# Patient Record
Sex: MALE | Race: BLACK OR AFRICAN AMERICAN | NOT HISPANIC OR LATINO | URBAN - METROPOLITAN AREA
[De-identification: names, ages, dates, MRNs, and addresses within clinical notes are randomized per-mention and may not be internally consistent; named-entity substitution may affect disease eponyms.]

---

## 2021-06-03 ENCOUNTER — INPATIENT (INPATIENT)
Facility: HOSPITAL | Age: 56
LOS: 4 days | Discharge: NURSING, REHAB OR LONG-TERM CARE | End: 2021-06-08
Attending: INTERNAL MEDICINE | Admitting: INTERNAL MEDICINE
Payer: COMMERCIAL

## 2021-06-03 VITALS
DIASTOLIC BLOOD PRESSURE: 105 MMHG | HEART RATE: 91 BPM | SYSTOLIC BLOOD PRESSURE: 201 MMHG | RESPIRATION RATE: 18 BRPM | TEMPERATURE: 99 F | HEIGHT: 68 IN | WEIGHT: 154.98 LBS | OXYGEN SATURATION: 97 %

## 2021-06-03 LAB
ALBUMIN SERPL ELPH-MCNC: 4.9 G/DL — SIGNIFICANT CHANGE UP (ref 3.5–5.2)
ALP SERPL-CCNC: 120 U/L — HIGH (ref 30–115)
ALT FLD-CCNC: 58 U/L — HIGH (ref 0–41)
ANION GAP SERPL CALC-SCNC: 17 MMOL/L — HIGH (ref 7–14)
ANISOCYTOSIS BLD QL: SLIGHT — SIGNIFICANT CHANGE UP
APTT BLD: 32.1 SEC — SIGNIFICANT CHANGE UP (ref 27–39.2)
AST SERPL-CCNC: 66 U/L — HIGH (ref 0–41)
BASOPHILS # BLD AUTO: 0.1 K/UL — SIGNIFICANT CHANGE UP (ref 0–0.2)
BASOPHILS NFR BLD AUTO: 1.9 % — HIGH (ref 0–1)
BILIRUB SERPL-MCNC: 0.2 MG/DL — SIGNIFICANT CHANGE UP (ref 0.2–1.2)
BUN SERPL-MCNC: 20 MG/DL — SIGNIFICANT CHANGE UP (ref 10–20)
CALCIUM SERPL-MCNC: 9.6 MG/DL — SIGNIFICANT CHANGE UP (ref 8.5–10.1)
CHLORIDE SERPL-SCNC: 97 MMOL/L — LOW (ref 98–110)
CO2 SERPL-SCNC: 20 MMOL/L — SIGNIFICANT CHANGE UP (ref 17–32)
CREAT SERPL-MCNC: 0.8 MG/DL — SIGNIFICANT CHANGE UP (ref 0.7–1.5)
EOSINOPHIL # BLD AUTO: 0 K/UL — SIGNIFICANT CHANGE UP (ref 0–0.7)
EOSINOPHIL NFR BLD AUTO: 0 % — SIGNIFICANT CHANGE UP (ref 0–8)
GIANT PLATELETS BLD QL SMEAR: PRESENT — SIGNIFICANT CHANGE UP
GLUCOSE SERPL-MCNC: 109 MG/DL — HIGH (ref 70–99)
HCT VFR BLD CALC: 36.5 % — LOW (ref 42–52)
HGB BLD-MCNC: 12.7 G/DL — LOW (ref 14–18)
INR BLD: 0.94 RATIO — SIGNIFICANT CHANGE UP (ref 0.65–1.3)
LYMPHOCYTES # BLD AUTO: 2.05 K/UL — SIGNIFICANT CHANGE UP (ref 1.2–3.4)
LYMPHOCYTES # BLD AUTO: 37.4 % — SIGNIFICANT CHANGE UP (ref 20.5–51.1)
MANUAL SMEAR VERIFICATION: SIGNIFICANT CHANGE UP
MCHC RBC-ENTMCNC: 29.6 PG — SIGNIFICANT CHANGE UP (ref 27–31)
MCHC RBC-ENTMCNC: 34.8 G/DL — SIGNIFICANT CHANGE UP (ref 32–37)
MCV RBC AUTO: 85.1 FL — SIGNIFICANT CHANGE UP (ref 80–94)
MONOCYTES # BLD AUTO: 0.2 K/UL — SIGNIFICANT CHANGE UP (ref 0.1–0.6)
MONOCYTES NFR BLD AUTO: 3.7 % — SIGNIFICANT CHANGE UP (ref 1.7–9.3)
NEUTROPHILS # BLD AUTO: 3.02 K/UL — SIGNIFICANT CHANGE UP (ref 1.4–6.5)
NEUTROPHILS NFR BLD AUTO: 55.1 % — SIGNIFICANT CHANGE UP (ref 42.2–75.2)
PLAT MORPH BLD: NORMAL — SIGNIFICANT CHANGE UP
PLATELET # BLD AUTO: 284 K/UL — SIGNIFICANT CHANGE UP (ref 130–400)
POIKILOCYTOSIS BLD QL AUTO: SLIGHT — SIGNIFICANT CHANGE UP
POLYCHROMASIA BLD QL SMEAR: SLIGHT — SIGNIFICANT CHANGE UP
POTASSIUM SERPL-MCNC: 4 MMOL/L — SIGNIFICANT CHANGE UP (ref 3.5–5)
POTASSIUM SERPL-SCNC: 4 MMOL/L — SIGNIFICANT CHANGE UP (ref 3.5–5)
PROT SERPL-MCNC: 7.6 G/DL — SIGNIFICANT CHANGE UP (ref 6–8)
PROTHROM AB SERPL-ACNC: 10.8 SEC — SIGNIFICANT CHANGE UP (ref 9.95–12.87)
RBC # BLD: 4.29 M/UL — LOW (ref 4.7–6.1)
RBC # FLD: 13.7 % — SIGNIFICANT CHANGE UP (ref 11.5–14.5)
RBC BLD AUTO: ABNORMAL
SARS-COV-2 RNA SPEC QL NAA+PROBE: SIGNIFICANT CHANGE UP
SMUDGE CELLS # BLD: PRESENT — SIGNIFICANT CHANGE UP
SODIUM SERPL-SCNC: 134 MMOL/L — LOW (ref 135–146)
TROPONIN T SERPL-MCNC: <0.01 NG/ML — SIGNIFICANT CHANGE UP
VARIANT LYMPHS # BLD: 1.9 % — SIGNIFICANT CHANGE UP (ref 0–5)
WBC # BLD: 5.48 K/UL — SIGNIFICANT CHANGE UP (ref 4.8–10.8)
WBC # FLD AUTO: 5.48 K/UL — SIGNIFICANT CHANGE UP (ref 4.8–10.8)

## 2021-06-03 PROCEDURE — 70551 MRI BRAIN STEM W/O DYE: CPT | Mod: 26

## 2021-06-03 PROCEDURE — 99283 EMERGENCY DEPT VISIT LOW MDM: CPT

## 2021-06-03 PROCEDURE — 70450 CT HEAD/BRAIN W/O DYE: CPT | Mod: 26,MA,59

## 2021-06-03 PROCEDURE — 70496 CT ANGIOGRAPHY HEAD: CPT | Mod: 26,MA

## 2021-06-03 PROCEDURE — 93010 ELECTROCARDIOGRAM REPORT: CPT

## 2021-06-03 PROCEDURE — 71045 X-RAY EXAM CHEST 1 VIEW: CPT | Mod: 26,77

## 2021-06-03 PROCEDURE — 99222 1ST HOSP IP/OBS MODERATE 55: CPT

## 2021-06-03 PROCEDURE — 99285 EMERGENCY DEPT VISIT HI MDM: CPT

## 2021-06-03 PROCEDURE — 71045 X-RAY EXAM CHEST 1 VIEW: CPT | Mod: 26

## 2021-06-03 PROCEDURE — 0042T: CPT

## 2021-06-03 PROCEDURE — 70498 CT ANGIOGRAPHY NECK: CPT | Mod: 26,MA

## 2021-06-03 RX ORDER — ACETAMINOPHEN 500 MG
1000 TABLET ORAL EVERY 6 HOURS
Refills: 0 | Status: COMPLETED | OUTPATIENT
Start: 2021-06-03 | End: 2021-06-03

## 2021-06-03 RX ORDER — ATORVASTATIN CALCIUM 80 MG/1
80 TABLET, FILM COATED ORAL AT BEDTIME
Refills: 0 | Status: DISCONTINUED | OUTPATIENT
Start: 2021-06-03 | End: 2021-06-08

## 2021-06-03 RX ORDER — ASPIRIN/CALCIUM CARB/MAGNESIUM 324 MG
81 TABLET ORAL DAILY
Refills: 0 | Status: DISCONTINUED | OUTPATIENT
Start: 2021-06-04 | End: 2021-06-08

## 2021-06-03 RX ORDER — ASPIRIN/CALCIUM CARB/MAGNESIUM 324 MG
300 TABLET ORAL ONCE
Refills: 0 | Status: COMPLETED | OUTPATIENT
Start: 2021-06-03 | End: 2021-06-03

## 2021-06-03 RX ORDER — ACETAMINOPHEN 500 MG
650 TABLET ORAL EVERY 6 HOURS
Refills: 0 | Status: DISCONTINUED | OUTPATIENT
Start: 2021-06-03 | End: 2021-06-03

## 2021-06-03 RX ORDER — ASPIRIN/CALCIUM CARB/MAGNESIUM 324 MG
325 TABLET ORAL ONCE
Refills: 0 | Status: COMPLETED | OUTPATIENT
Start: 2021-06-03 | End: 2021-06-03

## 2021-06-03 RX ORDER — SODIUM CHLORIDE 9 MG/ML
1000 INJECTION INTRAMUSCULAR; INTRAVENOUS; SUBCUTANEOUS ONCE
Refills: 0 | Status: COMPLETED | OUTPATIENT
Start: 2021-06-03 | End: 2021-06-03

## 2021-06-03 RX ORDER — ATORVASTATIN CALCIUM 80 MG/1
80 TABLET, FILM COATED ORAL ONCE
Refills: 0 | Status: COMPLETED | OUTPATIENT
Start: 2021-06-03 | End: 2021-06-03

## 2021-06-03 RX ADMIN — ATORVASTATIN CALCIUM 80 MILLIGRAM(S): 80 TABLET, FILM COATED ORAL at 22:28

## 2021-06-03 RX ADMIN — Medication 300 MILLIGRAM(S): at 08:52

## 2021-06-03 RX ADMIN — Medication 400 MILLIGRAM(S): at 22:28

## 2021-06-03 RX ADMIN — Medication 1000 MILLIGRAM(S): at 23:00

## 2021-06-03 RX ADMIN — SODIUM CHLORIDE 1000 MILLILITER(S): 9 INJECTION INTRAMUSCULAR; INTRAVENOUS; SUBCUTANEOUS at 09:46

## 2021-06-03 NOTE — H&P ADULT - ASSESSMENT
Impression:    Acute Non ischemic CVA- No tPA  HTN- new Dx  h/o HLD    PLAN    1.CNS : Monitor mental status, Avoid sedatives. Neuro check q1. Follow up Neurology. c/w ASA, lipitor.     2. CVS : Keep even balance. echo.     3. PULMONARY : HOB at 45. Oral care, Supplemental oxygen if needed.     4. INFECTIOUS DISEASE : monitor off abx    5. GI-  Feeding after Speech eval, NPO for now    6. RENAL and acid base : Monitor Cr and lytes.     7. Endocrine : Check a1c, lipid profile. Insulin protocol if needed     8. Hematology : DVT ppx         Dispo MICU         Impression:    Acute Non ischemic CVA- No tPA  HTN- new Dx  h/o HLD    PLAN    1.CNS : Monitor mental status, Avoid sedatives. Neuro check q1. Follow up Neurology. c/w ASA, lipitor.     2. CVS : Keep even balance. echo. SBP goal- 140-180    3. PULMONARY : HOB at 45. Oral care, Supplemental oxygen if needed.     4. INFECTIOUS DISEASE : monitor off abx    5. GI-  Feeding after Speech eval, NPO for now    6. RENAL and acid base : Monitor Cr and lytes.     7. Endocrine : Check a1c, lipid profile. Insulin protocol if needed     8. Hematology : DVT ppx         Dispo MICU         Impression:    Acute Non ischemic CVA- No tPA  HTN- new Dx  h/o HLD    PLAN    1.CNS : Monitor mental status, Avoid sedatives. Neuro check q1. Follow up Neurology. c/w ASA, lipitor. Follow up brain MRI    2. CVS : Keep even balance. echo. SBP goal- 140-180    3. PULMONARY : HOB at 45. Oral care, Supplemental oxygen if needed.     4. INFECTIOUS DISEASE : monitor off abx    5. GI-  Feeding after Speech eval, NPO for now    6. RENAL and acid base : Monitor Cr and lytes.     7. Endocrine : Check a1c, lipid profile. Insulin protocol if needed     8. Hematology : DVT ppx SCDs as per neuro         Dispo MICU

## 2021-06-03 NOTE — ED PROVIDER NOTE - NS ED ROS FT
Constitutional: (-) fever (-) chills  (-) lightheadedness   Eyes/ENT: (-) blurry vision, (-) epistaxis (-) rhinorrhea (-) nasal congestion  Cardiovascular: (-) chest pain, (-) syncope (-) palpitations   Respiratory: (-) cough, (-) shortness of breath (-) pleurisy   Gastrointestinal: (-) vomiting, (-) diarrhea (-) abdominal pain (-) nausea (-) anorexia  Musculoskeletal: (-) neck pain, (-) back pain, (-) joint pain (-) joint swelling (-) painful ROM  Integumentary: (-) rash, (-) edema (-) lacerations (-) pruritis   Neurological: (+) headache, (-) altered mental status (-) LOC (+) dizziness (+) paresthesias (-) gait abnormalities   Psychiatric: (-) hallucinations (-) SI (-) HI Constitutional: (-) fever (-) chills  (-) lightheadedness   Eyes/ENT: (-) blurry vision, (-) epistaxis (-) rhinorrhea (-) nasal congestion  Cardiovascular: (-) chest pain, (-) syncope (-) palpitations   Respiratory: (-) cough, (-) shortness of breath (-) pleurisy   Gastrointestinal: (-) vomiting, (-) diarrhea (-) abdominal pain (-) nausea (-) anorexia  Musculoskeletal: (-) neck pain, (-) back pain, (-) joint pain (-) joint swelling (-) painful ROM  Integumentary: (-) rash, (-) edema (-) lacerations (-) pruritis   Neurological: (+) headache, (-) altered mental status (-) LOC (+) dizziness (+) paresthesias (-) gait abnormalities

## 2021-06-03 NOTE — H&P ADULT - NSHPLABSRESULTS_GEN_ALL_CORE
12.7   5.48  )-----------( 284      ( 03 Jun 2021 08:20 )             36.5       06-03    134<L>  |  97<L>  |  20  ----------------------------<  109<H>  4.0   |  20  |  0.8    Ca    9.6      03 Jun 2021 08:20    TPro  7.6  /  Alb  4.9  /  TBili  0.2  /  DBili  x   /  AST  66<H>  /  ALT  58<H>  /  AlkPhos  120<H>  06-03                  PT/INR - ( 03 Jun 2021 08:20 )   PT: 10.80 sec;   INR: 0.94 ratio         PTT - ( 03 Jun 2021 08:20 )  PTT:32.1 sec    Lactate Trend      CARDIAC MARKERS ( 03 Jun 2021 08:20 )  x     / <0.01 ng/mL / x     / x     / x            CAPILLARY BLOOD GLUCOSE  109 (03 Jun 2021 09:03)      POCT Blood Glucose.: 109 mg/dL (03 Jun 2021 07:48)    < from: CT Brain Stroke Protocol (06.03.21 @ 07:58) >    IMPRESSION:    1.  No evidence of acute intracranial hemorrhage or large territory infarct.    2.  1 cm dural based lesion of the left parietal convexity likely reflecting a meningioma.        < from: CT Perfusion w/ Maps w/ IV Cont (06.03.21 @ 08:08) >    IMPRESSION:    1.  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 The PICA demonstrates diminished opacification. Corresponding ischemic penumbra within the right cerebellar hemisphere (Tmax >6s volume 15 cc, no abnormal CBF).    2.  Slight irregularity of the lateral wall of the right cavernous ICA without discrete aneurysm.    3.  Laryngeal findings suggestive of a right-sided vocal cord paralysis.    < end of copied text >

## 2021-06-03 NOTE — CONSULT NOTE ADULT - ASSESSMENT
54y/o M with RIGHT vertebral artery occlusion and RIGHT vocal cord paralysis    - Case discussed with Dr. Fonseca  - Recommending CT Neck with IV Contrast extending down to at least the aortic arch to rule out other causes of cord paralysis.   - NPO as per SLP recommendations

## 2021-06-03 NOTE — CONSULT NOTE ADULT - SUBJECTIVE AND OBJECTIVE BOX
Stroke Consult Note:    BRIGETTE CARRILLO    1. Chief Complaint: headache, unsteady gait, right sided clumsiness    HPI: 56 yo male with PMH of HTN, DLD presents with acute onset of headache, unsteady gait. Patient states onset of symptoms around 1am and LWN was midnight. Patient started to experience severe headache, which was partially alleviated by 2 Advils. Patient also experienced difficulty walking and trouble coordinating his right side and perioral numbness. He called an ambulance, stroke code was activated in ED.  NIHSS is 3 for ataxia and facial droop. Patient is outside of tpa window. CTA/CTP shows R V4 segment occlusion with corresponding penumbra.  Not an intervention candidate due low NIHSS.          2. Relevant PMH:   Prior ischemic stroke/TIA[ ], Afib [ ], CAD [ ], HTN [ ], DLD [ ], DM [ ], PVD [ ], Obesity [ ],   Sedentary lifestyle [ ], CHF [ ], GRACE [ ], Cancer Hx [ ].    3. Social History: Smoking [ ], Drug Use [ ], Alcohol Use [ ], Other [ ]    4. Possible Location of Stroke: R PICA     5. Relevant Brain Tissue Imagin. Relevant Cerebrovascular Imaging:   CT Angio Neck w/ IV Cont:   EXAM:  CT ANGIO BRAIN (W)AW IC        EXAM:  CT ANGIO NECK (W)AW IC        EXAM:  CT PERFUSION W MAPS IC            PROCEDURE DATE:  2021            INTERPRETATION:  Clinical History / Reason for exam: Stroke code. Dizziness, headache, weakness, right facial paresthesias.    Technique: CT angiogram of the head and neck including perfusion study of the brain.  Contiguous CT axial images of the head and neck were obtained following the bolus intravenous administration of 120 cc Optiray withmultiple 3-D and MIP reformats with perfusion mapping performed on a separate 3D workstation (RAPID).    Correlation made with accompanying noncontrast head CT.    Findings:    CTA neck:  The visualized aortic arch and great vessel origins are patent.    The common, internal and external carotid arteries are patent.    The extra-cranial vertebral arteries are patent.    CTA brain: The distal segments of the internal carotid arteries are patent. Slight irregularity along the lateral wall of the right cavernous ICA is noted on series 2 image 378 without discrete saccular aneurysm. The anterior and middle cerebral arteries are patent.    There is occlusion of the proximal V4 segment of the right vertebral artery after the origin of the right posterior inferior cerebral artery, which demonstrates diminished opacification.    The left vertebral artery is patent. The basilar artery is patent. The posterior cerebral arteries are patent. The basilar artery is patent. The posterior cerebral arteries are patent.    Perfusion: There is evidence for ischemia within the right cerebellar hemisphere with Tmax >6s volume of 15 cc. There is no evidence of abnormal cerebral blood flow. Mismatch volume is 15 cc.    Other:  Mild cervical spine degenerative changes with multilevel anterior bridging osteophytes and reversal of the normal cervical lordosis noted.  Asymmetric dilatation of the right laryngeal ventricle and pyriform sinus with the visualized right vocal cord. Findings can be seen in the setting of right-sided vocal cord paralysis.    IMPRESSION:    1.  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 The PICA demonstrates diminished opacification. Corresponding ischemic penumbra within the right cerebellar hemisphere (Tmax >6s volume 15 cc, no abnormal CBF).    2.  Slight irregularity of the lateral wall of the right cavernous ICA without discrete aneurysm.    3.  Laryngeal findings suggestive of a right-sided vocal cord paralysis.    *Spoke with TORI ALVAREZ on 6/3/2021 8:22 AM with readback.          LOI CALDERON MD; Attending Radiologist  This document has been electronically signed. Varun  3 2021  8:39AM (21 @ 08:09)     CT Angio Head w/ IV Cont:   EXAM:  CT ANGIO BRAIN (W)AW IC        EXAM:  CT ANGIO NECK (W)AW IC        EXAM:  CT PERFUSION W MAPS IC            PROCEDURE DATE:  2021            INTERPRETATION:  Clinical History / Reason for exam: Stroke code. Dizziness, headache, weakness, right facial paresthesias.    Technique: CT angiogram of the head and neck including perfusion study of the brain.  Contiguous CT axial images of the head and neck were obtained following the bolus intravenous administration of 120 cc Optiray withmultiple 3-D and MIP reformats with perfusion mapping performed on a separate 3D workstation (RAPID).    Correlation made with accompanying noncontrast head CT.    Findings:    CTA neck:  The visualized aortic arch and great vessel origins are patent.    The common, internal and external carotid arteries are patent.    The extra-cranial vertebral arteries are patent.    CTA brain: The distal segments of the internal carotid arteries are patent. Slight irregularity along the lateral wall of the right cavernous ICA is noted on series 2 image 378 without discrete saccular aneurysm. The anterior and middle cerebral arteries are patent.    There is occlusion of the proximal V4 segment of the right vertebral artery after the origin of the right posterior inferior cerebral artery, which demonstrates diminished opacification.    The left vertebral artery is patent. The basilar artery is patent. The posterior cerebral arteries are patent. The basilar artery is patent. The posterior cerebral arteries are patent.    Perfusion: There is evidence for ischemia within the right cerebellar hemisphere with Tmax >6s volume of 15 cc. There is no evidence of abnormal cerebral blood flow. Mismatch volume is 15 cc.    Other:  Mild cervical spine degenerative changes with multilevel anterior bridging osteophytes and reversal of the normal cervical lordosis noted.  Asymmetric dilatation of the right laryngeal ventricle and pyriform sinus with the visualized right vocal cord. Findings can be seen in the setting of right-sided vocal cord paralysis.    IMPRESSION:    1.  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 The PICA demonstrates diminished opacification. Corresponding ischemic penumbra within the right cerebellar hemisphere (Tmax >6s volume 15 cc, no abnormal CBF).    2.  Slight irregularity of the lateral wall of the right cavernous ICA without discrete aneurysm.    3.  Laryngeal findings suggestive of a right-sided vocal cord paralysis.    *Spoke with TORI ALVAREZ on 6/3/2021 8:22 AM with readback.        LOI CALDERON MD; Attending Radiologist  This document has been electronically signed. Varun  3 2021  8:39AM (21 @ 08:09)     CT Perfusion w/ Maps w/ IV Cont:   EXAM:  CT ANGIO BRAIN (W)AW IC        EXAM:  CT ANGIO NECK (W)AW IC        EXAM:  CT PERFUSION W MAPS IC            PROCEDURE DATE:  2021            INTERPRETATION:  Clinical History / Reason for exam: Stroke code. Dizziness, headache, weakness, right facial paresthesias.    Technique: CT angiogram of the head and neck including perfusion study of the brain.  Contiguous CT axial images of the head and neck were obtained following the bolus intravenous administration of 120 cc Optiray withmultiple 3-D and MIP reformats with perfusion mapping performed on a separate 3D workstation (RAPID).    Correlation made with accompanying noncontrast head CT.    Findings:    CTA neck:  The visualized aortic arch and great vessel origins are patent.    The common, internal and external carotid arteries are patent.    The extra-cranial vertebral arteries are patent.    CTA brain: The distal segments of the internal carotid arteries are patent. Slight irregularity along the lateral wall of the right cavernous ICA is noted on series 2 image 378 without discrete saccular aneurysm. The anterior and middle cerebral arteries are patent.    There is occlusion of the proximal V4 segment of the right vertebral artery after the origin of the right posterior inferior cerebral artery, which demonstrates diminished opacification.    The left vertebral artery is patent. The basilar artery is patent. The posterior cerebral arteries are patent. The basilar artery is patent. The posterior cerebral arteries are patent.    Perfusion: There is evidence for ischemia within the right cerebellar hemisphere with Tmax >6s volume of 15 cc. There is no evidence of abnormal cerebral blood flow. Mismatch volume is 15 cc.    Other:  Mild cervical spine degenerative changes with multilevel anterior bridging osteophytes and reversal of the normal cervical lordosis noted.  Asymmetric dilatation of the right laryngeal ventricle and pyriform sinus with the visualized right vocal cord. Findings can be seen in the setting of right-sided vocal cord paralysis.    IMPRESSION:    1.  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 The PICA demonstrates diminished opacification. Corresponding ischemic penumbra within the right cerebellar hemisphere (Tmax >6s volume 15 cc, no abnormal CBF).    2.  Slight irregularity of the lateral wall of the right cavernous ICA without discrete aneurysm.    3.  Laryngeal findings suggestive of a right-sided vocal cord paralysis.    *Spoke with TORI ALVAREZ on 6/3/2021 8:22 AM with readback.        LOI CALDERON MD; Attending Radiologist  This document has been electronically signed. Varun  3 2021  8:39AM (21 @ 08:08)         7. Relevant blood tests:      8. Relevant cardiac rhythm monitorin. Relevant Cardiac Structure: (TTE/RACHELLE +/-):[ ]No intracardiac thrombus/[ ] no vegetation/[ ]no [place]/EF:    Home Medications:      MEDICATIONS  (STANDING):  sodium chloride 0.9% Bolus 1000 milliLiter(s) IV Bolus once      10. PT/OT/Speech/Rehab/S&Sw/ Cognitive eval results and recommendations:    11. Exam:    Vital Signs Last 24 Hrs  T(C): 36.7 (2021 08:40), Max: 37.1 (2021 07:39)  T(F): 98 (2021 08:40), Max: 98.8 (2021 07:40)  HR: 87 (2021 08:40) (87 - 91)  BP: 187/104 (2021 08:40) (180/101 - 201/105)  BP(mean): --  RR: 17 (2021 08:40) (17 - 18)  SpO2: 98% (2021 08:40) (97% - 98%)    12.   NIH STROKE SCALE  Item	                                                        Score  1 a.	Level of Consciousness	               	0  1 b. LOC Questions	                                0  1 c.	LOC Commands	                               	0  2.	Best Gaze	                                        0  3.	Visual	                                                0  4.	Facial Palsy	                                        1  5 a.	Motor Arm - Left	                                0  5 b.	Motor Arm - Right	                        0  6 a.	Motor Leg - Left	                                0  6 b.	Motor Leg - Right	                                0  7.	Limb Ataxia	                                        2  8.	Sensory	                                                0  9.	Language	                                        0  10.	Dysarthria	                                        0  11.	Extinction and Inattention  	        0  ______________________________________  TOTAL	                                                        0    Total NIHSS on admission:      NIHSS yesterday:      NIHSS today: 3    mRS:  0 No symptoms at all  1 No significant disability despite symptoms; able to carry out all usual duties and activities without assistance  2 Slight disability; unable to carry out all previous activities, but able to look after own affairs  3 Moderate disability; requiring some help, but able to walk without assistance  4 Moderately severe disability; unable to walk without assistance and unable to attend to own bodily needs without assistance  5 Severe disability; bedridden, incontinent and requiring constant nursing care and attention  6 Dead      13. Impression: 56 yo male with PMH of HTN, DLD presents with acute onset of headache, unsteady gait and right side incoordination since 1 am. LWN is midnight. Stroke code was activated in ER. NIHSS is 3 for ataxia and facial droop. Patient is outside of tpa window. CTA/CTP shows R V4 segment occlusion with corresponding penumbra.  Not an intervention candidate due low NIHSS.      14. Probable cause/s of Stroke: thromboembolic      15. Suggestions:   Q 1 neurochecks  Start Aspirin 325 and statin 80  Keep syst 140-180  Speech and swallow eval  Echo  Lipid profile HA1C  If worsening NIHSS reactivate stroke code        16. Disposition: ICU

## 2021-06-03 NOTE — CONSULT NOTE ADULT - SUBJECTIVE AND OBJECTIVE BOX
ENT: Pt is a 54y/o M admitted to the ICU with stroke 2/2 occlusion of the proximal V4 segment of the RIGHT vertebral artery just after the PICA origin, with corresponding ischemic penumbra within the RIGHT cerebellar hemisphere. ENT consulted for CT Angio Neck findings of "asymmetric dilatation of the right laryngeal ventricle and pyriform sinus with the visualized right vocal cord suggesting RIGHT sided vocal cord paralysis, as well as SLP findings of RIGHT vocal cord paralysis seen during FEES. Pt states that he began having difficulty tolerating secretions and taking PO liquids/solids starting yesterday while in the emergency room. He still admits to difficulty swallowing, though slightly improved from yesterday. Also admits to having a much more breathy vocal quality, states his voice is much deeper/clearer. Denies difficulty breathing or any shortness of breath at this time.    HPI:  Pt is a 55 y.o. male with PMH of HLD on statin and HTN? ( non compliant with Anti HTN meds) who presents to the ED for stroke like symptoms.   Pt states he experienced since 1 am this morning-  HA, weakness, dizziness and R sided change in sensation of face and RUE. Upon EMS arrival, had difficulty ambulating. Denies taking any medications PTA. Pt admits to HA intermittently for past week, alleviated by NSAIDS. No head trauma, LOC seizure activity or ASA usage.  VS on admission stable. NIHSS on arrival 3, CODE stroke on admission.    CT Brain Stroke Protocol   No evidence of acute intracranial hemorrhage or large territory infarct. 1 cm dural based lesion of the left parietal convexity likely reflecting a meningioma.  Not a TpA candidate for low NIHSS and delayed presentation.   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 The PICA demonstrates diminished opacification. Corresponding ischemic penumbra within the right cerebellar hemisphere (Tmax >6s volume 15 cc, no abnormal CBF).    Slight irregularity of the lateral wall of the right cavernous ICA without discrete aneurysm.   Laryngeal findings suggestive of a right-sided vocal cord paralysis.  Pt seen by neurology. s/p 325 ASA, lipitor 80 and is being admitted for further management.     PAST MEDICAL & SURGICAL HISTORY:  Hyperlipidemia  Hypertension    Allergies  No Known Allergies    MEDICATIONS  (STANDING):  atorvastatin 80 milliGRAM(s) Oral at bedtime    Social History:  never smoker, social drinker (03 Jun 2021 10:56)    ROS:   ENT: all negative except as noted in HPI   CV: denies palpitations  Pulm: denies SOB, cough, hemoptysis  GI: denies change in apetite, indigestion, n/v  : denies pertinent urinary symptoms, urgency  Neuro: denies numbness/tingling, loss of sensation  Psych: denies anxiety  MS: denies muscle weakness, instability  Heme: denies easy bruising or bleeding  Endo: denies heat/cold intolerance, excessive sweating  Vascular: denies LE edema    Vital Signs Last 24 Hrs  T(C): 36.6 (03 Jun 2021 18:00), Max: 37.1 (03 Jun 2021 07:39)  T(F): 97.8 (03 Jun 2021 18:00), Max: 98.8 (03 Jun 2021 07:40)  HR: 78 (03 Jun 2021 18:30) (77 - 91)  BP: 175/86 (03 Jun 2021 18:30) (175/86 - 201/105)  BP(mean): 118 (03 Jun 2021 18:30) (118 - 126)  RR: 15 (03 Jun 2021 18:30) (15 - 18)  SpO2: 98% (03 Jun 2021 18:30) (97% - 100%)                        12.7   5.48  )-----------( 284      ( 03 Jun 2021 08:20 )             36.5     134<L>  |  97<L>  |  20  ----------------------------<  109<H>  4.0   |  20  |  0.8    Ca    9.6      03 Jun 2021 08:20  TPro  7.6  /  Alb  4.9  /  TBili  0.2  /  DBili  x   /  AST  66<H>  /  ALT  58<H>  /  AlkPhos  120<H>  06-03  PT/INR - ( 03 Jun 2021 08:20 )   PT: 10.80 sec;   INR: 0.94 ratio    PTT - ( 03 Jun 2021 08:20 )  PTT:32.1 sec    PHYSICAL EXAM:  Gen: awake, alert, NAD. No drooling or pooling of secretions. No trismus.   Skin: No rashes, bruises, or lesions  HEENT: Head NC/AT. Right facial droop noted. Nares bilaterally patent, no blood or discharge noted. Oral cavity no erythema/edema. Tongue wnl. Uvula midline. Posterior oropharynx clear, no discharge/blood noted. Neck supple, trachea midline.   Resp: breathing easily, no stridor, no accessory muscle use   CV: no peripheral edema/cyanosis  GI: soft, nontender, nondistended  Neuro: A&Ox3  Psych: normal mood, normal affect    Bedside Laryngoscopy performed, nasopharynx and posterior oropharynx wnl. Hypopharynx noted with good mobility of the LEFT vocal fold; RIGHT false and true vocal fold immobile. Epiglottis crisp. +pooling of secretions in both pyriform sinuses.    IMAGING/ADDITIONAL STUDIES:   EXAM:  CT ANGIO BRAIN (W)AW IC        EXAM:  CT ANGIO NECK (W)AW IC        EXAM:  CT PERFUSION W MAPS IC          PROCEDURE DATE:  06/03/2021    INTERPRETATION:  Clinical History / Reason for exam: Stroke code. Dizziness, headache, weakness, right facial paresthesias.  Technique: CT angiogram of the head and neck including perfusion study of the brain.  Contiguous CT axial images of the head and neck were obtained following the bolus intravenous administration of 120 cc Optiray withmultiple 3-D and MIP reformats with perfusion mapping performed on a separate 3D workstation (RAPID).  Correlation made with accompanying noncontrast head CT.    Findings:  CTA neck:  The visualized aortic arch and great vessel origins are patent.  The common, internal and external carotid arteries are patent.  The extra-cranial vertebral arteries are patent.  CTA brain: The distal segments of the internal carotid arteries are patent. Slight irregularity along the lateral wall of the right cavernous ICA is noted on series 2 image 378 without discrete saccular aneurysm. The anterior and middle cerebral arteries are patent.  There is occlusion of the proximal V4 segment of the right vertebral artery after the origin of the right posterior inferior cerebral artery, which demonstrates diminished opacification.  The left vertebral artery is patent. The basilar artery is patent. The posterior cerebral arteries are patent. The basilar artery is patent. The posterior cerebral arteries are patent.  Perfusion: There is evidence for ischemia within the right cerebellar hemisphere with Tmax >6s volume of 15 cc. There is no evidence of abnormal cerebral blood flow. Mismatch volume is 15 cc.  Other:  Mild cervical spine degenerative changes with multilevel anterior bridging osteophytes and reversal of the normal cervical lordosis noted.  Asymmetric dilatation of the right laryngeal ventricle and pyriform sinus with the visualized right vocal cord. Findings can be seen in the setting of right-sided vocal cord paralysis.    IMPRESSION:  1.  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 The PICA demonstrates diminished opacification. Corresponding ischemic penumbra within the right cerebellar hemisphere (Tmax >6s volume 15 cc, no abnormal CBF).  2.  Slight irregularity of the lateral wall of the right cavernous ICA without discrete aneurysm.  3.  Laryngeal findings suggestive of a right-sided vocal cord paralysis.  *Spoke with TORI ALVAREZ on 6/3/2021 8:22 AM with readback.    LOI CALDERON MD; Attending Radiologist  This document has been electronically signed. Varun  3 2021  8:39AM

## 2021-06-03 NOTE — ED PROVIDER NOTE - OBJECTIVE STATEMENT
55 y.o. male pmh ____ presenting for stroke like symptoms since 2 am this morning. Pt described HA, weakness, dizziness and R sided change in sensation of face and RUE. Upon EMS arrival, had difficulty ambulating. Denies taking any medications PTA. 55 y.o. male pmh ____ presenting for stroke like symptoms since 1 am this morning. Pt described HA, weakness, dizziness and R sided change in sensation of face and RUE. Upon EMS arrival, had difficulty ambulating. Denies taking any medications PTA. 55 y.o. male pmh HLD on statin and HTN not on antihypertensives presenting for stroke like symptoms since 1 am this morning. Pt described HA, weakness, dizziness and R sided change in sensation of face and RUE. Upon EMS arrival, had difficulty ambulating. Denies taking any medications PTA. Pt admits to HA intermittently for past week, alleviated by NSAIDS. No head trauma, LOC seizure activity or ASA usage.

## 2021-06-03 NOTE — ED PROVIDER NOTE - CLINICAL SUMMARY MEDICAL DECISION MAKING FREE TEXT BOX
55 year old male with a pmh of HLD & HTN presents here right sided facial numbness and difficulty with balance which he noticed at 1-2am this morning. No blurry vision neck pain cp sob n/v abdominal pain. Stroke code activated at triage.  and patient taken to CT. VS reviewed. Labs imaging ekg obtained and reviewed. CT perfussion demonstrated  *Occlusion of the proximal V4 segment of the right vertebral artery just after the PICA origin. The PICA demonstrates diminished opacification. Corresponding ischemic penumbra within the right cerebellar hemisphere (Tmax >6s volume 15 cc, no abnormal CBF).Slight irregularity of the lateral wall of the right cavernous ICA without discrete aneurysm. Laryngeal findings suggestive of a right-sided vocal cord paralysis. Was not a Acute stroke  TPA. candidate due to out of window. Spoke with Dr. Salvador who recommended admission ICU for Q1 neuro checks. Patient approved to ICU

## 2021-06-03 NOTE — CONSULT NOTE ADULT - SUBJECTIVE AND OBJECTIVE BOX
Patient is a 55y old  Male who presents with a chief complaint of CVA (03 Jun 2021 10:56)      HPI:  Pt is a 55 y.o. male with PMH of HLD on statin and HTN ( non compliant with Anti HTN meds) who presents to the ED for stroke like symptoms.   Pt states he experienced since 1 am this morning-  HA. Took 2 advil with improvement and went back to sleep. Woke up in the morning with generalized weakness, dizziness, Perioral numbness and R sided change in sensation of face and RUE. Upon EMS arrival, had difficulty ambulating. Denies taking any medications PTA. Pt admits to HA intermittently for past week, alleviated by NSAIDS. No head trauma, LOC seizure activity or ASA usage.  VS on admission stable. NIHSS on arrival 3, CODE stroke on admission.      CT Brain Stroke Protocol   No evidence of acute intracranial hemorrhage or large territory infarct. 1 cm dural based lesion of the left parietal convexity likely reflecting a meningioma.    Not a TpA candidate for low NIHSS and delayed presentation.     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 The PICA demonstrates diminished opacification. Corresponding ischemic penumbra within the right cerebellar hemisphere (Tmax >6s volume 15 cc, no abnormal CBF).      Slight irregularity of the lateral wall of the right cavernous ICA without discrete aneurysm.     Laryngeal findings suggestive of a right-sided vocal cord paralysis.    Pt seen by neurology. s/p 325 ASA, lipitor 80 and is being admitted for further management.      (03 Jun 2021 10:56)      PAST MEDICAL & SURGICAL HISTORY:  Hyperlipidemia    Hypertension        SOCIAL HX:   Smoking    denies                     ETOH          occasional                  Other denies    FAMILY HISTORY:  :  No known cardiovacular family hisotry     Review Of Systems:     All ROS are negative except per HPI       Allergies    No Known Allergies    Intolerances          PHYSICAL EXAM    ICU Vital Signs Last 24 Hrs  T(C): 36 (03 Jun 2021 12:20), Max: 37.1 (03 Jun 2021 07:39)  T(F): 96.8 (03 Jun 2021 12:20), Max: 98.8 (03 Jun 2021 07:40)  HR: 77 (03 Jun 2021 12:20) (77 - 91)  BP: 179/72 (03 Jun 2021 12:20) (179/72 - 201/105)  BP(mean): --  ABP: --  ABP(mean): --  RR: 17 (03 Jun 2021 12:20) (17 - 18)  SpO2: 100% (03 Jun 2021 12:20) (97% - 100%)      CONSTITUTIONAL:  Well nourished.   NAD    ENT:   Airway patent,   Mouth with normal mucosa.   No thrush      CARDIAC:   Normal rate,   Regular rhythm.    No edema      RESPIRATORY:   No wheezing  Bilateral BS   Not tachypneic,  No use of accessory muscles    GASTROINTESTINAL:  Abdomen soft,   Non-tender,   No guarding,       NEUROLOGICAL:   Alert and oriented   Perioral numbness  slight facial droop  No motor deficits.    SKIN:   Skin normal color for race,   No evidence of rash.      HEME LYMPH: .  No cervical  lymphadenopathy.  No inguinal lymphadenopathy              LABS:                          12.7   5.48  )-----------( 284      ( 03 Jun 2021 08:20 )             36.5                                               06-03    134<L>  |  97<L>  |  20  ----------------------------<  109<H>  4.0   |  20  |  0.8    Ca    9.6      03 Jun 2021 08:20    TPro  7.6  /  Alb  4.9  /  TBili  0.2  /  DBili  x   /  AST  66<H>  /  ALT  58<H>  /  AlkPhos  120<H>  06-03      PT/INR - ( 03 Jun 2021 08:20 )   PT: 10.80 sec;   INR: 0.94 ratio         PTT - ( 03 Jun 2021 08:20 )  PTT:32.1 sec                                           CARDIAC MARKERS ( 03 Jun 2021 08:20 )  x     / <0.01 ng/mL / x     / x     / x                                                LIVER FUNCTIONS - ( 03 Jun 2021 08:20 )  Alb: 4.9 g/dL / Pro: 7.6 g/dL / ALK PHOS: 120 U/L / ALT: 58 U/L / AST: 66 U/L / GGT: x                                                                                                                                       X-Rays reviewed                                                                                     ECHO    CXR interpreted by me     MEDICATIONS  (STANDING):  atorvastatin 80 milliGRAM(s) Oral at bedtime    MEDICATIONS  (PRN):         Patient is a 55y old  Male who presents with a chief complaint of CVA (03 Jun 2021 10:56)      HPI:  Pt is a 55 y.o. male with PMH of HLD on statin and HTN ( non compliant with Anti HTN meds) who presents to the ED for stroke like symptoms.   Pt states he experienced since 1 am this morning-  HA. Took 2 advil with improvement and went back to sleep. Woke up in the morning with generalized weakness, dizziness, Perioral numbness and R sided change in sensation of face and RUE. Upon EMS arrival, had difficulty ambulating. Denies taking any medications PTA. Pt admits to HA intermittently for past week, alleviated by NSAIDS. No head trauma, LOC seizure activity or ASA usage.  VS on admission stable. NIHSS on arrival 3, CODE stroke on admission.      CT Brain Stroke Protocol   No evidence of acute intracranial hemorrhage or large territory infarct. 1 cm dural based lesion of the left parietal convexity likely reflecting a meningioma.    Not a TpA candidate for low NIHSS and delayed presentation.     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 The PICA demonstrates diminished opacification. Corresponding ischemic penumbra within the right cerebellar hemisphere (Tmax >6s volume 15 cc, no abnormal CBF).      Slight irregularity of the lateral wall of the right cavernous ICA without discrete aneurysm.     Laryngeal findings suggestive of a right-sided vocal cord paralysis.    Pt seen by neurology. s/p 325 ASA, lipitor 80 and is being admitted for further management.      (03 Jun 2021 10:56)      PAST MEDICAL & SURGICAL HISTORY:  Hyperlipidemia    Hypertension        SOCIAL HX:   Smoking    denies                     ETOH          occasional                  Other denies    FAMILY HISTORY:  :  No known cardiovacular family hisotry     Review Of Systems:     All ROS are negative except per HPI       Allergies    No Known Allergies    Intolerances          PHYSICAL EXAM    ICU Vital Signs Last 24 Hrs  T(C): 36 (03 Jun 2021 12:20), Max: 37.1 (03 Jun 2021 07:39)  T(F): 96.8 (03 Jun 2021 12:20), Max: 98.8 (03 Jun 2021 07:40)  HR: 77 (03 Jun 2021 12:20) (77 - 91)  BP: 179/72 (03 Jun 2021 12:20) (179/72 - 201/105)    RR: 17 (03 Jun 2021 12:20) (17 - 18)  SpO2: 100% (03 Jun 2021 12:20) (97% - 100%)      CONSTITUTIONAL:  Well nourished.   NAD    ENT:   Airway patent,   Mouth with normal mucosa.   No thrush      CARDIAC:   Normal rate,   Regular rhythm.    No edema      RESPIRATORY:   No wheezing  Bilateral BS   Not tachypneic,  No use of accessory muscles    GASTROINTESTINAL:  Abdomen soft,   Non-tender,   No guarding,       NEUROLOGICAL:   Alert and oriented   Perioral numbness  slight facial droop  No motor deficits.    SKIN:   Skin normal color for race,   No evidence of rash.                  LABS:                          12.7   5.48  )-----------( 284      ( 03 Jun 2021 08:20 )             36.5                                               06-03    134<L>  |  97<L>  |  20  ----------------------------<  109<H>  4.0   |  20  |  0.8    Ca    9.6      03 Jun 2021 08:20    TPro  7.6  /  Alb  4.9  /  TBili  0.2  /  DBili  x   /  AST  66<H>  /  ALT  58<H>  /  AlkPhos  120<H>  06-03      PT/INR - ( 03 Jun 2021 08:20 )   PT: 10.80 sec;   INR: 0.94 ratio         PTT - ( 03 Jun 2021 08:20 )  PTT:32.1 sec                                           CARDIAC MARKERS ( 03 Jun 2021 08:20 )  x     / <0.01 ng/mL / x     / x     / x                                                LIVER FUNCTIONS - ( 03 Jun 2021 08:20 )  Alb: 4.9 g/dL / Pro: 7.6 g/dL / ALK PHOS: 120 U/L / ALT: 58 U/L / AST: 66 U/L / GGT: x                                                                                                                                      MEDICATIONS  (STANDING):  atorvastatin 80 milliGRAM(s) Oral at bedtime    MEDICATIONS  (PRN):

## 2021-06-03 NOTE — ED PROVIDER NOTE - PRIOR EKG STATUS
MANTOUX TUBERCULIN (a.k.a. TB) SKIN TEST      What is Tuberculosis/TB?  Tuberculosis/TB is an infectious disease, which is spread through the air by tiny germs when people cough, sneeze, speak or sing. TB germs are very small and can remain in the air for a long period of time. TB germs most oft  en settle in your lungs, but may also settle in other organs of your body. TB germs can be present in your body without making you ill. This is called TB INFECTION. TB infection cannot be spread to other people. When your  body’s defenses become weak and can no longer control the TB germs they multiply, this is called TB DISEASE. It can take month(s) to year(s) for TB infection to become TB disease. The TB SKIN TEST can show if a person has  been “infected” by TB germs.    How is the Mantoux Tuberculin/TB skin test given?  A very small amount of a product called Purified Protein Derivative (PPD) is injected just under the top layer of the skin on the forearm. Persons who have been infected by the TB germs usually react to the test by developing swelling at the injection site. The skin test results must be read 48 to 72 hours after given. Failure to return within this time frame means the test will need to be repeated.    Side effects…  Side effects from a skin test are rare and may include itching and discomfort at the injection site and very rarely the possibility of a blister, ulceration or necrosis (dead tissue) at the site if the injection.    Patient had PPD placed today, Please return in 48-72 hours  This means on 10/4/19 after 5:45 pm   OR on 10/5/19 before 4 pm we close right at 4 pm  Laura Calderon presents today for a screening Mantoux Tuberculin Skin Test.    See Immunization activity for NDC & LOT information.    Patient instructed to return in 48 to 72 hours for reading.    Signed: Ronald Matthews, JAYDE       there is no prior EKG available for comparison

## 2021-06-03 NOTE — ED PROVIDER NOTE - ATTENDING CONTRIBUTION TO CARE
I personally evaluated the patient. I reviewed the Resident’s or Physician Assistant’s note (as assigned above), and agree with the findings and plan except as documented in my note.  55 year old male with a pmh of possibly htn presents here right sided facial numbness and difficulty with balance which he noticed at 1-2am this morning. No blurry vision neck pain cp sob n/v abdominal pain. Stroke code activated at triage.  and patient taken to CT.  On exam  CONSTITUTIONAL: WA / WN / NAD  HEAD: NCAT  EYES: PERRL; EOMI;   ENT: Normal pharynx; mucous membranes pink/moist, no erythema.  NECK: Supple; no meningeal signs  CARD: RRR; nl S1/S2; no M/R/G.   RESP: Respiratory rate and effort are normal; breath sounds clear and equal bilaterally.  MSK/EXT: No gross deformities; full range of motion.  SKIN: Warm and dry;   NEURO: AAOx3 mild right facial droop. Normal speech no dysarthria. + dysmetria on the right, none on the left. No drift.  PSYCH: Memory Intact, Normal Affect I personally evaluated the patient. I reviewed the Resident’s or Physician Assistant’s note (as assigned above), and agree with the findings and plan except as documented in my note.  55 year old male with a pmh of HLD & HTN presents here right sided facial numbness and difficulty with balance which he noticed at 1-2am this morning. No blurry vision neck pain cp sob n/v abdominal pain. Stroke code activated at triage.  and patient taken to CT.  On exam  CONSTITUTIONAL: WA / WN / NAD  HEAD: NCAT  EYES: PERRL; EOMI;   ENT: Normal pharynx; mucous membranes pink/moist, no erythema.  NECK: Supple; no meningeal signs  CARD: RRR; nl S1/S2; no M/R/G.   RESP: Respiratory rate and effort are normal; breath sounds clear and equal bilaterally.  MSK/EXT: No gross deformities; full range of motion.  SKIN: Warm and dry;   NEURO: AAOx3 mild right facial droop. Normal speech no dysarthria. + dysmetria on the right, none on the left. No drift.  PSYCH: Memory Intact, Normal Affect

## 2021-06-03 NOTE — H&P ADULT - HISTORY OF PRESENT ILLNESS
Pt is a 55 y.o. male with PMH of HLD on statin and HTN? who presents to the ED for stroke like symptoms.   Pt states he experienced since 1 am this morning-  HA, weakness, dizziness and R sided change in sensation of face and RUE. Upon EMS arrival, had difficulty ambulating. Denies taking any medications PTA. Pt admits to HA intermittently for past week, alleviated by NSAIDS. No head trauma, LOC seizure activity or ASA usage.  VS on admission stable. NIHSS on arrival 3, CODE stroke on admission.      CT Brain Stroke Protocol   No evidence of acute intracranial hemorrhage or large territory infarct. 1 cm dural based lesion of the left parietal convexity likely reflecting a meningioma.    Not a TpA candidate for low NIHSS and delayed presentation.     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 The PICA demonstrates diminished opacification. Corresponding ischemic penumbra within the right cerebellar hemisphere (Tmax >6s volume 15 cc, no abnormal CBF).      Slight irregularity of the lateral wall of the right cavernous ICA without discrete aneurysm.     Laryngeal findings suggestive of a right-sided vocal cord paralysis.    Pt seen by neurology. s/p 325 ASA, lipitor 80 and is being admitted for further management.      Pt is a 55 y.o. male with PMH of HLD on statin and HTN? ( non compliant with Anti HTN meds) who presents to the ED for stroke like symptoms.   Pt states he experienced since 1 am this morning-  HA, weakness, dizziness and R sided change in sensation of face and RUE. Upon EMS arrival, had difficulty ambulating. Denies taking any medications PTA. Pt admits to HA intermittently for past week, alleviated by NSAIDS. No head trauma, LOC seizure activity or ASA usage.  VS on admission stable. NIHSS on arrival 3, CODE stroke on admission.      CT Brain Stroke Protocol   No evidence of acute intracranial hemorrhage or large territory infarct. 1 cm dural based lesion of the left parietal convexity likely reflecting a meningioma.    Not a TpA candidate for low NIHSS and delayed presentation.     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 The PICA demonstrates diminished opacification. Corresponding ischemic penumbra within the right cerebellar hemisphere (Tmax >6s volume 15 cc, no abnormal CBF).      Slight irregularity of the lateral wall of the right cavernous ICA without discrete aneurysm.     Laryngeal findings suggestive of a right-sided vocal cord paralysis.    Pt seen by neurology. s/p 325 ASA, lipitor 80 and is being admitted for further management.

## 2021-06-03 NOTE — ED PROVIDER NOTE - PROGRESS NOTE DETAILS
DC: Stroke code called, VSS, NIH score 2. R Sided facial droop and and R sided dysmetria. . Iv placed and sent to CT scan. DC: Stroke code called, VSS, NIH score 2. R Sided facial droop and R sided dysmetria. . Iv placed and sent to CT scan. SR: spoke with dr. hobson from telestroke, not a tpa candidate out of the window since symptoms began at 1am. DC: Stroke code called, VSS, NIH score 2. + minor right sided facial palsy and R sided dysmetria FNF. . Iv placed and sent to CT scan. TC: CT head noncon negative. CTA perfusion shows small defect R cerebellum. Neuro Dr. Salvador and KATIE Carmen at bedside. Recommends ASA, lipitor. DC: DR Salvador recommending Q1hr neuro checks and Neuro ICU admission. DC: ICU approved. Admitting under Dr. Martin. Pt failed swallow trial. Protecting airway, handling oral secretions speaking in full sentences A+Ox3. DC: Neuro reassament: NIH remains 2. R sided dysmetria and R sided minor facial droop. Handling oral secretions and speaking in full sentences. DC: Pt describing increased R sided facial paresthesias. Nystagmus noted on exam. Neuro notified and will evaluate pt. ICU attempted to be contacted at  [phone], unable to reach.

## 2021-06-03 NOTE — CONSULT NOTE ADULT - ATTENDING COMMENTS
Patient seen for acute right cerebellar stroke.  Has ataxia on right side.  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  Not TPA candidate due to being outside temporal window.  I communicated with neuroendovascular and confirmed there would be no intervention warranted for patients current symptoms.  Q1h neuro checks was recommended overnight and neurocritical care team was updated.
I have seen and examined the patient at bedside.  I reviewed and interpreted CT images.  I reviewed and interpreted video-endoscopy showing unilateral vocal cord paralysis.    Recommend diet recommendation as per SLP.    Possible need for injection down the road.
events noted, headache/ ataxia/ facial droop, stroke code sp CT ( occlusion of proximal V4 seg of the right vertebral artery), no tpa, Neuro reviewed, admit to MICU, Neuro check q 1h, ASA/ statin/ -180

## 2021-06-03 NOTE — ED PROVIDER NOTE - PHYSICAL EXAMINATION
Physical Exam    Vital Signs: I have reviewed the initial vital signs.  Constitutional: well-nourished, appears stated age, no acute distress  Eyes: Conjunctiva pink, Sclera clear, PERRLA, EOMI, no ptosis, no entrapment, no racoon eyes.  Cardiovascular: S1 and S2, regular rate, regular rhythm, well-perfused extremities, radial pulses equal and 2+, calves nonttp, equal in size  Respiratory: unlabored respiratory effort, speaking in full sentences, handling oral secretions, clear to auscultation bilaterally no wheezing, rales and rhonchi  Gastrointestinal: soft, non-tender abdomen, no pulsatile mass, normal bowl sounds  Musculoskeletal: supple neck, no gross bony deformities or swelling.  Integumentary: warm, dry, no rashes, lacerations,  Neurologic: awake, alert, cranial nerves II-XII grossly intact, extremities’ motor and sensory functions grossly intact, no nystagmus, tremors, fasiculationsm, + R sided facial droop, + Right sided dysmetria  Psychiatric: appropriate mood, appropriate affect Physical Exam    Vital Signs: I have reviewed the initial vital signs.  Constitutional: well-nourished, appears stated age, no acute distress  Eyes: Conjunctiva pink, Sclera clear, PERRLA, EOMI, no ptosis, no entrapment, no raccoon eyes.  Cardiovascular: S1 and S2, regular rate, regular rhythm, well-perfused extremities, radial pulses equal and 2+, calves non ttp, equal in size  Respiratory: unlabored respiratory effort, speaking in full sentences, handling oral secretions, clear to auscultation bilaterally no wheezing, rales and rhonchi  Gastrointestinal: soft, non-tender abdomen, no pulsatile mass, normal bowl sounds  Musculoskeletal: supple neck, no gross bony deformities or swelling.  Integumentary: warm, dry, no rashes, lacerations,  Neurologic: awake, alert, cranial nerves II-XII grossly intact, extremities’ motor and sensory functions grossly intact, no nystagmus, tremors, fasiculationsm, + R sided facial droop, + Right sided dysmetria  Psychiatric: appropriate mood, appropriate affect Physical Exam    Vital Signs: I have reviewed the initial vital signs.  Constitutional: well-nourished, appears stated age, no acute distress  Eyes: Conjunctiva pink, Sclera clear, PERRLA, EOMI, no ptosis, no entrapment, no raccoon eyes.  Cardiovascular: S1 and S2, regular rate, regular rhythm, well-perfused extremities, radial pulses equal and 2+, calves non ttp, equal in size  Respiratory: unlabored respiratory effort, speaking in full sentences, handling oral secretions, clear to auscultation bilaterally no wheezing, rales and rhonchi  Gastrointestinal: soft, non-tender abdomen  Musculoskeletal: supple neck, no gross bony deformities or swelling.  Integumentary: warm, dry, no rashes, lacerations,  Neurologic: awake, alert, cranial nerves II-XII grossly intact, extremities’ motor and sensory functions grossly intact, no nystagmus, tremors, fasiculations, + R sided facial droop, + Right sided dysmetria

## 2021-06-03 NOTE — CONSULT NOTE ADULT - ASSESSMENT
IMPRESSION:      PLAN:    CNS: Avoid sedatives, Q1H neuro checks, Neuro FU. ASA + statin.     HEENT: Oral care    PULMONARY:  HOB @ 45 degrees.  Aspiration precautions.     CARDIOVASCULAR: Allow permissive HTN, Goal SBP <180.     GI: GI prophylaxis.  Feeding per S&S (complaining of dysphagia). If needed then NG tube.   Bowel regimen     RENAL:  Follow up lytes.  Correct as needed    INFECTIOUS DISEASE: Follow up cultures.     HEMATOLOGICAL:  DVT prophylaxis.    ENDOCRINE:  Follow up FS.  Insulin protocol if needed.    MUSCULOSKELETAL: bedrest, PT/OT.    Dispo: MICU         IMPRESSION:  Acute CVA  Ho HTN ?compliance with meds  HO HLD    PLAN:    CNS: Avoid sedatives, Q1H neuro checks, Neuro FU. ASA + statin.     HEENT: Oral care    PULMONARY:  HOB @ 45 degrees.  Aspiration precautions.     CARDIOVASCULAR: Allow permissive HTN, Goal SBP <180.     GI: GI prophylaxis.  Feeding per S&S (complaining of dysphagia). If needed then NG tube.   Bowel regimen     RENAL:  Follow up lytes.  Correct as needed    INFECTIOUS DISEASE: Follow up cultures.     HEMATOLOGICAL:  DVT prophylaxis.    ENDOCRINE:  Follow up FS.  Insulin protocol if needed.    MUSCULOSKELETAL: bedrest, PT/OT.    Dispo: MICU         IMPRESSION:    Acute CVA/ no tpa   Ho HTN ?compliance with meds  HO HLD    PLAN:    CNS: Avoid sedatives, Q1H neuro checks, Neuro FU. ASA + statin.     HEENT: Oral care    PULMONARY:  HOB @ 45 degrees.  Aspiration precautions.     CARDIOVASCULAR: Allow permissive HTN, Goal SBP <180.     GI: GI prophylaxis.  Feeding per S&S (complaining of dysphagia). If needed then NG tube.   Bowel regimen     RENAL:  Follow up lytes.  Correct as needed    INFECTIOUS DISEASE: Follow up cultures.     HEMATOLOGICAL:  DVT prophylaxis.    ENDOCRINE:  Follow up FS.  Insulin protocol if needed.    MUSCULOSKELETAL: bedrest, PT/OT.    Dispo: MICU

## 2021-06-03 NOTE — ED PROVIDER NOTE - CARE PLAN
Principal Discharge DX:	Stroke   Principal Discharge DX:	Stroke  Secondary Diagnosis:	Dysmetria  Secondary Diagnosis:	Difficulty swallowing

## 2021-06-03 NOTE — ED ADULT TRIAGE NOTE - CHIEF COMPLAINT QUOTE
BIBA from home, since 1-2AM, right sided facial numbness, loss of balance coordination, severe headache. right sided facial droop noted in triage. no arm or leg drift, no slurred speech, no change in vision. strength equal bilaterally, stroke code called

## 2021-06-03 NOTE — H&P ADULT - NSHPPHYSICALEXAM_GEN_ALL_CORE
PHYSICAL EXAM:  GENERAL: NAD, well-developed  HEAD:  Atraumatic, Normocephalic  EYES: EOMI, PERRLA, conjunctiva and sclera clear  NECK: Supple, No JVD  CHEST/LUNG: Clear to auscultation bilaterally; No wheeze  HEART: Regular rate and rhythm; No murmurs, rubs, or gallops  ABDOMEN: Soft, Nontender, Nondistended; Bowel sounds present  EXTREMITIES:  2+ Peripheral Pulses, No clubbing, cyanosis, or edema  PSYCH: AAOx3  NEUROLOGY: RUE weakness  SKIN: No rashes or lesions

## 2021-06-04 LAB
A1C WITH ESTIMATED AVERAGE GLUCOSE RESULT: 5.8 % — HIGH (ref 4–5.6)
ALBUMIN SERPL ELPH-MCNC: 4.7 G/DL — SIGNIFICANT CHANGE UP (ref 3.5–5.2)
ALP SERPL-CCNC: 93 U/L — SIGNIFICANT CHANGE UP (ref 30–115)
ALT FLD-CCNC: 44 U/L — HIGH (ref 0–41)
ANION GAP SERPL CALC-SCNC: 14 MMOL/L — SIGNIFICANT CHANGE UP (ref 7–14)
AST SERPL-CCNC: 47 U/L — HIGH (ref 0–41)
BASOPHILS # BLD AUTO: 0.07 K/UL — SIGNIFICANT CHANGE UP (ref 0–0.2)
BASOPHILS NFR BLD AUTO: 0.7 % — SIGNIFICANT CHANGE UP (ref 0–1)
BILIRUB SERPL-MCNC: 0.4 MG/DL — SIGNIFICANT CHANGE UP (ref 0.2–1.2)
BUN SERPL-MCNC: 12 MG/DL — SIGNIFICANT CHANGE UP (ref 10–20)
CALCIUM SERPL-MCNC: 9.7 MG/DL — SIGNIFICANT CHANGE UP (ref 8.5–10.1)
CHLORIDE SERPL-SCNC: 98 MMOL/L — SIGNIFICANT CHANGE UP (ref 98–110)
CHOLEST SERPL-MCNC: 207 MG/DL — HIGH
CO2 SERPL-SCNC: 23 MMOL/L — SIGNIFICANT CHANGE UP (ref 17–32)
COVID-19 SPIKE DOMAIN AB INTERP: POSITIVE
COVID-19 SPIKE DOMAIN ANTIBODY RESULT: >250 U/ML — HIGH
CREAT SERPL-MCNC: 0.9 MG/DL — SIGNIFICANT CHANGE UP (ref 0.7–1.5)
EOSINOPHIL # BLD AUTO: 0.01 K/UL — SIGNIFICANT CHANGE UP (ref 0–0.7)
EOSINOPHIL NFR BLD AUTO: 0.1 % — SIGNIFICANT CHANGE UP (ref 0–8)
ESTIMATED AVERAGE GLUCOSE: 120 MG/DL — HIGH (ref 68–114)
GLUCOSE SERPL-MCNC: 97 MG/DL — SIGNIFICANT CHANGE UP (ref 70–99)
HCT VFR BLD CALC: 36.7 % — LOW (ref 42–52)
HCV AB S/CO SERPL IA: 42.21 COI — HIGH
HCV AB SERPL-IMP: REACTIVE
HDLC SERPL-MCNC: 89 MG/DL — SIGNIFICANT CHANGE UP
HGB BLD-MCNC: 12.8 G/DL — LOW (ref 14–18)
IMM GRANULOCYTES NFR BLD AUTO: 0.4 % — HIGH (ref 0.1–0.3)
LIPID PNL WITH DIRECT LDL SERPL: 91 MG/DL — SIGNIFICANT CHANGE UP
LYMPHOCYTES # BLD AUTO: 1.81 K/UL — SIGNIFICANT CHANGE UP (ref 1.2–3.4)
LYMPHOCYTES # BLD AUTO: 18.8 % — LOW (ref 20.5–51.1)
MAGNESIUM SERPL-MCNC: 2.1 MG/DL — SIGNIFICANT CHANGE UP (ref 1.8–2.4)
MCHC RBC-ENTMCNC: 29.3 PG — SIGNIFICANT CHANGE UP (ref 27–31)
MCHC RBC-ENTMCNC: 34.9 G/DL — SIGNIFICANT CHANGE UP (ref 32–37)
MCV RBC AUTO: 84 FL — SIGNIFICANT CHANGE UP (ref 80–94)
MONOCYTES # BLD AUTO: 0.59 K/UL — SIGNIFICANT CHANGE UP (ref 0.1–0.6)
MONOCYTES NFR BLD AUTO: 6.1 % — SIGNIFICANT CHANGE UP (ref 1.7–9.3)
NEUTROPHILS # BLD AUTO: 7.13 K/UL — HIGH (ref 1.4–6.5)
NEUTROPHILS NFR BLD AUTO: 73.9 % — SIGNIFICANT CHANGE UP (ref 42.2–75.2)
NON HDL CHOLESTEROL: 118 MG/DL — SIGNIFICANT CHANGE UP
NRBC # BLD: 0 /100 WBCS — SIGNIFICANT CHANGE UP (ref 0–0)
PLATELET # BLD AUTO: 246 K/UL — SIGNIFICANT CHANGE UP (ref 130–400)
POTASSIUM SERPL-MCNC: 4 MMOL/L — SIGNIFICANT CHANGE UP (ref 3.5–5)
POTASSIUM SERPL-SCNC: 4 MMOL/L — SIGNIFICANT CHANGE UP (ref 3.5–5)
PROT SERPL-MCNC: 7.5 G/DL — SIGNIFICANT CHANGE UP (ref 6–8)
RBC # BLD: 4.37 M/UL — LOW (ref 4.7–6.1)
RBC # FLD: 13.6 % — SIGNIFICANT CHANGE UP (ref 11.5–14.5)
SARS-COV-2 IGG+IGM SERPL QL IA: >250 U/ML — HIGH
SARS-COV-2 IGG+IGM SERPL QL IA: POSITIVE
SODIUM SERPL-SCNC: 135 MMOL/L — SIGNIFICANT CHANGE UP (ref 135–146)
TRIGL SERPL-MCNC: 143 MG/DL — SIGNIFICANT CHANGE UP
WBC # BLD: 9.65 K/UL — SIGNIFICANT CHANGE UP (ref 4.8–10.8)
WBC # FLD AUTO: 9.65 K/UL — SIGNIFICANT CHANGE UP (ref 4.8–10.8)

## 2021-06-04 PROCEDURE — 99223 1ST HOSP IP/OBS HIGH 75: CPT | Mod: 25

## 2021-06-04 PROCEDURE — 31575 DIAGNOSTIC LARYNGOSCOPY: CPT

## 2021-06-04 PROCEDURE — 70491 CT SOFT TISSUE NECK W/DYE: CPT | Mod: 26

## 2021-06-04 PROCEDURE — 99232 SBSQ HOSP IP/OBS MODERATE 35: CPT

## 2021-06-04 PROCEDURE — 93306 TTE W/DOPPLER COMPLETE: CPT | Mod: 26

## 2021-06-04 RX ORDER — ACETAMINOPHEN 500 MG
650 TABLET ORAL EVERY 6 HOURS
Refills: 0 | Status: DISCONTINUED | OUTPATIENT
Start: 2021-06-04 | End: 2021-06-08

## 2021-06-04 RX ORDER — ENOXAPARIN SODIUM 100 MG/ML
40 INJECTION SUBCUTANEOUS AT BEDTIME
Refills: 0 | Status: DISCONTINUED | OUTPATIENT
Start: 2021-06-04 | End: 2021-06-08

## 2021-06-04 RX ORDER — PANTOPRAZOLE SODIUM 20 MG/1
40 TABLET, DELAYED RELEASE ORAL
Refills: 0 | Status: DISCONTINUED | OUTPATIENT
Start: 2021-06-04 | End: 2021-06-04

## 2021-06-04 RX ORDER — KETOROLAC TROMETHAMINE 30 MG/ML
15 SYRINGE (ML) INJECTION ONCE
Refills: 0 | Status: DISCONTINUED | OUTPATIENT
Start: 2021-06-04 | End: 2021-06-04

## 2021-06-04 RX ORDER — PANTOPRAZOLE SODIUM 20 MG/1
40 TABLET, DELAYED RELEASE ORAL
Refills: 0 | Status: DISCONTINUED | OUTPATIENT
Start: 2021-06-04 | End: 2021-06-08

## 2021-06-04 RX ORDER — CHLORHEXIDINE GLUCONATE 213 G/1000ML
1 SOLUTION TOPICAL
Refills: 0 | Status: DISCONTINUED | OUTPATIENT
Start: 2021-06-04 | End: 2021-06-08

## 2021-06-04 RX ADMIN — Medication 650 MILLIGRAM(S): at 10:13

## 2021-06-04 RX ADMIN — Medication 15 MILLIGRAM(S): at 15:05

## 2021-06-04 RX ADMIN — Medication 15 MILLIGRAM(S): at 14:50

## 2021-06-04 RX ADMIN — PANTOPRAZOLE SODIUM 40 MILLIGRAM(S): 20 TABLET, DELAYED RELEASE ORAL at 06:06

## 2021-06-04 RX ADMIN — Medication 650 MILLIGRAM(S): at 09:43

## 2021-06-04 RX ADMIN — Medication 81 MILLIGRAM(S): at 11:05

## 2021-06-04 RX ADMIN — CHLORHEXIDINE GLUCONATE 1 APPLICATION(S): 213 SOLUTION TOPICAL at 06:07

## 2021-06-04 NOTE — PROGRESS NOTE ADULT - SUBJECTIVE AND OBJECTIVE BOX
SUBJECTIVE:    Patient is a 55y old Male who presents with a chief complaint of CVA (03 Jun 2021 19:03)    Currently admitted to medicine with the primary diagnosis of Stroke       Today is hospital day 1d. This morning he is resting comfortably in bed and reports no new issues or overnight events.     PAST MEDICAL & SURGICAL HISTORY  Hyperlipidemia    Hypertension      SOCIAL HISTORY:  Negative for smoking/alcohol/drug use.     ALLERGIES:  No Known Allergies    MEDICATIONS:  STANDING MEDICATIONS  aspirin  chewable 81 milliGRAM(s) Oral daily  atorvastatin 80 milliGRAM(s) Oral at bedtime  chlorhexidine 4% Liquid 1 Application(s) Topical <User Schedule>  pantoprazole   Suspension 40 milliGRAM(s) Oral before breakfast    PRN MEDICATIONS    VITALS:   T(F): 97.1  HR: 70  BP: 173/97  RR: 14  SpO2: 98%    LABS:                        12.8   9.65  )-----------( 246      ( 04 Jun 2021 04:25 )             36.7     06-04    135  |  98  |  12  ----------------------------<  97  4.0   |  23  |  0.9    Ca    9.7      04 Jun 2021 04:25  Mg     2.1     06-04    TPro  7.5  /  Alb  4.7  /  TBili  0.4  /  DBili  x   /  AST  47<H>  /  ALT  44<H>  /  AlkPhos  93  06-04    PT/INR - ( 03 Jun 2021 08:20 )   PT: 10.80 sec;   INR: 0.94 ratio         PTT - ( 03 Jun 2021 08:20 )  PTT:32.1 sec          CARDIAC MARKERS ( 03 Jun 2021 08:20 )  x     / <0.01 ng/mL / x     / x     / x          RADIOLOGY:    PHYSICAL EXAM:  GEN: No acute distress  LUNGS: Clear to auscultation bilaterally   HEART: S1/S2 present. RRR.   ABD: Soft, non-tender, non-distended. Bowel sounds present  EXT: NC/NC/NE/2+PP/VALE/Skin Intact.   NEURO: AAOX3    Intravenous access:   NG tube:   Cazares Catheter:

## 2021-06-04 NOTE — SWALLOW FEES ASSESSMENT ADULT - PRELIMINARY ENDOSCOPIC EXAMINATIONS
R false and TVC immobile per ENT, again demonstrated on FEES./Baseline pooling of secretions/Baseline aspiration of secretions/Baseline penetration of secretions

## 2021-06-04 NOTE — SWALLOW FEES ASSESSMENT ADULT - UNSUCCESSFUL STRATEGIES TRIALED DURING PROCEDURE
chin tuck/hard swallow/head turn to the right/nonproductive volitional cough following clinician cue

## 2021-06-04 NOTE — PROGRESS NOTE ADULT - ASSESSMENT
IMPRESSION:    Acute CVA/ no tpa   VC paralysis/ sp ENT  Ho HTN ?compliance with meds  HO HLD    PLAN:    CNS: Avoid sedatives, Q1H neuro checks, Neuro FU. ASA + statin.     HEENT: Oral care    PULMONARY:  HOB @ 45 degrees.  Aspiration precautions.     CARDIOVASCULAR: Allow permissive HTN, Goal SBP <140 -180.     GI: GI prophylaxis.  Feeding per S&S (complaining of dysphagia). If needed then NG tube.   Bowel regimen     RENAL:  Follow up lytes.  Correct as needed    INFECTIOUS DISEASE: Follow up cultures.     HEMATOLOGICAL:  DVT prophylaxis.    ENDOCRINE:  Follow up FS.  Insulin protocol if needed.    MUSCULOSKELETAL: bedrest, PT/OT.    Dispo: stroke unit/ DU in cleared by Neuro

## 2021-06-04 NOTE — SWALLOW FEES ASSESSMENT ADULT - SLP PERTINENT HISTORY OF CURRENT PROBLEM
Pt is a 54 y/o M w/ PMHx: HLD and HTN? (non-compliant w/ anti-HTN meds) who presents to the ED for stroke-like symptoms. NIHSS 3 on arrival, +stroke code. Not a TpA candidate for low NIHSS and delayed presentation. CT Brain (-) for acute infarct; 1cm dural based lesion of the left parietal convexity likely reflecting a meningioma. CT perfusion showed occlusion of the proximal V4 segment of the right vertebral artery just after the PICA origin. Corresponding ischemic penumbra within the right cerebellar hemisphere. Laryngeal findings suggestive of a right-sided vocal cord paralysis. pt seen by ENT, bedside laryngoscopy performed-> R false and TVC immobile. MRI (-); CT neck-> no evidence of mass lesion, other compressive lesion or lymphadenopathy. Ill-defined asymmetric soft tissue enhancement involving the right perimandibular/gingival soft tissues.

## 2021-06-04 NOTE — SWALLOW FEES ASSESSMENT ADULT - ASPIRATION AFTER SWALLOW - COUGH
intermittent throat clear ineffective in clearing aspirated material/Moderate intermittent reflexive throat clear ineffective in clearing aspirated material/Moderate

## 2021-06-04 NOTE — CHART NOTE - NSCHARTNOTEFT_GEN_A_CORE
Reason for Admission: CVA  History of Present Illness:   Pt is a 55 y.o. male with PMH of HLD on statin and HTN? ( non compliant with Anti HTN meds) who presents to the ED for stroke like symptoms.   Pt states he experienced since 1 am this morning-  HA, weakness, dizziness and R sided change in sensation of face and RUE. Upon EMS arrival, had difficulty ambulating. Denies taking any medications PTA. Pt admits to HA intermittently for past week, alleviated by NSAIDS. No head trauma, LOC seizure activity or ASA usage.  VS on admission stable. NIHSS on arrival 3, CODE stroke on admission.      CT Brain Stroke Protocol   No evidence of acute intracranial hemorrhage or large territory infarct. 1 cm dural based lesion of the left parietal convexity likely reflecting a meningioma.    Not a TpA candidate for low NIHSS and delayed presentation.     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 The PICA demonstrates diminished opacification. Corresponding ischemic penumbra within the right cerebellar hemisphere (Tmax >6s volume 15 cc, no abnormal CBF).  Slight irregularity of the lateral wall of the right cavernous ICA without discrete aneurysm.   Laryngeal findings suggestive of a right-sided vocal cord paralysis.      Impression:    Acute ischemic CVA- No tPA  HTN- new Dx  h/o HLD    PLAN    1.CNS : Monitor mental status, Avoid sedatives. . Follow up Neurology. c/w ASA, lipitor.   ct scan showed  *Occlusion of the proximal V4 segment of the right vertebral artery just after the PICA origin. The PICA demonstrates diminished opacification. Corresponding ischemic penumbra within the right cerebellar hemisphere (Tmax >6s volume 15 cc, no abnormal CBF).  SBP goal- 140-180 for the first 24-48 hrs  the patient has a hx of neck abscesses, f/u ct neck with contrast   has right sided weakness  MRI    No evidence of recent infarct or acute intracranial hemorrhage.     Mild chronic microvascular changes.  f/u echo cardio     2.  Slight irregularity of the lateral wall of the right cavernous ICA without discrete aneurysm.    3.  Laryngeal findings suggestive of a right-sided vocal cord paralysis, ENT concerned abt vocal cord paralysis, the patient has a hx of abscess in neck so they recommended a ct scan of the neck with iv contrast extending to the aorta  f/u ct can                   Feeding after Speech eval, NPO for now since he aspirated because of vocal cord paralysis       DVT ppx SCDs as per neuro

## 2021-06-04 NOTE — PROGRESS NOTE ADULT - ATTENDING COMMENTS
Clinically, acute lacunar stroke (ataxic HP) MR -ve. ASA and Statins for secondary stroke prophylaxis. Further work up at regular floor. PT/OT and discharge planning.

## 2021-06-04 NOTE — PROGRESS NOTE ADULT - SUBJECTIVE AND OBJECTIVE BOX
Neurocritical Care Progress Note    BRIGETTE Thomsony     [id number]   Daily Height in cm: 157.48 (2021 18:00)    Daily Weight in k.8 (2021 06:00)  COVID-19 PCR: NotDetec (2021 08:01)    Patient is a 55y old  Male who presents with a chief complaint of CVA (2021 09:12)    HPI:  Pt is a 55 y.o. male with PMH of HLD on statin and HTN? ( non compliant with Anti HTN meds) who presents to the ED for stroke like symptoms. Pt states he experienced since 1 am this morning-  HA, weakness, dizziness and R sided change in sensation of face and RUE. Upon EMS arrival, had difficulty ambulating. Denies taking any medications PTA. Pt admits to HA intermittently for past week, alleviated by NSAIDS. No head trauma, LOC seizure activity or ASA usage. VS on admission stable. NIHSS on arrival 3, CODE stroke on admission. CT Brain Stroke Protocol   No evidence of acute intracranial hemorrhage or large territory infarct. 1 cm dural based lesion of the left parietal convexity likely reflecting a meningioma. Not a TpA candidate for low NIHSS and delayed presentation. 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 The PICA demonstrates diminished opacification. Corresponding ischemic penumbra within the right cerebellar hemisphere (Tmax >6s volume 15 cc, no abnormal CBF). Slight irregularity of the lateral wall of the right cavernous ICA without discrete aneurysm. Laryngeal findings suggestive of a right-sided vocal cord paralysis. Pt seen by neurology. s/p 325 ASA, lipitor 80 and is being admitted for further management. (2021 10:56)    Allergies:  No Known Allergies    PAST MEDICAL & SURGICAL HISTORY:  Hyperlipidemia    Hypertension    Home Medications:  rosuvastatin 20 mg oral tablet: 1 tab(s) orally once a day (2021 11:11)    24 Hour Events:  Patient resting on bed. No complaints at this time. Neuro exam remains the same as yesterday. OK from a neurological standpoint to downgrade to 3E    Exam:  Neurologic Exam:  Mental status: Awake, alert and oriented to person, place, and time. Attention and concentration intact. Fund of knowledge appropriate  Language: Naming, repetition, fluency, and comprehension intact. No dysarthria, no aphasia  Cranial nerves: Pupils equally round and reactive to light. Visual fields full. Nystagmus present b/l. Extraocular muscles intact, V1 through V3 intact bilaterally and symmetric. Slight right facial droop. Hearing intact   Motor:  Normal bulk and tone. Strength:  3/5  5/5 in RUE  5/5 in LUE  5/5 in RLE  5/5 in LLE   strength 5/5  Motor: Kam spontaneously. No tremors or tics noted  Sensation: Intact to light touch, proprioception, and noxious stimuli. No neglect noted  Coordination: Ataxia noted right UE w/ clumsiness on finger-to-nose    NIH STROKE SCALE  Item	                                                        Score  1 a.	Level of Consciousness	               	0  1 b. LOC Questions	                                0  1 c.	LOC Commands	                               	0  2.	Best Gaze	                                        0  3.	Visual	                                                0  4.	Facial Palsy	                                        1  5 a.	Motor Arm - Left	                                0  5 b.	Motor Arm - Right	                        0  6 a.	Motor Leg - Left	                                0  6 b.	Motor Leg - Right	                                0  7.	Limb Ataxia	                                        1  8.	Sensory	                                                1  9.	Language	                                        0  10.	Dysarthria	                                        0  11.	Extinction and Inattention  	        0  ______________________________________  TOTAL	                                                        3    Admitted score:   3          Yesterdays score:   3       Todays score:    4    Current Medications:  acetaminophen   Tablet .. 650 milliGRAM(s) Oral every 6 hours PRN  aspirin  chewable 81 milliGRAM(s) Oral daily  atorvastatin 80 milliGRAM(s) Oral at bedtime  chlorhexidine 4% Liquid 1 Application(s) Topical <User Schedule>  enoxaparin Injectable 40 milliGRAM(s) SubCutaneous at bedtime  pantoprazole   Suspension 40 milliGRAM(s) Oral before breakfast    Vital Signs Last 24 Hrs  T(C): 36.2 (2021 08:00), Max: 37.2 (2021 20:00)  T(F): 97.1 (2021 08:00), Max: 98.9 (2021 20:00)  HR: 70 (2021 12:00) (66 - 86)  BP: 174/94 (2021 12:00) (154/95 - 187/98)  BP(mean): 135 (2021 12:00) (116 - 135)  RR: 15 (2021 12:00) (9 - 18)  SpO2: 98% (2021 12:00) (98% - 100%)     I/Os:  I&O's Detail    2021 07:01  -  2021 07:00  --------------------------------------------------------  IN:    IV PiggyBack: 100 mL  Total IN: 100 mL    OUT:    Voided (mL): 950 mL  Total OUT: 950 mL    Total NET: -850 mL      2021 07:01  -  2021 13:09  --------------------------------------------------------  IN:    Jevity 1.2: 180 mL  Total IN: 180 mL    OUT:    Voided (mL): 200 mL  Total OUT: 200 mL    Total NET: -20 mL    LIVER FUNCTIONS - ( 2021 04:25 )  Alb: 4.7 g/dL / Pro: 7.5 g/dL / ALK PHOS: 93 U/L / ALT: 44 U/L / AST: 47 U/L / GGT: x                               12.8   9.65  )-----------( 246      ( 2021 04:25 )             36.7      06-04    135  |  98  |  12  ----------------------------<  97  4.0   |  23  |  0.9    Ca    9.7      2021 04:25  Mg     2.1     06-04    TPro  7.5  /  Alb  4.7  /  TBili  0.4  /  DBili  x   /  AST  47<H>  /  ALT  44<H>  /  AlkPhos  93  06-04     PT/INR - ( 2021 08:20 )   PT: 10.80 sec;   INR: 0.94 ratio      PTT - ( 2021 08:20 )  PTT:32.1 sec  Adult Advanced Hemodynamics Last 24 Hrs  CVP(mm Hg): --  CVP(cm H2O): --  CO: --  CI: --  PA: --  PA(mean): --  PCWP: --  SVR: --  SVRI: --  PVR: --  PVRI: --  CARDIAC MARKERS ( 2021 08:20 )  x     / <0.01 ng/mL / x     / x     / x        Diagnostics/ Results:  Last CTH: < from: CT Brain Stroke Protocol (21 @ 07:58) >  IMPRESSION:    1.  No evidence of acute intracranial hemorrhage or large territory infarct.    2.  1 cm dural based lesion of the left parietal convexity likely reflecting a meningioma.    < end of copied text >    Last CTA/MRA: < from: CT Angio Neck w/ IV Cont (21 @ 08:09) >  IMPRESSION:    1.  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 The PICA demonstrates diminished opacification. Corresponding ischemic penumbra within the right cerebellar hemisphere (Tmax >6s volume 15 cc, no abnormal CBF).    2.  Slight irregularity of the lateral wall of the right cavernous ICA without discrete aneurysm.    3.  Laryngeal findings suggestive of a right-sided vocal cord paralysis.    < end of copied text >    Last CTP: < from: CT Perfusion w/ Maps w/ IV Cont (21 @ 08:08) >  IMPRESSION:    1.  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 The PICA demonstrates diminished opacification. Corresponding ischemic penumbra within the right cerebellar hemisphere (Tmax >6s volume 15 cc, no abnormal CBF).    2.  Slight irregularity of the lateral wall of the right cavernous ICA without discrete aneurysm.    3.  Laryngeal findings suggestive of a right-sided vocal cord paralysis.    < end of copied text >    Last MRI: < from: MR Head No Cont (21 @ 17:45) >  IMPRESSION:    1.  No evidence of recent infarct or acute intracranial hemorrhage.    2.  Mild chronic microvascular changes.    < end of copied text >    Last TCD: n/a    Last EEG: n/a    Last Echo: n/a    Last EKG: < from: 12 Lead ECG (21 @ 08:15) >  Diagnosis Line Normal sinus rhythm  Normal ECG    < end of copied text >    Chest Xray: < from: Xray Chest 1 View-PORTABLE IMMEDIATE (Xray Chest 1 View-PORTABLE IMMEDIATE .) (21 @ 22:40) >  Impression:  There is no evidence of focal consolidation, pleural effusion or pneumothorax.    < end of copied text >    ROS:  [X] A ten-point ROS was otherwise negative except as noted in HPI    Assessment:      Plan:  Neurology:      Respiratory:  -Keep HOB > 35; aspiration precautions     Cardiology:      Vascular:      GI/Nutrition:  -Diet, NPO with Tube Feed:   Tube Feeding Modality: Nasogastric  Jevity 1.2 Michael  Total Volume for 24 Hours (mL): 720  Bolus  Total Volume of Bolus (mL):  180  Tube Feed Frequency: Every 6 hours   Tube Feed Start Time: 11:00  Bolus Feed Rate (mL per Hour): 180   Bolus Feed Duration (in Hours): 1 (21 @ 09:19) [Active]        -Bowel Regimen ->  -PPx -> Pantoprazole       / Renal/ Fluids/ Electrolytes:      Hematology/ Oncology:  -DVT PPx -> Heparin SQ    Lovenox SQ    Heparin gtt     SCDs      Infectious Disease:      Musculoskeletol:      Endocrine:      Tubes/ Lines/ Drains:  Central    Peripheral    A-line    Cazares    NGT/ OGT    Chest    EVD    Disposition:  Continue medical management as per primary team in:   ICU   CCU   SICU   Stroke Unit   Stepdown    CODE STATUS w/ Next of Kin:   DNI   DNR   FULL    Patient seen and case discussed with NCC attending; see attending attestation  Please call w/ questions  Haley Thompson NP  [id number]             Neurocritical Care Progress Note    BRIGETTE Thomsony     [id number]   Daily Height in cm: 157.48 (2021 18:00)    Daily Weight in k.8 (2021 06:00)  COVID-19 PCR: NotDetec (2021 08:01)    Patient is a 55y old  Male who presents with a chief complaint of CVA (2021 09:12)    HPI:  Pt is a 55 y.o. male with PMH of HLD on statin and HTN? ( non compliant with Anti HTN meds) who presents to the ED for stroke like symptoms. Pt states he experienced since 1 am this morning-  HA, weakness, dizziness and R sided change in sensation of face and RUE. Upon EMS arrival, had difficulty ambulating. Denies taking any medications PTA. Pt admits to HA intermittently for past week, alleviated by NSAIDS. No head trauma, LOC seizure activity or ASA usage. VS on admission stable. NIHSS on arrival 3, CODE stroke on admission. CT Brain Stroke Protocol   No evidence of acute intracranial hemorrhage or large territory infarct. 1 cm dural based lesion of the left parietal convexity likely reflecting a meningioma. Not a TpA candidate for low NIHSS and delayed presentation. 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 The PICA demonstrates diminished opacification. Corresponding ischemic penumbra within the right cerebellar hemisphere (Tmax >6s volume 15 cc, no abnormal CBF). Slight irregularity of the lateral wall of the right cavernous ICA without discrete aneurysm. Laryngeal findings suggestive of a right-sided vocal cord paralysis. Pt seen by neurology. s/p 325 ASA, lipitor 80 and is being admitted for further management. (2021 10:56)    Allergies:  No Known Allergies    PAST MEDICAL & SURGICAL HISTORY:  Hyperlipidemia    Hypertension    Home Medications:  rosuvastatin 20 mg oral tablet: 1 tab(s) orally once a day (2021 11:11)    24 Hour Events:  Patient resting on bed. No complaints at this time. Neuro exam remains the same as yesterday. OK from a neurological standpoint to downgrade to 3E    Exam:  Neurologic Exam:  Mental status: Awake, alert and oriented to person, place, and time. Attention and concentration intact. Fund of knowledge appropriate  Language: Naming, repetition, fluency, and comprehension intact. No dysarthria, no aphasia  Cranial nerves: Pupils equally round and reactive to light. Visual fields full. Nystagmus present b/l. Extraocular muscles intact, V1 through V3 intact bilaterally and symmetric. Slight right facial droop. Hearing intact   Motor:  Normal bulk and tone. Strength:  3/5  5/5 in RUE  5/5 in LUE  5/5 in RLE  5/5 in LLE   strength 5/5  Motor: Kam spontaneously. No tremors or tics noted  Sensation: Intact to light touch, proprioception, and noxious stimuli. No neglect noted  Coordination: Ataxia noted right UE w/ clumsiness on finger-to-nose    NIH STROKE SCALE  Item	                                                        Score  1 a.	Level of Consciousness	               	0  1 b. LOC Questions	                                0  1 c.	LOC Commands	                               	0  2.	Best Gaze	                                        0  3.	Visual	                                                0  4.	Facial Palsy	                                        1  5 a.	Motor Arm - Left	                                0  5 b.	Motor Arm - Right	                        0  6 a.	Motor Leg - Left	                                0  6 b.	Motor Leg - Right	                                0  7.	Limb Ataxia	                                        1  8.	Sensory	                                                1  9.	Language	                                        0  10.	Dysarthria	                                        0  11.	Extinction and Inattention  	        0  ______________________________________  TOTAL	                                                        3    Admitted score:   3          Yesterdays score:   3       Todays score:    4    Current Medications:  acetaminophen   Tablet .. 650 milliGRAM(s) Oral every 6 hours PRN  aspirin  chewable 81 milliGRAM(s) Oral daily  atorvastatin 80 milliGRAM(s) Oral at bedtime  chlorhexidine 4% Liquid 1 Application(s) Topical <User Schedule>  enoxaparin Injectable 40 milliGRAM(s) SubCutaneous at bedtime  pantoprazole   Suspension 40 milliGRAM(s) Oral before breakfast    Vital Signs Last 24 Hrs  T(C): 36.2 (2021 08:00), Max: 37.2 (2021 20:00)  T(F): 97.1 (2021 08:00), Max: 98.9 (2021 20:00)  HR: 70 (2021 12:00) (66 - 86)  BP: 174/94 (2021 12:00) (154/95 - 187/98)  BP(mean): 135 (2021 12:00) (116 - 135)  RR: 15 (2021 12:00) (9 - 18)  SpO2: 98% (2021 12:00) (98% - 100%)     I/Os:  I&O's Detail    2021 07:01  -  2021 07:00  --------------------------------------------------------  IN:    IV PiggyBack: 100 mL  Total IN: 100 mL    OUT:    Voided (mL): 950 mL  Total OUT: 950 mL    Total NET: -850 mL      2021 07:01  -  2021 13:09  --------------------------------------------------------  IN:    Jevity 1.2: 180 mL  Total IN: 180 mL    OUT:    Voided (mL): 200 mL  Total OUT: 200 mL    Total NET: -20 mL    LIVER FUNCTIONS - ( 2021 04:25 )  Alb: 4.7 g/dL / Pro: 7.5 g/dL / ALK PHOS: 93 U/L / ALT: 44 U/L / AST: 47 U/L / GGT: x                               12.8   9.65  )-----------( 246      ( 2021 04:25 )             36.7      06-04    135  |  98  |  12  ----------------------------<  97  4.0   |  23  |  0.9    Ca    9.7      2021 04:25  Mg     2.1     06-04    TPro  7.5  /  Alb  4.7  /  TBili  0.4  /  DBili  x   /  AST  47<H>  /  ALT  44<H>  /  AlkPhos  93  06-04     PT/INR - ( 2021 08:20 )   PT: 10.80 sec;   INR: 0.94 ratio      PTT - ( 2021 08:20 )  PTT:32.1 sec  Adult Advanced Hemodynamics Last 24 Hrs  CVP(mm Hg): --  CVP(cm H2O): --  CO: --  CI: --  PA: --  PA(mean): --  PCWP: --  SVR: --  SVRI: --  PVR: --  PVRI: --  CARDIAC MARKERS ( 2021 08:20 )  x     / <0.01 ng/mL / x     / x     / x        Diagnostics/ Results:  Last CTH: < from: CT Brain Stroke Protocol (21 @ 07:58) >  IMPRESSION:    1.  No evidence of acute intracranial hemorrhage or large territory infarct.    2.  1 cm dural based lesion of the left parietal convexity likely reflecting a meningioma.    < end of copied text >    Last CTA/MRA: < from: CT Angio Neck w/ IV Cont (21 @ 08:09) >  IMPRESSION:    1.  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 The PICA demonstrates diminished opacification. Corresponding ischemic penumbra within the right cerebellar hemisphere (Tmax >6s volume 15 cc, no abnormal CBF).    2.  Slight irregularity of the lateral wall of the right cavernous ICA without discrete aneurysm.    3.  Laryngeal findings suggestive of a right-sided vocal cord paralysis.    < end of copied text >    Last CTP: < from: CT Perfusion w/ Maps w/ IV Cont (21 @ 08:08) >  IMPRESSION:    1.  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 The PICA demonstrates diminished opacification. Corresponding ischemic penumbra within the right cerebellar hemisphere (Tmax >6s volume 15 cc, no abnormal CBF).    2.  Slight irregularity of the lateral wall of the right cavernous ICA without discrete aneurysm.    3.  Laryngeal findings suggestive of a right-sided vocal cord paralysis.    < end of copied text >    Last MRI: < from: MR Head No Cont (21 @ 17:45) >  IMPRESSION:    1.  No evidence of recent infarct or acute intracranial hemorrhage.    2.  Mild chronic microvascular changes.    < end of copied text >    Last TCD: n/a    Last EEG: n/a    Last Echo: < from: TTE Echo Complete w/o Contrast w/ Doppler (21 @ 11:14) >  Summary:   1. Normal global left ventricular systolic function.   2. LV Ejection Fraction by Santos's Method with a biplane EF of 68 %.   3. Mildly increased LV wall thickness.   4. Normal left ventricular internal cavity size.   5. The mean global longitudinal peak strain by speckle tracking is -20.3% which is normal.   6. Normal left atrial size.   7. Normal right atrial size.   8. No evidence of mitral valve regurgitation.   9. Mild tricuspid regurgitation.  10. Large patent foramen ovale, with predominantly left to right shunting across the atrial septum.  11. Increased relative wall thickness with normal mass index consistent with left ventricular concentric remodeling.  12. Intra-atrial septal aneurysm.  13. LA volume Index is 27.9 ml/m² ml/m2.    < end of copied text >    Last EKG: < from: 12 Lead ECG (21 @ 08:15) >  Diagnosis Line Normal sinus rhythm  Normal ECG    < end of copied text >    Chest Xray: < from: Xray Chest 1 View-PORTABLE IMMEDIATE (Xray Chest 1 View-PORTABLE IMMEDIATE .) (21 @ 22:40) >  Impression:  There is no evidence of focal consolidation, pleural effusion or pneumothorax.    < end of copied text >    ROS:  [X] A ten-point ROS was otherwise negative except as noted in HPI    Assessment:  Patient seen for ataxic hemiparesis right side. 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 Not TPA candidate due to being outside temporal window. Neuroendovascular confirmed there would be no intervention warranted for patients current symptoms. Q1h neuro checks was recommended overnight in ICU. Initial CTH demonstrates 1 cm dural based lesion of the left parietal convexity likely reflecting a meningioma. CTA suggestive of occlusion of the proximal V4 segment of the right vertebral artery and laryngeal findings suggestive of a right-sided vocal cord paralysis. Echo demonstrates intra-atrial septal aneurysm and large patent foramen ovale, with predominantly left to right shunting across the atrial septum. Today, patient's exam remains the same with no worsening. Likely a lacunar stroke. OK to downgrade to 3E from a neurological standpoint and no longer needs crit care monitoring.     Plan:  Neurology:  -OK to downgrade to 3E from a neurological standpoint  -Continue ASA    Respiratory:  -Keep HOB > 35; aspiration precautions   -Keeps satts >94%    Cardiology:  -Keep SBP goal 120-180  -ECHO: Intra-atrial septal aneurysm and large patent foramen ovale, with predominantly left to right shunting across the atrial septum    GI/Nutrition:  -Diet, NPO with Tube Feed: Tube Feeding Modality: Nasogastric Jevity 1.2 Michael  -Bowel Regimen ->n/a  -PPx -> Pantoprazole   -Continue with speech and swallow eval when able  -CT neck: right vocal cord paralysis without evidence of laryngeal mass lesion    / Renal/ Fluids/ Electrolytes:  -Keep euvolemic  -Monitor and treat lytes PRN    Hematology/ Oncology:  -DVT PPx -> Heparin SQ    Lovenox SQ    Heparin gtt     SCDs    Infectious Disease:  -Monitor and treat fevers; tylenol PRN     Musculoskeletol:  -OOB--> chair, ambulating, PT/OT    Endocrine:  -Continue lipitor   -Lipid profile (completed)    Tubes/ Lines/ Drains:  Central    Peripheral    A-line    Cazares    NGT/ OGT    Chest    EVD    Disposition:  Continue medical management as per primary team in:   ICU   CCU   SICU   Stroke Unit   Stepdown    CODE STATUS w/ Next of Kin:   DNI   DNR   FULL    Patient seen and case discussed with NCC attending; see attending attestation  Please call w/ questions  Haley Thompson NP  [phone]

## 2021-06-04 NOTE — PROGRESS NOTE ADULT - SUBJECTIVE AND OBJECTIVE BOX
OVERNIGHT EVENTS: events noted, CT neck done, ENT R VCP, no events overnight, no drips    Vital Signs Last 24 Hrs  T(C): 36.2 (04 Jun 2021 08:00), Max: 37.2 (03 Jun 2021 20:00)  T(F): 97.1 (04 Jun 2021 08:00), Max: 98.9 (03 Jun 2021 20:00)  HR: 70 (04 Jun 2021 08:00) (66 - 91)  BP: 173/97 (04 Jun 2021 08:00) (154/95 - 187/98)  BP(mean): 134 (04 Jun 2021 08:00) (116 - 134)  RR: 14 (04 Jun 2021 08:00) (9 - 18)  SpO2: 98% (04 Jun 2021 08:00) (98% - 100%)    PHYSICAL EXAMINATION:    GENERAL: ill looking    HEENT: Head is normocephalic and atraumatic.    NECK: Supple.    LUNGS: dec bs both bases    HEART: Regular rate and rhythm without murmur.    ABDOMEN: Soft, nontender, and nondistended.      EXTREMITIES: Without any cyanosis, clubbing, rash, lesions or edema.    NEUROLOGIC: r facial droop, hemianopsia    SKIN: No ulceration or induration present.      LABS:                        12.8   9.65  )-----------( 246      ( 04 Jun 2021 04:25 )             36.7     06-04    135  |  98  |  12  ----------------------------<  97  4.0   |  23  |  0.9    Ca    9.7      04 Jun 2021 04:25  Mg     2.1     06-04    TPro  7.5  /  Alb  4.7  /  TBili  0.4  /  DBili  x   /  AST  47<H>  /  ALT  44<H>  /  AlkPhos  93  06-04    PT/INR - ( 03 Jun 2021 08:20 )   PT: 10.80 sec;   INR: 0.94 ratio         PTT - ( 03 Jun 2021 08:20 )  PTT:32.1 sec      CARDIAC MARKERS ( 03 Jun 2021 08:20 )  x     / <0.01 ng/mL / x     / x     / x                      06-03-21 @ 07:01  -  06-04-21 @ 07:00  --------------------------------------------------------  IN: 100 mL / OUT: 950 mL / NET: -850 mL    06-04-21 @ 07:01  -  06-04-21 @ 09:14  --------------------------------------------------------  IN: 0 mL / OUT: 200 mL / NET: -200 mL        MICROBIOLOGY:      MEDICATIONS  (STANDING):  aspirin  chewable 81 milliGRAM(s) Oral daily  atorvastatin 80 milliGRAM(s) Oral at bedtime  chlorhexidine 4% Liquid 1 Application(s) Topical <User Schedule>  pantoprazole   Suspension 40 milliGRAM(s) Oral before breakfast    MEDICATIONS  (PRN):      RADIOLOGY & ADDITIONAL STUDIES:

## 2021-06-04 NOTE — PROGRESS NOTE ADULT - ASSESSMENT
Impression:    Acute ischemic CVA- No tPA  HTN- new Dx  h/o HLD    PLAN    1.CNS : Monitor mental status, Avoid sedatives. Neuro check q1. Follow up Neurology. c/w ASA, lipitor.   ct scan showed  *Occlusion of the proximal V4 segment of the right vertebral artery just after the PICA origin. The PICA demonstrates diminished opacification. Corresponding ischemic penumbra within the right cerebellar hemisphere (Tmax >6s volume 15 cc, no abnormal CBF).  the patient has a hx of neck abbesses, f/u ct neck with contrast   has right sided weakness    2.  Slight irregularity of the lateral wall of the right cavernous ICA without discrete aneurysm.    3.  Laryngeal findings suggestive of a right-sided vocal cord paralysis.    Follow up brain MRI    2. CVS : Keep even balance. echo. SBP goal- 140-180    3. PULMONARY : HOB at 45. Oral care, Supplemental oxygen if needed.     4. INFECTIOUS DISEASE : monitor off abx, the     5. GI-  Feeding after Speech eval, NPO for now    6. RENAL and acid base : Monitor Cr and lytes.     7. Endocrine : Check a1c, lipid profile. Insulin protocol if needed     8. Hematology : DVT ppx SCDs as per neuro         Dispo MICU

## 2021-06-05 LAB
ALBUMIN SERPL ELPH-MCNC: 4.4 G/DL — SIGNIFICANT CHANGE UP (ref 3.5–5.2)
ALP SERPL-CCNC: 102 U/L — SIGNIFICANT CHANGE UP (ref 30–115)
ALT FLD-CCNC: 41 U/L — SIGNIFICANT CHANGE UP (ref 0–41)
ANION GAP SERPL CALC-SCNC: 11 MMOL/L — SIGNIFICANT CHANGE UP (ref 7–14)
AST SERPL-CCNC: 48 U/L — HIGH (ref 0–41)
BILIRUB SERPL-MCNC: 0.3 MG/DL — SIGNIFICANT CHANGE UP (ref 0.2–1.2)
BUN SERPL-MCNC: 22 MG/DL — HIGH (ref 10–20)
CALCIUM SERPL-MCNC: 9.8 MG/DL — SIGNIFICANT CHANGE UP (ref 8.5–10.1)
CHLORIDE SERPL-SCNC: 100 MMOL/L — SIGNIFICANT CHANGE UP (ref 98–110)
CO2 SERPL-SCNC: 27 MMOL/L — SIGNIFICANT CHANGE UP (ref 17–32)
CREAT SERPL-MCNC: 1 MG/DL — SIGNIFICANT CHANGE UP (ref 0.7–1.5)
GLUCOSE SERPL-MCNC: 151 MG/DL — HIGH (ref 70–99)
HCT VFR BLD CALC: 39.5 % — LOW (ref 42–52)
HGB BLD-MCNC: 13.3 G/DL — LOW (ref 14–18)
MAGNESIUM SERPL-MCNC: 2.4 MG/DL — SIGNIFICANT CHANGE UP (ref 1.8–2.4)
MCHC RBC-ENTMCNC: 28.7 PG — SIGNIFICANT CHANGE UP (ref 27–31)
MCHC RBC-ENTMCNC: 33.7 G/DL — SIGNIFICANT CHANGE UP (ref 32–37)
MCV RBC AUTO: 85.1 FL — SIGNIFICANT CHANGE UP (ref 80–94)
NRBC # BLD: 0 /100 WBCS — SIGNIFICANT CHANGE UP (ref 0–0)
PLATELET # BLD AUTO: 262 K/UL — SIGNIFICANT CHANGE UP (ref 130–400)
POTASSIUM SERPL-MCNC: 4 MMOL/L — SIGNIFICANT CHANGE UP (ref 3.5–5)
POTASSIUM SERPL-SCNC: 4 MMOL/L — SIGNIFICANT CHANGE UP (ref 3.5–5)
PROT SERPL-MCNC: 7.1 G/DL — SIGNIFICANT CHANGE UP (ref 6–8)
RBC # BLD: 4.64 M/UL — LOW (ref 4.7–6.1)
RBC # FLD: 14.2 % — SIGNIFICANT CHANGE UP (ref 11.5–14.5)
SODIUM SERPL-SCNC: 138 MMOL/L — SIGNIFICANT CHANGE UP (ref 135–146)
WBC # BLD: 5.44 K/UL — SIGNIFICANT CHANGE UP (ref 4.8–10.8)
WBC # FLD AUTO: 5.44 K/UL — SIGNIFICANT CHANGE UP (ref 4.8–10.8)

## 2021-06-05 PROCEDURE — 99232 SBSQ HOSP IP/OBS MODERATE 35: CPT

## 2021-06-05 RX ORDER — AMLODIPINE BESYLATE 2.5 MG/1
2.5 TABLET ORAL DAILY
Refills: 0 | Status: DISCONTINUED | OUTPATIENT
Start: 2021-06-05 | End: 2021-06-08

## 2021-06-05 RX ORDER — OXYCODONE AND ACETAMINOPHEN 5; 325 MG/1; MG/1
1 TABLET ORAL ONCE
Refills: 0 | Status: DISCONTINUED | OUTPATIENT
Start: 2021-06-05 | End: 2021-06-05

## 2021-06-05 RX ADMIN — Medication 650 MILLIGRAM(S): at 06:45

## 2021-06-05 RX ADMIN — ATORVASTATIN CALCIUM 80 MILLIGRAM(S): 80 TABLET, FILM COATED ORAL at 00:24

## 2021-06-05 RX ADMIN — ATORVASTATIN CALCIUM 80 MILLIGRAM(S): 80 TABLET, FILM COATED ORAL at 21:00

## 2021-06-05 RX ADMIN — ENOXAPARIN SODIUM 40 MILLIGRAM(S): 100 INJECTION SUBCUTANEOUS at 21:00

## 2021-06-05 RX ADMIN — Medication 650 MILLIGRAM(S): at 22:00

## 2021-06-05 RX ADMIN — PANTOPRAZOLE SODIUM 40 MILLIGRAM(S): 20 TABLET, DELAYED RELEASE ORAL at 06:43

## 2021-06-05 RX ADMIN — CHLORHEXIDINE GLUCONATE 1 APPLICATION(S): 213 SOLUTION TOPICAL at 06:43

## 2021-06-05 RX ADMIN — AMLODIPINE BESYLATE 2.5 MILLIGRAM(S): 2.5 TABLET ORAL at 14:53

## 2021-06-05 RX ADMIN — Medication 650 MILLIGRAM(S): at 21:30

## 2021-06-05 RX ADMIN — Medication 81 MILLIGRAM(S): at 14:53

## 2021-06-05 RX ADMIN — Medication 650 MILLIGRAM(S): at 14:53

## 2021-06-05 RX ADMIN — Medication 650 MILLIGRAM(S): at 14:54

## 2021-06-05 RX ADMIN — ENOXAPARIN SODIUM 40 MILLIGRAM(S): 100 INJECTION SUBCUTANEOUS at 00:24

## 2021-06-05 NOTE — CHART NOTE - NSCHARTNOTEFT_GEN_A_CORE
MICU Transfer Note    Transfer from: MICU  Transfer to:  (  ) Medicine    (  ) Telemetry    (  ) RCU    (  ) Palliative    (  ) Stroke Unit    (  ) _______________  Accepting physican:    HPI:  Pt is a 55 y.o. male with PMH of HLD on statin and HTN? ( non compliant with Anti HTN meds) who presents to the ED for stroke like symptoms.   Pt states he experienced since 1 am this morning-  HA, weakness, dizziness and R sided change in sensation of face and RUE. Upon EMS arrival, had difficulty ambulating. Denies taking any medications PTA. Pt admits to HA intermittently for past week, alleviated by NSAIDS. No head trauma, LOC seizure activity or ASA usage.  VS on admission stable. NIHSS on arrival 3, CODE stroke on admission.   CT Brain Stroke Protocol   No evidence of acute intracranial hemorrhage or large territory infarct. 1 cm dural based lesion of the left parietal convexity likely reflecting a meningioma.  Not a TpA candidate for low NIHSS and delayed presentation.   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 The PICA demonstrates diminished opacification. Corresponding ischemic penumbra within the right cerebellar hemisphere (Tmax >6s volume 15 cc, no abnormal CBF).  Slight irregularity of the lateral wall of the right cavernous ICA without discrete aneurysm.  Laryngeal findings suggestive of a right-sided vocal cord paralysis.  Pt seen by neurology. s/p 325 ASA, lipitor 80 and is being admitted for further management.       MICU COURSE:          ASSESSMENT & PLAN:         For Follow-Up:          Vital Signs Last 24 Hrs  T(C): 36.8 (05 Jun 2021 09:00), Max: 36.8 (05 Jun 2021 09:00)  T(F): 98.2 (05 Jun 2021 09:00), Max: 98.2 (05 Jun 2021 09:00)  HR: 86 (05 Jun 2021 15:00) (70 - 88)  BP: 145/91 (05 Jun 2021 15:00) (145/91 - 181/100)  BP(mean): 123 (05 Jun 2021 15:00) (105 - 131)  RR: 18 (05 Jun 2021 15:00) (10 - 25)  SpO2: 98% (05 Jun 2021 15:00) (97% - 99%)  I&O's Summary    04 Jun 2021 07:01  -  05 Jun 2021 07:00  --------------------------------------------------------  IN: 920 mL / OUT: 800 mL / NET: 120 mL    05 Jun 2021 07:01  -  05 Jun 2021 15:14  --------------------------------------------------------  IN: 180 mL / OUT: 200 mL / NET: -20 mL          MEDICATIONS  (STANDING):  amLODIPine   Tablet 2.5 milliGRAM(s) Oral daily  aspirin  chewable 81 milliGRAM(s) Oral daily  atorvastatin 80 milliGRAM(s) Oral at bedtime  chlorhexidine 4% Liquid 1 Application(s) Topical <User Schedule>  enoxaparin Injectable 40 milliGRAM(s) SubCutaneous at bedtime  pantoprazole   Suspension 40 milliGRAM(s) Oral before breakfast    MEDICATIONS  (PRN):  acetaminophen   Tablet .. 650 milliGRAM(s) Oral every 6 hours PRN Moderate Pain (4 - 6)        LABS                                            13.3                  Neurophils% (auto):   x      (06-05 @ 04:38):    5.44 )-----------(262          Lymphocytes% (auto):  x                                             39.5                   Eosinphils% (auto):   x        Manual%: Neutrophils x    ; Lymphocytes x    ; Eosinophils x    ; Bands%: x    ; Blasts x                                    138    |  100    |  22                  Calcium: 9.8   / iCa: x      (06-05 @ 04:38)    ----------------------------<  151       Magnesium: 2.4                              4.0     |  27     |  1.0              Phosphorous: x        TPro  7.1    /  Alb  4.4    /  TBili  0.3    /  DBili  x      /  AST  48     /  ALT  41     /  AlkPhos  102    05 Jun 2021 04:38          Signed out to MICU Transfer Note    Transfer from: MICU  Transfer to:  ( x ) Medicine    (  ) Telemetry    (  ) RCU    (  ) Palliative    (  ) Stroke Unit    (  ) _______________  Accepting physican:    Pt is a 55 y.o. male with PMH of HLD on statin and HTN? ( non compliant with Anti HTN meds) who presents to the ED for stroke like symptoms.   Pt states he experienced since 1 am this morning-  HA, weakness, dizziness and R sided change in sensation of face and RUE. Upon EMS arrival, had difficulty ambulating. Denies taking any medications PTA. Pt admits to HA intermittently for past week, alleviated by NSAIDS. No head trauma, LOC seizure activity or ASA usage.  VS on admission stable. NIHSS on arrival 3, CODE stroke on admission.   CT Brain Stroke Protocol   No evidence of acute intracranial hemorrhage or large territory infarct. 1 cm dural based lesion of the left parietal convexity likely reflecting a meningioma.  Not a TpA candidate for low NIHSS and delayed presentation.   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 The PICA demonstrates diminished opacification. Corresponding ischemic penumbra within the right cerebellar hemisphere (Tmax >6s volume 15 cc, no abnormal CBF).  Slight irregularity of the lateral wall of the right cavernous ICA without discrete aneurysm.  Laryngeal findings suggestive of a right-sided vocal cord paralysis.  Pt seen by neurology. Clinically, acute lacunar stroke (ataxic HP) MR -ve. ASA and Statins for secondary stroke prophylaxis. Further work up at regular floor. PT/OT and discharge planning.     For Follow-Up:    - PT/OT  - Neuro f/u       Vital Signs Last 24 Hrs  T(C): 36.8 (05 Jun 2021 09:00), Max: 36.8 (05 Jun 2021 09:00)  T(F): 98.2 (05 Jun 2021 09:00), Max: 98.2 (05 Jun 2021 09:00)  HR: 86 (05 Jun 2021 15:00) (70 - 88)  BP: 145/91 (05 Jun 2021 15:00) (145/91 - 181/100)  BP(mean): 123 (05 Jun 2021 15:00) (105 - 131)  RR: 18 (05 Jun 2021 15:00) (10 - 25)  SpO2: 98% (05 Jun 2021 15:00) (97% - 99%)  I&O's Summary    04 Jun 2021 07:01  -  05 Jun 2021 07:00  --------------------------------------------------------  IN: 920 mL / OUT: 800 mL / NET: 120 mL    05 Jun 2021 07:01  -  05 Jun 2021 15:14  --------------------------------------------------------  IN: 180 mL / OUT: 200 mL / NET: -20 mL          MEDICATIONS  (STANDING):  amLODIPine   Tablet 2.5 milliGRAM(s) Oral daily  aspirin  chewable 81 milliGRAM(s) Oral daily  atorvastatin 80 milliGRAM(s) Oral at bedtime  chlorhexidine 4% Liquid 1 Application(s) Topical <User Schedule>  enoxaparin Injectable 40 milliGRAM(s) SubCutaneous at bedtime  pantoprazole   Suspension 40 milliGRAM(s) Oral before breakfast    MEDICATIONS  (PRN):  acetaminophen   Tablet .. 650 milliGRAM(s) Oral every 6 hours PRN Moderate Pain (4 - 6)        LABS                                            13.3                  Neurophils% (auto):   x      (06-05 @ 04:38):    5.44 )-----------(262          Lymphocytes% (auto):  x                                             39.5                   Eosinphils% (auto):   x        Manual%: Neutrophils x    ; Lymphocytes x    ; Eosinophils x    ; Bands%: x    ; Blasts x                                    138    |  100    |  22                  Calcium: 9.8   / iCa: x      (06-05 @ 04:38)    ----------------------------<  151       Magnesium: 2.4                              4.0     |  27     |  1.0              Phosphorous: x        TPro  7.1    /  Alb  4.4    /  TBili  0.3    /  DBili  x      /  AST  48     /  ALT  41     /  AlkPhos  102    05 Jun 2021 04:38          Signed out to

## 2021-06-05 NOTE — DIETITIAN INITIAL EVALUATION ADULT. - RD TO REMAIN AVAILABLE
Intervention: 1.Enteral Nutrition 2.Coordination of Care    Monitor/Evaluate: Diet order, energy intake, nutrition focused physical findings, glucose and anemia profile/yes

## 2021-06-05 NOTE — PROGRESS NOTE ADULT - SUBJECTIVE AND OBJECTIVE BOX
ICU Attending Progress Daily Note     05 Jun 2021 13:47  pt not arrousble abrly open eyes to painful stimuli  He has history of Hyperlipidemia    Hypertension      Interval event for past 24 hr:  BRIGETTE CARRILLO  55y had no event.   Current Complains:  BRIGETTE CARRILLO has no new complains  HPI:  Pt is a 55 y.o. male with PMH of HLD on statin and HTN? ( non compliant with Anti HTN meds) who presents to the ED for stroke like symptoms.   Pt states he experienced since 1 am this morning-  HA, weakness, dizziness and R sided change in sensation of face and RUE. Upon EMS arrival, had difficulty ambulating. Denies taking any medications PTA. Pt admits to HA intermittently for past week, alleviated by NSAIDS. No head trauma, LOC seizure activity or ASA usage.  VS on admission stable. NIHSS on arrival 3, CODE stroke on admission.      CT Brain Stroke Protocol   No evidence of acute intracranial hemorrhage or large territory infarct. 1 cm dural based lesion of the left parietal convexity likely reflecting a meningioma.    Not a TpA candidate for low NIHSS and delayed presentation.     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 The PICA demonstrates diminished opacification. Corresponding ischemic penumbra within the right cerebellar hemisphere (Tmax >6s volume 15 cc, no abnormal CBF).      Slight irregularity of the lateral wall of the right cavernous ICA without discrete aneurysm.     Laryngeal findings suggestive of a right-sided vocal cord paralysis.    Pt seen by neurology. s/p 325 ASA, lipitor 80 and is being admitted for further management.      (03 Jun 2021 10:56)    OBJECTIVE:  ICU Vital Signs Last 24 Hrs  T(C): 36.8 (05 Jun 2021 09:00), Max: 36.8 (05 Jun 2021 09:00)  T(F): 98.2 (05 Jun 2021 09:00), Max: 98.2 (05 Jun 2021 09:00)  HR: 86 (05 Jun 2021 09:00) (70 - 86)  BP: 154/98 (05 Jun 2021 09:00) (146/84 - 181/100)  BP(mean): 117 (05 Jun 2021 09:00) (105 - 131)  ABP: --  ABP(mean): --  RR: 12 (05 Jun 2021 09:00) (10 - 25)  SpO2: 97% (05 Jun 2021 09:00) (97% - 99%)    I&O's Summary    04 Jun 2021 07:01  -  05 Jun 2021 07:00  --------------------------------------------------------  IN: 920 mL / OUT: 800 mL / NET: 120 mL    05 Jun 2021 07:01  -  05 Jun 2021 13:47  --------------------------------------------------------  IN: 0 mL / OUT: 200 mL / NET: -200 mL      I&O's Detail    04 Jun 2021 07:01  -  05 Jun 2021 07:00  --------------------------------------------------------  IN:    Enteral Tube Flush: 200 mL    Jevity 1.2: 720 mL  Total IN: 920 mL    OUT:    Voided (mL): 800 mL  Total OUT: 800 mL    Total NET: 120 mL      05 Jun 2021 07:01  -  05 Jun 2021 13:47  --------------------------------------------------------  IN:  Total IN: 0 mL    OUT:    Voided (mL): 200 mL  Total OUT: 200 mL    Total NET: -200 mL          CAPILLARY BLOOD GLUCOSE        LABS:                          13.3   5.44  )-----------( 262      ( 05 Jun 2021 04:38 )             39.5     06-05    138  |  100  |  22<H>  ----------------------------<  151<H>  4.0   |  27  |  1.0    Ca    9.8      05 Jun 2021 04:38  Mg     2.4     06-05    TPro  7.1  /  Alb  4.4  /  TBili  0.3  /  DBili  x   /  AST  48<H>  /  ALT  41  /  AlkPhos  102  06-05          Home Medications:  rosuvastatin 20 mg oral tablet: 1 tab(s) orally once a day (03 Jun 2021 11:11)    HOSPITAL MEDICATIONS:  MEDICATIONS  (STANDING):  amLODIPine   Tablet 2.5 milliGRAM(s) Oral daily  aspirin  chewable 81 milliGRAM(s) Oral daily  atorvastatin 80 milliGRAM(s) Oral at bedtime  chlorhexidine 4% Liquid 1 Application(s) Topical <User Schedule>  enoxaparin Injectable 40 milliGRAM(s) SubCutaneous at bedtime  pantoprazole   Suspension 40 milliGRAM(s) Oral before breakfast    MEDICATIONS  (PRN):  acetaminophen   Tablet .. 650 milliGRAM(s) Oral every 6 hours PRN Moderate Pain (4 - 6)      REVIEW OF SYSTEMS:  CONSTITUTIONAL: [X] all negative; [ ] weakness, [ ] fevers, [ ] chills  EYES/ENT: [X] all negative; [ ] visual changes, [ ] vertigo, [ ] throat pain   NECK: [X] all negative; [ ] pain, [ ] stiffness  RESPIRATORY: [] all negative, [ ] cough, [ ] wheezing, [ ] hemoptysis, [ ] shortness of breath  CARDIOVASCULAR: [] all negative; [ ] chest pain, [ ] palpitations, [ ] orthopnea  GASTROINTESTINAL: [X] all negative; [ ]abdominal pain, [ ] nausea, [ ] vomiting, [ ] hematemesis, [ ] diarrhea, [ ] constipation, [ ] melena, [ ] hematochezia.  GENITOURINARY: [X] all negative; [ ] dysuria, [ ] frequency, [ ] hematuria  NEUROLOGICAL: [X] all negative; [ ] numbness, [ ] weakness  SKIN: [X] all negative; [ ] itching, [ ] burning, [ ] rashes, [ ] lesions   All other review of systems is negative unless indicated above.    [  ] Unable to assess ROS because     PHYSICAL EXAM:          CONSTITUTIONAL: Well-developed; well-nourished; in no acute distress.   	SKIN: warm, dry  	HEAD: Normocephalic; atraumatic.  	EYES: PERRL, EOM, no conj injection, sclera clear  	ENT: No nasal discharge; airway clear.  	NECK: Supple; non tender.  No midline ttp ctls  	CARD: S1, S2 normal; no murmurs, gallops, or rubs. Regular rate and rhythm. 2+ RPs and DPs bilat, no carotid bruits, no pedal   edema, no calf pain b/l  	RESP: CTA  bilat good air movement No wheezes, rales or rhonchi.  	ABD: Soft, not tender, not distended, no CVA ttp no rebound or guarding, bowel sounds present  	EXT: Normal ROM.  No clubbing, cyanosis or edema.   	  	NEURO: Alert, awake, motor 5/5 R, 5/5 L        RADIOLOGY:  xray  < from: CT Neck Soft Tissue w/ IV Cont (06.04.21 @ 02:36) >  IMPRESSION:  Redemonstration of right vocal cord paralysis without evidence of laryngeal mass lesion, other compressive lesionor lymphadenopathy.    There is ill-defined asymmetric soft tissue enhancement involving the right perimandibular/gingival soft tissues. Correlation with physical examination is recommended to exclude underlying mass lesion.    Redemonstration of high-grade stenosis versus occlusion of the distal right V4 segment of the vertebral artery.        < end of copied text >    I spent 45 minutes of critical care time examining patient, reviewing vitals, labs, medications, imaging and discussing with the team goals of care to prevent life-threatening in this patient who is at high risk for deterioration or death due to:   ICU Attending Progress Daily Note     05 Jun 2021 13:47    He has history of Hyperlipidemia    Hypertension      Interval event for past 24 hr:  BRIGETTE CARRILLO  55y had no event.   Current Complains:  BRIGETTE CARRILLO has no new complains  HPI:  Pt is a 55 y.o. male with PMH of HLD on statin and HTN? ( non compliant with Anti HTN meds) who presents to the ED for stroke like symptoms.   Pt states he experienced since 1 am this morning-  HA, weakness, dizziness and R sided change in sensation of face and RUE. Upon EMS arrival, had difficulty ambulating. Denies taking any medications PTA. Pt admits to HA intermittently for past week, alleviated by NSAIDS. No head trauma, LOC seizure activity or ASA usage.  VS on admission stable. NIHSS on arrival 3, CODE stroke on admission.      CT Brain Stroke Protocol   No evidence of acute intracranial hemorrhage or large territory infarct. 1 cm dural based lesion of the left parietal convexity likely reflecting a meningioma.    Not a TpA candidate for low NIHSS and delayed presentation.     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 The PICA demonstrates diminished opacification. Corresponding ischemic penumbra within the right cerebellar hemisphere (Tmax >6s volume 15 cc, no abnormal CBF).      Slight irregularity of the lateral wall of the right cavernous ICA without discrete aneurysm.     Laryngeal findings suggestive of a right-sided vocal cord paralysis.    Pt seen by neurology. s/p 325 ASA, lipitor 80 and is being admitted for further management.      (03 Jun 2021 10:56)    OBJECTIVE:  ICU Vital Signs Last 24 Hrs  T(C): 36.8 (05 Jun 2021 09:00), Max: 36.8 (05 Jun 2021 09:00)  T(F): 98.2 (05 Jun 2021 09:00), Max: 98.2 (05 Jun 2021 09:00)  HR: 86 (05 Jun 2021 09:00) (70 - 86)  BP: 154/98 (05 Jun 2021 09:00) (146/84 - 181/100)  BP(mean): 117 (05 Jun 2021 09:00) (105 - 131)  ABP: --  ABP(mean): --  RR: 12 (05 Jun 2021 09:00) (10 - 25)  SpO2: 97% (05 Jun 2021 09:00) (97% - 99%)    I&O's Summary    04 Jun 2021 07:01  -  05 Jun 2021 07:00  --------------------------------------------------------  IN: 920 mL / OUT: 800 mL / NET: 120 mL    05 Jun 2021 07:01  -  05 Jun 2021 13:47  --------------------------------------------------------  IN: 0 mL / OUT: 200 mL / NET: -200 mL      I&O's Detail    04 Jun 2021 07:01  -  05 Jun 2021 07:00  --------------------------------------------------------  IN:    Enteral Tube Flush: 200 mL    Jevity 1.2: 720 mL  Total IN: 920 mL    OUT:    Voided (mL): 800 mL  Total OUT: 800 mL    Total NET: 120 mL      05 Jun 2021 07:01  -  05 Jun 2021 13:47  --------------------------------------------------------  IN:  Total IN: 0 mL    OUT:    Voided (mL): 200 mL  Total OUT: 200 mL    Total NET: -200 mL          CAPILLARY BLOOD GLUCOSE        LABS:                          13.3   5.44  )-----------( 262      ( 05 Jun 2021 04:38 )             39.5     06-05    138  |  100  |  22<H>  ----------------------------<  151<H>  4.0   |  27  |  1.0    Ca    9.8      05 Jun 2021 04:38  Mg     2.4     06-05    TPro  7.1  /  Alb  4.4  /  TBili  0.3  /  DBili  x   /  AST  48<H>  /  ALT  41  /  AlkPhos  102  06-05          Home Medications:  rosuvastatin 20 mg oral tablet: 1 tab(s) orally once a day (03 Jun 2021 11:11)    HOSPITAL MEDICATIONS:  MEDICATIONS  (STANDING):  amLODIPine   Tablet 2.5 milliGRAM(s) Oral daily  aspirin  chewable 81 milliGRAM(s) Oral daily  atorvastatin 80 milliGRAM(s) Oral at bedtime  chlorhexidine 4% Liquid 1 Application(s) Topical <User Schedule>  enoxaparin Injectable 40 milliGRAM(s) SubCutaneous at bedtime  pantoprazole   Suspension 40 milliGRAM(s) Oral before breakfast    MEDICATIONS  (PRN):  acetaminophen   Tablet .. 650 milliGRAM(s) Oral every 6 hours PRN Moderate Pain (4 - 6)      REVIEW OF SYSTEMS:  CONSTITUTIONAL: [X] all negative; [ ] weakness, [ ] fevers, [ ] chills  EYES/ENT: [X] all negative; [ ] visual changes, [ ] vertigo, [ ] throat pain   NECK: [X] all negative; [ ] pain, [ ] stiffness  RESPIRATORY: [] all negative, [ ] cough, [ ] wheezing, [ ] hemoptysis, [ ] shortness of breath  CARDIOVASCULAR: [] all negative; [ ] chest pain, [ ] palpitations, [ ] orthopnea  GASTROINTESTINAL: [X] all negative; [ ]abdominal pain, [ ] nausea, [ ] vomiting, [ ] hematemesis, [ ] diarrhea, [ ] constipation, [ ] melena, [ ] hematochezia.  GENITOURINARY: [X] all negative; [ ] dysuria, [ ] frequency, [ ] hematuria  NEUROLOGICAL: [X] all negative; [ ] numbness, [ ] weakness  SKIN: [X] all negative; [ ] itching, [ ] burning, [ ] rashes, [ ] lesions   All other review of systems is negative unless indicated above.    [  ] Unable to assess ROS because     PHYSICAL EXAM:          CONSTITUTIONAL: Well-developed; well-nourished; in no acute distress.   	SKIN: warm, dry  	HEAD: Normocephalic; atraumatic.  	EYES: PERRL, EOM, no conj injection, sclera clear  	ENT: No nasal discharge; airway clear.  	NECK: Supple; non tender.  No midline ttp ctls  	CARD: S1, S2 normal; no murmurs, gallops, or rubs. Regular rate and rhythm. 2+ RPs and DPs bilat, no carotid bruits, no pedal   edema, no calf pain b/l  	RESP: CTA  bilat good air movement No wheezes, rales or rhonchi.  	ABD: Soft, not tender, not distended, no CVA ttp no rebound or guarding, bowel sounds present  	EXT: Normal ROM.  No clubbing, cyanosis or edema.   	  	NEURO: Alert, awake, motor 5/5 R, 5/5 L        RADIOLOGY:  xray  < from: CT Neck Soft Tissue w/ IV Cont (06.04.21 @ 02:36) >  IMPRESSION:  Redemonstration of right vocal cord paralysis without evidence of laryngeal mass lesion, other compressive lesionor lymphadenopathy.    There is ill-defined asymmetric soft tissue enhancement involving the right perimandibular/gingival soft tissues. Correlation with physical examination is recommended to exclude underlying mass lesion.    Redemonstration of high-grade stenosis versus occlusion of the distal right V4 segment of the vertebral artery.        < end of copied text >    I spent 45 minutes of critical care time examining patient, reviewing vitals, labs, medications, imaging and discussing with the team goals of care to prevent life-threatening in this patient who is at high risk for deterioration or death due to:

## 2021-06-05 NOTE — PHYSICAL THERAPY INITIAL EVALUATION ADULT - TRANSFER SAFETY CONCERNS NOTED: SIT/STAND, REHAB EVAL
Patient leaining to the right when standing/decreased balance during turns/losing balance/decreased proprioception/decreased sequencing ability/decreased weight-shifting ability

## 2021-06-05 NOTE — PHYSICAL THERAPY INITIAL EVALUATION ADULT - GENERAL OBSERVATIONS, REHAB EVAL
Patient encountered sitting in chair. Patient c/o headache and mild dizziness. Agreed to participate in therapy.

## 2021-06-05 NOTE — CONSULT NOTE ADULT - ASSESSMENT
IMPRESSION: Rehab of 55  y.o  m  rehab  fo  cva  rt  side  weakness     PRECAUTIONS: [  ] Cardiac  [  ] Respiratory  [  ] Seizures [  ] Contact Isolation  [  ] Droplet Isolation  [ FALL ] Other    Weight Bearing Status:     RECOMMENDATION:    Out of Bed to Chair     DVT/Decubiti Prophylaxis    REHAB PLAN:     [  x ] Bedside P/T 3-5 times a week   [  x ]   Bedside O/T  2-3 times a week             [   ] No Rehab Therapy Indicated                   [   ]  Speech Therapy   Conditioning/ROM                                    ADL  Bed Mobility                                               Conditioning/ROM  Transfers                                                     Bed Mobility  Sitting /Standing Balance                         Transfers                                        Gait Training                                               Sitting/Standing Balance  Stair Training [   ]Applicable                    Home equipment Eval                                                                        Splinting  [   ] Only      GOALS:   ADL   [ x  ]   Independent                    Transfers  [x   ] Independent                          Ambulation  [ x  ] Independent     [    ] With device                            [  x ]  CG                                                         [ x  ]  CG                                                                  [x   ] CG                            [    ] Min A                                                   [   ] Min A                                                              [   ] Min  A          DISCHARGE PLAN:   [  xx ]  Good candidate for Intensive Rehabilitation/Hospital based-4A [facility]                                             Will tolerate 3hrs Intensive Rehab Daily                                       [    ]  Short Term Rehab in Skilled Nursing Facility                                       [    ]  Home with Outpatient or VN services                                         [    ]  Possible Candidate for Intensive Hospital based Rehab

## 2021-06-05 NOTE — CONSULT NOTE ADULT - SUBJECTIVE AND OBJECTIVE BOX
HPI:  55 y.o. male with PMH of HLD on statin and HTN? ( non compliant with Anti HTN meds) who presents to the ED for stroke like symptoms.   Pt states he experienced since 1 am this morning-  HA, weakness, dizziness and R sided change in sensation of face and RUE. Upon EMS arrival, had difficulty ambulating. Denies taking any medications PTA. Pt admits to HA intermittently for past week, alleviated by NSAIDS. No head trauma, LOC seizure activity or ASA usage.  VS on admission stable. NIHSS on arrival 3, CODE stroke on admission.      CT Brain Stroke Protocol   No evidence of acute intracranial hemorrhage or large territory infarct. 1 cm dural based lesion of the left parietal convexity likely reflecting a meningioma.    Not a TpA candidate for low NIHSS and delayed presentation.     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 The PICA demonstrates diminished opacification. Corresponding ischemic penumbra within the right cerebellar hemisphere (Tmax >6s volume 15 cc, no abnormal CBF).      Slight irregularity of the lateral wall of the right cavernous ICA without discrete aneurysm.     Laryngeal findings suggestive of a right-sided vocal cord paralysis.    Pt seen by neurology. s/p 325 ASA, lipitor 80 and is being admitted for further management.   ptn  seen at  bed side  oob  to  chair  nad fu  command  fluent  speaking  english  no  fascial  drop  nocte     PTN  REFERRED TO ACUTE  REHAB  FOR  EVAL AND  TX   PAST MEDICAL & SURGICAL HISTORY:  Hyperlipidemia    Hypertension        Hospital Course:    TODAY'S SUBJECTIVE & REVIEW OF SYMPTOMS:     Constitutional WNL   Cardio WNL   Resp WNL   GI WNL  Heme WNL  Endo WNL  Skin WNL  MSK WNL  Neuro WNL  Cognitive WNL  Psych WNL      MEDICATIONS  (STANDING):  amLODIPine   Tablet 2.5 milliGRAM(s) Oral daily  aspirin  chewable 81 milliGRAM(s) Oral daily  atorvastatin 80 milliGRAM(s) Oral at bedtime  chlorhexidine 4% Liquid 1 Application(s) Topical <User Schedule>  enoxaparin Injectable 40 milliGRAM(s) SubCutaneous at bedtime  pantoprazole   Suspension 40 milliGRAM(s) Oral before breakfast    MEDICATIONS  (PRN):  acetaminophen   Tablet .. 650 milliGRAM(s) Oral every 6 hours PRN Moderate Pain (4 - 6)      FAMILY HISTORY:      Allergies    No Known Allergies    Intolerances        SOCIAL HISTORY:    [  ] Etoh  [  ] Smoking  [  ] Substance abuse     Home Environment:  [ x ] Home Alone  [  ] Lives with Family  [  ] Home Health Aid    Dwelling:  [x  ] Apartment  [  ] Private House  [  ] Adult Home  [  ] Skilled Nursing Facility      [  ] Short Term  [  ] Long Term  [ x ] Stairs       Elevator [  ]    FUNCTIONAL STATUS PTA: (Check all that apply)  Ambulation: [  x ]Independent    [  ] Dependent     [  ] Non-Ambulatory  Assistive Device: [  ] SA Cane  [  ]  Q Cane  [  ] Walker  [  ]  Wheelchair  ADL : [  x] Independent  [  ]  Dependent       Vital Signs Last 24 Hrs  T(C): 36.8 (05 Jun 2021 09:00), Max: 36.8 (05 Jun 2021 09:00)  T(F): 98.2 (05 Jun 2021 09:00), Max: 98.2 (05 Jun 2021 09:00)  HR: 86 (05 Jun 2021 09:00) (70 - 86)  BP: 154/98 (05 Jun 2021 09:00) (146/84 - 181/100)  BP(mean): 117 (05 Jun 2021 09:00) (105 - 131)  RR: 12 (05 Jun 2021 09:00) (10 - 25)  SpO2: 97% (05 Jun 2021 09:00) (97% - 99%)      PHYSICAL EXAM: Alert & Oriented X3  GENERAL: NAD, well-groomed, well-developed  HEAD:  Atraumatic, Normocephalic  EYES: EOMI, PERRLA, conjunctiva and sclera clear  NECK: Supple, No JVD, Normal thyroid  CHEST/LUNG: Clear to percussion bilaterally; No rales, rhonchi, wheezing, or rubs  HEART: Regular rate and rhythm; No murmurs, rubs, or gallops  ABDOMEN: Soft, Nontender, Nondistended; Bowel sounds present  EXTREMITIES:  2+ Peripheral Pulses, No clubbing, cyanosis, or edema    NERVOUS SYSTEM:  Cranial Nerves 2-12 intact [ x ] Abnormal  [  ]  ROM: WFL all extremities [x  ]  Abnormal [  ]  Motor Strength: WFL all extremities  [x  ]  Abnormal [  ]  Sensation: intact to light touch [x  ] Abnormal [  ]  Reflexes: Symmetric [ x ]  Abnormal [  ]    FUNCTIONAL STATUS:  Bed Mobility: Independent [  ]  Supervision [  ]  Needs Assistance [ x ]  N/A [  ]  Transfers: Independent [  ]  Supervision [  ]  Needs Assistance [  x]  N/A [  ]   Ambulation: Independent [  ]  Supervision [  ]  Needs Assistance [x  ]  N/A [  ]  ADL: Independent [  ] Requires Assistance [ x ] N/A [  ]  SEE PT/OT IE NOTES    LABS:                        13.3   5.44  )-----------( 262      ( 05 Jun 2021 04:38 )             39.5     06-05    138  |  100  |  22<H>  ----------------------------<  151<H>  4.0   |  27  |  1.0    Ca    9.8      05 Jun 2021 04:38  Mg     2.4     06-05    TPro  7.1  /  Alb  4.4  /  TBili  0.3  /  DBili  x   /  AST  48<H>  /  ALT  41  /  AlkPhos  102  06-05          RADIOLOGY & ADDITIONAL STUDIES:< from: CT Angio Neck w/ IV Cont (06.03.21 @ 08:09) >  IMPRESSION:    1.  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 The PICA demonstrates diminished opacification. Corresponding ischemic penumbra within the right cerebellar hemisphere (Tmax >6s volume 15 cc, no abnormal CBF).    2.  Slight irregularity of the lateral wall of the right cavernous ICA without discrete aneurysm.    3.  Laryngeal findings suggestive of a right-sided vocal cord paralysis.    < end of copied text >  < from: CT Brain Stroke Protocol (06.03.21 @ 07:58) >    1.  No evidence of acute intracranial hemorrhage or large territory infarct.    2.  1 cm dural based lesion of the left parietal convexity likely reflecting a meningioma.      < end of copied text >      Assesment:

## 2021-06-05 NOTE — PROGRESS NOTE ADULT - ASSESSMENT
IMPRESSION:    Acute CVA/ no tpa   VC paralysis/ sp ENT  Ho HTN ?compliance with meds  HO HLD    PLAN:    CNS: Avoid sedatives, Q1H neuro checks, Neuro FU. ASA + statin.     HEENT: Oral care    PULMONARY:  HOB @ 45 degrees.  Aspiration precautions.     CARDIOVASCULAR: Allow permissive HTN, Goal SBP <140 -180.     GI: GI prophylaxis.  Feeding per S&S (complaining of dysphagia). If needed then NG tube.   Bowel regimen     RENAL:  Follow up lytes.  Correct as needed    INFECTIOUS DISEASE: Follow up cultures.     HEMATOLOGICAL:  DVT prophylaxis.    ENDOCRINE:  Follow up FS.  Insulin protocol if needed.    MUSCULOSKELETAL: bedrest, PT/OT.    Dispo: stroke unit/  cleared by Neuro

## 2021-06-05 NOTE — PHYSICAL THERAPY INITIAL EVALUATION ADULT - IMPAIRMENTS CONTRIBUTING TO GAIT DEVIATIONS, PT EVAL
patient leaning to the right when ambulating/ataxic/impaired balance/impaired coordination/impaired motor control

## 2021-06-05 NOTE — DIETITIAN INITIAL EVALUATION ADULT. - OTHER INFO
Dx: 56y/o male with pmhx noted above presented with HA, weakness, dizziness and R sided change in sensation of face and RUE. Admitted with CVA, dysmetria and difficulty swallowing. S/p CTH (6/3), results does not show evidence of acute intracranial hemorrhage or large territory infarct but shows a 1cm dural based lesion of the left parietal convexity likely reflecting a meningioma. 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 The patient is not a TPA candidate due to being outside temporal window. S/p swallow evaluation (6/4), recs include keep the patient NPO and provide alternate means of nutrition and hydration. S/p NG Tube placement. MAP-91. Dx: 56y/o male with pmhx noted above presented with HA, weakness, dizziness and R sided change in sensation of face and RUE. Admitted with CVA, dysmetria and difficulty swallowing. S/p CTH (6/3), results does not show evidence of acute intracranial hemorrhage or large territory infarct but shows a 1cm dural based lesion of the left parietal convexity likely reflecting a meningioma. 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 The patient is not a TPA candidate due to being outside temporal window. S/p swallow evaluation (6/4), recs include keep the patient NPO and provide alternate means of nutrition and hydration. S/p NG Tube placement. MAP-91. Skin is intact (Jamal Score-17).

## 2021-06-05 NOTE — PHYSICAL THERAPY INITIAL EVALUATION ADULT - PERTINENT HX OF CURRENT PROBLEM, REHAB EVAL
Pt is a 55 y.o. male with PMH of HLD on statin and HTN? ( non compliant with Anti HTN meds) who presents to the ED for stroke like symptoms. Pt states he experienced since 1 am this morning-  HA, weakness, dizziness and R sided change in sensation of face and RUE. Upon EMS arrival, had difficulty ambulating. Denies taking any medications PTA.

## 2021-06-05 NOTE — DIETITIAN INITIAL EVALUATION ADULT. - ENTERAL
1.Increase the volume of TF with Jevity 1.2 to 350mL Q6hrs (1680kcal, 78gm protein, 1134mL free H2O)

## 2021-06-05 NOTE — DIETITIAN INITIAL EVALUATION ADULT. - OTHER CALCULATIONS
Estimated Calorie Needs: [id number] x AF 1.1-1.3=[id number]/day -Due to overweight status;  Estimated Protein Needs: 69-71grams/day (1-1.3grams/kg) -Due to CVA;  Estimated Fluid Needs: Fluids per ICU team

## 2021-06-05 NOTE — PHYSICAL THERAPY INITIAL EVALUATION ADULT - ADDITIONAL COMMENTS
Patient lives alone in home with 5 steps to enter and 1 FOC to bedroom. Patient independent in ADL's and ambulation without assistive device.

## 2021-06-05 NOTE — PHYSICAL THERAPY INITIAL EVALUATION ADULT - GAIT DEVIATIONS NOTED, PT EVAL
decreased trey/increased time in double stance/decreased step length/decreased stride length/increased stride width

## 2021-06-05 NOTE — DIETITIAN INITIAL EVALUATION ADULT. - FACTORS AFF FOOD INTAKE
(6/4) TF with Jevity 1.2 at 180mL Q6hrs via NG Tube -The patient denies N/V/D/C. Reports having a bowel movement at this time./difficulty swallowing

## 2021-06-05 NOTE — DIETITIAN INITIAL EVALUATION ADULT. - NAME AND PHONE
I spoke to the patient's physician name Dr. Nettles ([facility] #[id number]) regarding my nutritional recommendations.

## 2021-06-06 LAB
ALBUMIN SERPL ELPH-MCNC: 4.5 G/DL — SIGNIFICANT CHANGE UP (ref 3.5–5.2)
ALP SERPL-CCNC: 104 U/L — SIGNIFICANT CHANGE UP (ref 30–115)
ALT FLD-CCNC: 37 U/L — SIGNIFICANT CHANGE UP (ref 0–41)
ANION GAP SERPL CALC-SCNC: 12 MMOL/L — SIGNIFICANT CHANGE UP (ref 7–14)
AST SERPL-CCNC: 44 U/L — HIGH (ref 0–41)
BASOPHILS # BLD AUTO: 0.07 K/UL — SIGNIFICANT CHANGE UP (ref 0–0.2)
BASOPHILS NFR BLD AUTO: 1.1 % — HIGH (ref 0–1)
BILIRUB SERPL-MCNC: 0.4 MG/DL — SIGNIFICANT CHANGE UP (ref 0.2–1.2)
BUN SERPL-MCNC: 28 MG/DL — HIGH (ref 10–20)
CALCIUM SERPL-MCNC: 10.5 MG/DL — HIGH (ref 8.5–10.1)
CHLORIDE SERPL-SCNC: 101 MMOL/L — SIGNIFICANT CHANGE UP (ref 98–110)
CO2 SERPL-SCNC: 26 MMOL/L — SIGNIFICANT CHANGE UP (ref 17–32)
CREAT SERPL-MCNC: 0.9 MG/DL — SIGNIFICANT CHANGE UP (ref 0.7–1.5)
EOSINOPHIL # BLD AUTO: 0.05 K/UL — SIGNIFICANT CHANGE UP (ref 0–0.7)
EOSINOPHIL NFR BLD AUTO: 0.8 % — SIGNIFICANT CHANGE UP (ref 0–8)
GLUCOSE BLDC GLUCOMTR-MCNC: 104 MG/DL — HIGH (ref 70–99)
GLUCOSE BLDC GLUCOMTR-MCNC: 120 MG/DL — HIGH (ref 70–99)
GLUCOSE SERPL-MCNC: 105 MG/DL — HIGH (ref 70–99)
HCT VFR BLD CALC: 39.2 % — LOW (ref 42–52)
HGB BLD-MCNC: 13.1 G/DL — LOW (ref 14–18)
IMM GRANULOCYTES NFR BLD AUTO: 0.5 % — HIGH (ref 0.1–0.3)
LYMPHOCYTES # BLD AUTO: 1.77 K/UL — SIGNIFICANT CHANGE UP (ref 1.2–3.4)
LYMPHOCYTES # BLD AUTO: 28.3 % — SIGNIFICANT CHANGE UP (ref 20.5–51.1)
MCHC RBC-ENTMCNC: 28.7 PG — SIGNIFICANT CHANGE UP (ref 27–31)
MCHC RBC-ENTMCNC: 33.4 G/DL — SIGNIFICANT CHANGE UP (ref 32–37)
MCV RBC AUTO: 86 FL — SIGNIFICANT CHANGE UP (ref 80–94)
MONOCYTES # BLD AUTO: 0.59 K/UL — SIGNIFICANT CHANGE UP (ref 0.1–0.6)
MONOCYTES NFR BLD AUTO: 9.4 % — HIGH (ref 1.7–9.3)
NEUTROPHILS # BLD AUTO: 3.74 K/UL — SIGNIFICANT CHANGE UP (ref 1.4–6.5)
NEUTROPHILS NFR BLD AUTO: 59.9 % — SIGNIFICANT CHANGE UP (ref 42.2–75.2)
NRBC # BLD: 0 /100 WBCS — SIGNIFICANT CHANGE UP (ref 0–0)
PLATELET # BLD AUTO: 258 K/UL — SIGNIFICANT CHANGE UP (ref 130–400)
POTASSIUM SERPL-MCNC: 3.9 MMOL/L — SIGNIFICANT CHANGE UP (ref 3.5–5)
POTASSIUM SERPL-SCNC: 3.9 MMOL/L — SIGNIFICANT CHANGE UP (ref 3.5–5)
PROT SERPL-MCNC: 7.7 G/DL — SIGNIFICANT CHANGE UP (ref 6–8)
RBC # BLD: 4.56 M/UL — LOW (ref 4.7–6.1)
RBC # FLD: 14.2 % — SIGNIFICANT CHANGE UP (ref 11.5–14.5)
SODIUM SERPL-SCNC: 139 MMOL/L — SIGNIFICANT CHANGE UP (ref 135–146)
WBC # BLD: 6.25 K/UL — SIGNIFICANT CHANGE UP (ref 4.8–10.8)
WBC # FLD AUTO: 6.25 K/UL — SIGNIFICANT CHANGE UP (ref 4.8–10.8)

## 2021-06-06 PROCEDURE — 99232 SBSQ HOSP IP/OBS MODERATE 35: CPT

## 2021-06-06 RX ADMIN — AMLODIPINE BESYLATE 2.5 MILLIGRAM(S): 2.5 TABLET ORAL at 05:43

## 2021-06-06 RX ADMIN — Medication 650 MILLIGRAM(S): at 09:49

## 2021-06-06 RX ADMIN — ATORVASTATIN CALCIUM 80 MILLIGRAM(S): 80 TABLET, FILM COATED ORAL at 21:01

## 2021-06-06 RX ADMIN — Medication 650 MILLIGRAM(S): at 08:40

## 2021-06-06 RX ADMIN — PANTOPRAZOLE SODIUM 40 MILLIGRAM(S): 20 TABLET, DELAYED RELEASE ORAL at 05:43

## 2021-06-06 RX ADMIN — Medication 650 MILLIGRAM(S): at 23:27

## 2021-06-06 RX ADMIN — Medication 650 MILLIGRAM(S): at 17:14

## 2021-06-06 RX ADMIN — Medication 81 MILLIGRAM(S): at 11:37

## 2021-06-06 RX ADMIN — ENOXAPARIN SODIUM 40 MILLIGRAM(S): 100 INJECTION SUBCUTANEOUS at 21:02

## 2021-06-06 NOTE — OCCUPATIONAL THERAPY INITIAL EVALUATION ADULT - VISUAL ASSESSMENT: SCANNING
Pt aware of deficits to right side. Able to scan but c/o increase blurriness and discomfort of the head on the right./mild impairment

## 2021-06-06 NOTE — OCCUPATIONAL THERAPY INITIAL EVALUATION ADULT - GENERAL OBSERVATIONS, REHAB EVAL
13:50 - 14:22. Pt encountered seated in recliner. Pt c/o headache and mild blurriness of vision on right side.

## 2021-06-06 NOTE — OCCUPATIONAL THERAPY INITIAL EVALUATION ADULT - PLANNED THERAPY INTERVENTIONS, OT EVAL
ADL retraining/balance training/bed mobility training/cognitive, visual perceptual/motor coordination training/neuromuscular re-education/parent/caregiver training.../strengthening/transfer training

## 2021-06-06 NOTE — OCCUPATIONAL THERAPY INITIAL EVALUATION ADULT - VISUAL ASSESSMENT: TRACKING
Jerkiness noted with smooth pursuits to the right with right eye. Loses gaze stability with right eye. Pt c/o discomfort in head and eyes with end range visual tracking  to the right.

## 2021-06-06 NOTE — OCCUPATIONAL THERAPY INITIAL EVALUATION ADULT - RANGE OF MOTION EXAMINATION, UPPER EXTREMITY
RUE gross coordination and control is mildly-moderately impaired/Left UE Active ROM was WFL (within functional limits)/Right UE Active ROM was WFL (within functional limits)

## 2021-06-06 NOTE — PROGRESS NOTE ADULT - SUBJECTIVE AND OBJECTIVE BOX
BRIGETTE CARRILLO  55y, Male  Allergy: No Known Allergies      OVERNIGHT EVENTS:  No acute events.  Pt transferred from critical care to neuro floor last PM.]  Pt without c/o but is asking whether he can take in orals.    PAST MEDICAL & SURGICAL HISTORY:  Hyperlipidemia  Hypertension    VITALS:  T(F): 99.6 (06-06-21 @ 13:00), Max: 99.6 (06-06-21 @ 13:00)  HR: 89 (06-06-21 @ 13:00)  BP: 143/94 (06-06-21 @ 13:00) (140/103 - 170/100)  RR: 18 (06-06-21 @ 13:00)  SpO2: 98% (06-05-21 @ 23:02)    TESTS & MEASUREMENTS:  Height (cm): 157.5 (06-05-21 @ 18:46)    06-04-21 @ 07:01  -  06-05-21 @ 07:00  --------------------------------------------------------  IN: 920 mL / OUT: 800 mL / NET: 120 mL    06-05-21 @ 07:01  -  06-06-21 @ 07:00  --------------------------------------------------------  IN: 1040 mL / OUT: 700 mL / NET: 340 mL      PE:  Alert, oriented and responding to questions appropriately.  ?Slurring of speech - unclear as he is not a native English speaker - will clarify baseline.  + NGT in place.  Mild facial asymmetry?  Tongue is midline with movement side to side being normal.  Neck supple.  No JVD.  Unable to appreciate bruits.  Chest CTA b/l. No W/R/R.  Cor Reg S1S2, no murmur noted.  Abd not distended, + BS, soft and NT.  No bruit or mass.  Extremities no swelling , NT.  Able to move all 4 extremities with ?RUE and LUE weakness noted.                        13.1   6.25  )-----------( 258      ( 06 Jun 2021 05:59 )             39.2       06-06    139  |  101  |  28<H>  ----------------------------<  105<H>  3.9   |  26  |  0.9    Ca    10.5<H>      06 Jun 2021 05:59  Mg     2.4     06-05    TPro  7.7  /  Alb  4.5  /  TBili  0.4  /  DBili  x   /  AST  44<H>  /  ALT  37  /  AlkPhos  104  06-06    LIVER FUNCTIONS - ( 06 Jun 2021 05:59 )  Alb: 4.5 g/dL / Pro: 7.7 g/dL / ALK PHOS: 104 U/L / ALT: 37 U/L / AST: 44 U/L / GGT: x           Head and Neck CT/ CTA/ MRI Brain:  no acute ICH or large territory infarct, 1 cm ?meningoma noted, R vocal cord paralysis with ill defined asymmetric soft tissue enhancement R perimandibular / gingival soft tissue - to correlate clinically.  + High grade stenosis vs. occlusion of distal Right V4 segment of vertebral artery.    Echo:  nl LV systolic function with EF 68%, large patent foramen ovale with L to R shunting.    MEDICATIONS:  MEDICATIONS  (STANDING):  amLODIPine   Tablet 2.5 milliGRAM(s) Oral daily  aspirin  chewable 81 milliGRAM(s) Oral daily  atorvastatin 80 milliGRAM(s) Oral at bedtime  chlorhexidine 4% Liquid 1 Application(s) Topical <User Schedule>  enoxaparin Injectable 40 milliGRAM(s) SubCutaneous at bedtime  pantoprazole   Suspension 40 milliGRAM(s) Oral before breakfast    MEDICATIONS  (PRN):  acetaminophen   Tablet .. 650 milliGRAM(s) Oral every 6 hours PRN Moderate Pain (4 - 6)

## 2021-06-06 NOTE — OCCUPATIONAL THERAPY INITIAL EVALUATION ADULT - ADL RETRAINING, OT EVAL
Improve UB dressing to CGA seated EOB without loss of balance or leaning too much to the right side.

## 2021-06-07 LAB
ALBUMIN SERPL ELPH-MCNC: 4.4 G/DL — SIGNIFICANT CHANGE UP (ref 3.5–5.2)
ALP SERPL-CCNC: 98 U/L — SIGNIFICANT CHANGE UP (ref 30–115)
ALT FLD-CCNC: 39 U/L — SIGNIFICANT CHANGE UP (ref 0–41)
ANION GAP SERPL CALC-SCNC: 14 MMOL/L — SIGNIFICANT CHANGE UP (ref 7–14)
AST SERPL-CCNC: 51 U/L — HIGH (ref 0–41)
BILIRUB SERPL-MCNC: 0.5 MG/DL — SIGNIFICANT CHANGE UP (ref 0.2–1.2)
BUN SERPL-MCNC: 26 MG/DL — HIGH (ref 10–20)
CALCIUM SERPL-MCNC: 10.2 MG/DL — HIGH (ref 8.5–10.1)
CHLORIDE SERPL-SCNC: 101 MMOL/L — SIGNIFICANT CHANGE UP (ref 98–110)
CO2 SERPL-SCNC: 24 MMOL/L — SIGNIFICANT CHANGE UP (ref 17–32)
CREAT SERPL-MCNC: 1 MG/DL — SIGNIFICANT CHANGE UP (ref 0.7–1.5)
GLUCOSE SERPL-MCNC: 91 MG/DL — SIGNIFICANT CHANGE UP (ref 70–99)
HCT VFR BLD CALC: 38.7 % — LOW (ref 42–52)
HGB BLD-MCNC: 13 G/DL — LOW (ref 14–18)
MCHC RBC-ENTMCNC: 29 PG — SIGNIFICANT CHANGE UP (ref 27–31)
MCHC RBC-ENTMCNC: 33.6 G/DL — SIGNIFICANT CHANGE UP (ref 32–37)
MCV RBC AUTO: 86.4 FL — SIGNIFICANT CHANGE UP (ref 80–94)
NRBC # BLD: 0 /100 WBCS — SIGNIFICANT CHANGE UP (ref 0–0)
PLATELET # BLD AUTO: 221 K/UL — SIGNIFICANT CHANGE UP (ref 130–400)
POTASSIUM SERPL-MCNC: 4.1 MMOL/L — SIGNIFICANT CHANGE UP (ref 3.5–5)
POTASSIUM SERPL-SCNC: 4.1 MMOL/L — SIGNIFICANT CHANGE UP (ref 3.5–5)
PROT SERPL-MCNC: 7.3 G/DL — SIGNIFICANT CHANGE UP (ref 6–8)
RBC # BLD: 4.48 M/UL — LOW (ref 4.7–6.1)
RBC # FLD: 13.9 % — SIGNIFICANT CHANGE UP (ref 11.5–14.5)
SARS-COV-2 RNA SPEC QL NAA+PROBE: SIGNIFICANT CHANGE UP
SODIUM SERPL-SCNC: 139 MMOL/L — SIGNIFICANT CHANGE UP (ref 135–146)
WBC # BLD: 5.01 K/UL — SIGNIFICANT CHANGE UP (ref 4.8–10.8)
WBC # FLD AUTO: 5.01 K/UL — SIGNIFICANT CHANGE UP (ref 4.8–10.8)

## 2021-06-07 PROCEDURE — 99232 SBSQ HOSP IP/OBS MODERATE 35: CPT

## 2021-06-07 PROCEDURE — 93970 EXTREMITY STUDY: CPT | Mod: 26

## 2021-06-07 RX ORDER — ACETAMINOPHEN 500 MG
650 TABLET ORAL ONCE
Refills: 0 | Status: COMPLETED | OUTPATIENT
Start: 2021-06-07 | End: 2021-06-07

## 2021-06-07 RX ADMIN — AMLODIPINE BESYLATE 2.5 MILLIGRAM(S): 2.5 TABLET ORAL at 05:00

## 2021-06-07 RX ADMIN — ENOXAPARIN SODIUM 40 MILLIGRAM(S): 100 INJECTION SUBCUTANEOUS at 21:13

## 2021-06-07 RX ADMIN — Medication 650 MILLIGRAM(S): at 10:02

## 2021-06-07 RX ADMIN — CHLORHEXIDINE GLUCONATE 1 APPLICATION(S): 213 SOLUTION TOPICAL at 05:00

## 2021-06-07 RX ADMIN — Medication 650 MILLIGRAM(S): at 05:01

## 2021-06-07 RX ADMIN — Medication 650 MILLIGRAM(S): at 17:32

## 2021-06-07 RX ADMIN — ATORVASTATIN CALCIUM 80 MILLIGRAM(S): 80 TABLET, FILM COATED ORAL at 21:13

## 2021-06-07 RX ADMIN — PANTOPRAZOLE SODIUM 40 MILLIGRAM(S): 20 TABLET, DELAYED RELEASE ORAL at 05:01

## 2021-06-07 RX ADMIN — Medication 81 MILLIGRAM(S): at 11:42

## 2021-06-07 RX ADMIN — Medication 650 MILLIGRAM(S): at 18:16

## 2021-06-07 RX ADMIN — Medication 650 MILLIGRAM(S): at 02:03

## 2021-06-07 RX ADMIN — Medication 650 MILLIGRAM(S): at 10:56

## 2021-06-07 NOTE — PROGRESS NOTE ADULT - ATTENDING COMMENTS
patient seen and examined earlier today with resident.  patient offers no complaints of weakness, dizziness.  says he is hungry and wants to eat.  Vital Signs Last 24 Hrs  T(C): 36.9 (07 Jun 2021 13:16), Max: 37.2 (07 Jun 2021 05:07)  T(F): 98.4 (07 Jun 2021 13:16), Max: 98.9 (07 Jun 2021 05:07)  HR: 92 (07 Jun 2021 13:16) (86 - 92)  BP: 147/89 (07 Jun 2021 13:16) (135/87 - 156/82)  BP(mean): 105 (06 Jun 2021 20:45) (105 - 105)  RR: 17 (07 Jun 2021 13:16) (17 - 18)   conj pink, no jaundice  neck no bruits. supple no JVD  lungs clear to auscultation.  good air entry bilaterally  heart regular rhythm.  no murmur heard  abd. soft nontender. no hepatomegaly. BS pos.  extremities no edema noted.  good skin turgor.    neuro - awake/alert/oriented x 3  CN - has decreased sensation right side distribution facial nerve  strength 4/5 RUE 5/5 LUE, 4/5 RLE 5/5 LLE                       13.0   5.01  )-----------( 221      ( 07 Jun 2021 07:15 )             38.7   06-07    139  |  101  |  26<H>  ----------------------------<  91  4.1   |  24  |  1.0    Ca    10.2<H>      07 Jun 2021 07:15    TPro  7.3  /  Alb  4.4  /  TBili  0.5  /  DBili  x   /  AST  51<H>  /  ALT  39  /  AlkPhos  98  06-07    A:  Acute lacunar infarct  vocal cord paralysis - ?related to CVA  PFO    P:  patient currently on aspirin  ask cardiology re - ?closure PFO vs. anticoagulation  speech and swallow to evalutae to start feeds  PT

## 2021-06-07 NOTE — SWALLOW BEDSIDE ASSESSMENT ADULT - SLP PERTINENT HISTORY OF CURRENT PROBLEM
Pt is a 56 y/o M w/ PMHx: HLD and HTN? (non-compliant w/ anti-HTN meds) who presents to the ED for stroke-like symptoms. NIHSS 3 on arrival, +stroke code. Not a TpA candidate for low NIHSS and delayed presentation. CT Brain (-) for acute infarct; 1cm dural based lesion of the left parietal convexity likely reflecting a meningioma. CT perfusion showed occlusion of the proximal V4 segment of the right vertebral artery just after the PICA origin. Corresponding ischemic penumbra within the right cerebellar hemisphere. Laryngeal findings suggestive of a right-sided vocal cord paralysis. pt seen by ENT, bedside laryngoscopy performed-> R false and TVC immobile. MRI (-); CT neck-> no evidence of mass lesion, other compressive lesion or lymphadenopathy. Ill-defined asymmetric soft tissue enhancement involving the right perimandibular/gingival soft tissues.
Pt is a 54 y/o M w/ PMHx: HLD and HTN? (non-compliant w/ anti-HTN meds) who presents to the ED for stroke-like symptoms. NIHSS 3 on arrival, +stroke code. Not a TpA candidate for low NIHSS and delayed presentation. CT Brain (-) for acute infarct; 1cm dural based lesion of the left parietal convexity likely reflecting a meningioma. CT perfusion showed occlusion of the proximal V4 segment of the right vertebral artery just after the PICA origin. Corresponding ischemic penumbra within the right cerebellar hemisphere. Laryngeal findings suggestive of a right-sided vocal cord paralysis. MRI pending. SLP c/s 2' stroke.

## 2021-06-07 NOTE — PROGRESS NOTE ADULT - ASSESSMENT
55M w/ PMHx of HLD on statin and HTN? ( non compliant with Anti HTN meds) who presents to the ED for stroke like symptoms.     #   - NIHSS on arrival 3, Not a TpA candidate.   - CT Brain Stroke Protocol:  No evidence of acute intracranial hemorrhage or large territory infarct. 1 cm dural based lesion of the left parietal convexity likely reflecting a meningioma.  - CT perfusion showed  *Occlusion of the proximal V4 segment of the right vertebral artery just after the PICA origin. The PICA demonstrates diminished opacification. Corresponding ischemic penumbra within the right cerebellar hemisphere.  - MR brain: Mild chronic microvascular changes    Pt seen by neurology. Clinically, acute lacunar stroke (ataxic HP) MR -ve. ASA and Statins for secondary stroke prophylaxis. Further work up at regular floor. PT/OT and discharge planning.     Laryngeal findings suggestive of a right-sided vocal cord paralysis. 55M w/ PMHx of HLD on statin and HTN? ( non compliant with Anti HTN meds) who presents to the ED for stroke like symptoms.     # Acute ischemic stroke  - NIHSS on arrival 3, Not a TpA candidate.   - CT Brain Stroke Protocol:  No evidence of acute intracranial hemorrhage or large territory infarct. 1 cm dural based lesion of the left parietal convexity likely reflecting a meningioma.  - CT perfusion showed  *Occlusion of the proximal V4 segment of the right vertebral artery just after the PICA origin. The PICA demonstrates diminished opacification. Corresponding ischemic penumbra within the right cerebellar hemisphere.  - MR brain: Mild chronic microvascular changes  - c/w Asa, statin  - Echo: EF 68%, Large PFO w/ Lt to Rt shunt  - f/u [facility] duplex    # Dysphagia  - ENT Eval: Laryngeal right-sided vocal cord paralysis  - s/p NGT  - barium swallow study ordered     # HTN -  c/w amlodipine    # DVT ppx - Lovenox  # GI ppx - PPI   # Diet - NGT feed   # COVID test - Negative  Full code.

## 2021-06-07 NOTE — SWALLOW BEDSIDE ASSESSMENT ADULT - SWALLOW EVAL: DIAGNOSIS
+immediate overt s/s aspiration/penetration for small amount of thin liquids; pt w/ vomiting post-PO trials. CHRISTOPHER Rodriguez made aware. Trials discontinued.
+min overt s/s of penetration/aspiration w/ thin liquids/ice chips characterized by throat clearing and a wet vocal quality after po trials
IV discontinued, cath removed intact

## 2021-06-07 NOTE — PROGRESS NOTE ADULT - SUBJECTIVE AND OBJECTIVE BOX
Hospital Day:  4d    Subjective: Patient is a 55y old  Male who presents with a chief complaint of CVA (06 Jun 2021 17:43)    Pt seen and evaluated at bedside.   Complaints: none   Over the night Events: none    Past Medical Hx:   No pertinent past medical history    Hyperlipidemia    Hypertension      Past Sx:    Allergies:  No Known Allergies    Current Meds:   Standng Meds:  amLODIPine   Tablet 2.5 milliGRAM(s) Oral daily  aspirin  chewable 81 milliGRAM(s) Oral daily  atorvastatin 80 milliGRAM(s) Oral at bedtime  chlorhexidine 4% Liquid 1 Application(s) Topical <User Schedule>  enoxaparin Injectable 40 milliGRAM(s) SubCutaneous at bedtime  pantoprazole   Suspension 40 milliGRAM(s) Oral before breakfast    PRN Meds:  acetaminophen   Tablet .. 650 milliGRAM(s) Oral every 6 hours PRN Moderate Pain (4 - 6)      Vital Signs:   T(F): 98.4 (06-07-21 @ 13:16), Max: 98.9 (06-07-21 @ 05:07)  HR: 92 (06-07-21 @ 13:16) (86 - 92)  BP: 147/89 (06-07-21 @ 13:16) (135/87 - 156/82)  RR: 17 (06-07-21 @ 13:16) (17 - 18)  SpO2: --    Physical Exam:   GENERAL: NAD, Resting in bed  HEENT: NCAT, has NGT  CHEST/LUNG: Clear to auscultation bilaterally; No wheezing or rubs.   HEART: Regular rate and rhythm; No murmurs, rubs, or gallops  ABDOMEN: Bowel sounds present; Soft, Nontender, Nondistended.   EXTREMITIES:  No clubbing, cyanosis, or edema  NERVOUS SYSTEM:  Alert & Oriented X3    FLUID BALANCE    06-05-21 @ 07:01  -  06-06-21 @ 07:00  --------------------------------------------------------  IN: 1040 mL / OUT: 700 mL / NET: 340 mL    06-06-21 @ 07:01  -  06-07-21 @ 07:00  --------------------------------------------------------  IN: 900 mL / OUT: 400 mL / NET: 500 mL      Labs:                         13.0   5.01  )-----------( 221      ( 07 Jun 2021 07:15 )             38.7       07 Jun 2021 07:15    139    |  101    |  26     ----------------------------<  91     4.1     |  24     |  1.0      Ca    10.2       07 Jun 2021 07:15    TPro  7.3    /  Alb  4.4    /  TBili  0.5    /  DBili  x      /  AST  51     /  ALT  39     /  AlkPhos  98     07 Jun 2021 07:15

## 2021-06-07 NOTE — SWALLOW BEDSIDE ASSESSMENT ADULT - SLP GENERAL OBSERVATIONS
Pt received in bed awake and alert, feels like he is improving and is requesting po
pt received on ED stretcher awake alert w/o c/o pain. +room air +hoarse, dysphonic vocal quality; +pt expectorating saliva

## 2021-06-07 NOTE — SWALLOW BEDSIDE ASSESSMENT ADULT - ASR SWALLOW RECOMMEND DIAG
planned for tomorrow 6/8/VFSS/MBS
2' documented R-vocal cord paralysis on CT perfusion study and sx of dysphagia./FEES

## 2021-06-07 NOTE — SWALLOW BEDSIDE ASSESSMENT ADULT - PHARYNGEAL PHASE
Wet vocal quality post oral intake/Throat clear post oral intake
pt w/ regurgitation of water followed by vomiting/Wet vocal quality post oral intake/Cough post oral intake

## 2021-06-07 NOTE — SWALLOW BEDSIDE ASSESSMENT ADULT - NS SPL SWALLOW CLINIC TRIAL FT
PO trials discontinued.
+toleration of ice, +min overt s/s of penetration/aspiration w/ sips of water by cup

## 2021-06-08 ENCOUNTER — TRANSCRIPTION ENCOUNTER (OUTPATIENT)
Age: 56
End: 2021-06-08

## 2021-06-08 ENCOUNTER — INPATIENT (INPATIENT)
Facility: HOSPITAL | Age: 56
LOS: 20 days | Discharge: HOME HEALTH | End: 2021-06-29
Attending: PHYSICAL MEDICINE & REHABILITATION | Admitting: PHYSICAL MEDICINE & REHABILITATION
Payer: COMMERCIAL

## 2021-06-08 VITALS — TEMPERATURE: 100 F

## 2021-06-08 VITALS
DIASTOLIC BLOOD PRESSURE: 83 MMHG | SYSTOLIC BLOOD PRESSURE: 130 MMHG | RESPIRATION RATE: 18 BRPM | TEMPERATURE: 100 F | HEART RATE: 99 BPM

## 2021-06-08 PROBLEM — I10 ESSENTIAL (PRIMARY) HYPERTENSION: Chronic | Status: ACTIVE | Noted: 2021-06-03

## 2021-06-08 PROBLEM — E78.5 HYPERLIPIDEMIA, UNSPECIFIED: Chronic | Status: ACTIVE | Noted: 2021-06-03

## 2021-06-08 LAB
HBV CORE AB SER-ACNC: REACTIVE
HBV SURFACE AB SER-ACNC: REACTIVE
HBV SURFACE AG SER-ACNC: SIGNIFICANT CHANGE UP
HCV RNA FLD QL NAA+PROBE: SIGNIFICANT CHANGE UP
T PALLIDUM AB TITR SER: NEGATIVE — SIGNIFICANT CHANGE UP

## 2021-06-08 PROCEDURE — 99238 HOSP IP/OBS DSCHRG MGMT 30/<: CPT

## 2021-06-08 PROCEDURE — 93010 ELECTROCARDIOGRAM REPORT: CPT

## 2021-06-08 PROCEDURE — 74230 X-RAY XM SWLNG FUNCJ C+: CPT | Mod: 26

## 2021-06-08 PROCEDURE — 99232 SBSQ HOSP IP/OBS MODERATE 35: CPT

## 2021-06-08 RX ORDER — AMLODIPINE BESYLATE 2.5 MG/1
2.5 TABLET ORAL DAILY
Refills: 0 | Status: DISCONTINUED | OUTPATIENT
Start: 2021-06-08 | End: 2021-06-22

## 2021-06-08 RX ORDER — ASPIRIN/CALCIUM CARB/MAGNESIUM 324 MG
81 TABLET ORAL DAILY
Refills: 0 | Status: DISCONTINUED | OUTPATIENT
Start: 2021-06-08 | End: 2021-06-29

## 2021-06-08 RX ORDER — AMLODIPINE BESYLATE 2.5 MG/1
1 TABLET ORAL
Qty: 0 | Refills: 0 | DISCHARGE
Start: 2021-06-08

## 2021-06-08 RX ORDER — ATORVASTATIN CALCIUM 80 MG/1
80 TABLET, FILM COATED ORAL AT BEDTIME
Refills: 0 | Status: DISCONTINUED | OUTPATIENT
Start: 2021-06-08 | End: 2021-06-29

## 2021-06-08 RX ORDER — PANTOPRAZOLE SODIUM 20 MG/1
40 TABLET, DELAYED RELEASE ORAL
Refills: 0 | Status: DISCONTINUED | OUTPATIENT
Start: 2021-06-08 | End: 2021-06-29

## 2021-06-08 RX ORDER — ROSUVASTATIN CALCIUM 5 MG/1
1 TABLET ORAL
Qty: 0 | Refills: 0 | DISCHARGE

## 2021-06-08 RX ORDER — ENOXAPARIN SODIUM 100 MG/ML
0 INJECTION SUBCUTANEOUS
Qty: 0 | Refills: 0 | DISCHARGE
Start: 2021-06-08

## 2021-06-08 RX ORDER — ENOXAPARIN SODIUM 100 MG/ML
40 INJECTION SUBCUTANEOUS AT BEDTIME
Refills: 0 | Status: DISCONTINUED | OUTPATIENT
Start: 2021-06-08 | End: 2021-06-29

## 2021-06-08 RX ORDER — ATORVASTATIN CALCIUM 80 MG/1
1 TABLET, FILM COATED ORAL
Qty: 0 | Refills: 0 | DISCHARGE
Start: 2021-06-08

## 2021-06-08 RX ORDER — ASPIRIN/CALCIUM CARB/MAGNESIUM 324 MG
1 TABLET ORAL
Qty: 0 | Refills: 0 | DISCHARGE
Start: 2021-06-08

## 2021-06-08 RX ORDER — ACETAMINOPHEN 500 MG
650 TABLET ORAL EVERY 6 HOURS
Refills: 0 | Status: DISCONTINUED | OUTPATIENT
Start: 2021-06-08 | End: 2021-06-09

## 2021-06-08 RX ORDER — PANTOPRAZOLE SODIUM 20 MG/1
40 TABLET, DELAYED RELEASE ORAL
Refills: 0 | Status: DISCONTINUED | OUTPATIENT
Start: 2021-06-08 | End: 2021-06-08

## 2021-06-08 RX ADMIN — Medication 81 MILLIGRAM(S): at 11:36

## 2021-06-08 RX ADMIN — Medication 650 MILLIGRAM(S): at 20:11

## 2021-06-08 RX ADMIN — PANTOPRAZOLE SODIUM 40 MILLIGRAM(S): 20 TABLET, DELAYED RELEASE ORAL at 05:09

## 2021-06-08 RX ADMIN — Medication 650 MILLIGRAM(S): at 13:10

## 2021-06-08 RX ADMIN — AMLODIPINE BESYLATE 2.5 MILLIGRAM(S): 2.5 TABLET ORAL at 05:09

## 2021-06-08 RX ADMIN — ATORVASTATIN CALCIUM 80 MILLIGRAM(S): 80 TABLET, FILM COATED ORAL at 21:20

## 2021-06-08 RX ADMIN — ENOXAPARIN SODIUM 40 MILLIGRAM(S): 100 INJECTION SUBCUTANEOUS at 21:20

## 2021-06-08 RX ADMIN — Medication 650 MILLIGRAM(S): at 05:16

## 2021-06-08 RX ADMIN — Medication 650 MILLIGRAM(S): at 21:17

## 2021-06-08 RX ADMIN — CHLORHEXIDINE GLUCONATE 1 APPLICATION(S): 213 SOLUTION TOPICAL at 05:09

## 2021-06-08 RX ADMIN — Medication 650 MILLIGRAM(S): at 05:13

## 2021-06-08 NOTE — SWALLOW VFSS/MBS ASSESSMENT ADULT - RESIDUE IN PYRIFORM SINUSES
cleared w/ dry swallows/Mild cleared w/ multiple dry swallows/Mild/Moderate cleared w/ consecutive swallow/Mild/Moderate

## 2021-06-08 NOTE — SWALLOW VFSS/MBS ASSESSMENT ADULT - PHARYNGEAL PHASE COMMENTS
Mild pharyngeal dysphagia for thin liquids Mild pharyngeal residue w/ nectar thick liquids Moderate pharyngeal dysphagia for puree and solids, pharyngeal residue consistently clears w/ consecutive swallows

## 2021-06-08 NOTE — PROGRESS NOTE ADULT - ASSESSMENT
55M w/ PMHx of HLD on statin and HTN? ( non compliant with Anti HTN meds) who presents to the ED for stroke like symptoms.     # Acute ischemic stroke  - NIHSS on arrival 3, Not a TpA candidate.   - CT Brain Stroke Protocol:  No evidence of acute intracranial hemorrhage or large territory infarct. 1 cm dural based lesion of the left parietal convexity likely reflecting a meningioma.  - CT perfusion showed  *Occlusion of the proximal V4 segment of the right vertebral artery just after the PICA origin. The PICA demonstrates diminished opacification. Corresponding ischemic penumbra within the right cerebellar hemisphere.  - MR brain: Mild chronic microvascular changes  - c/w Asa, statin  - Echo: EF 68%, Large PFO w/ Lt to Rt shunt  -  VA duplex - negative     # Dysphagia  - ENT Eval: Laryngeal right-sided vocal cord paralysis  - barium swallow study - passed  - started on DASH    # HTN -  c/w amlodipine    # DVT ppx - Lovenox  # GI ppx - PPI   # Diet - DASH  # COVID test - Negative  Full code.     Pending 4A auth

## 2021-06-08 NOTE — H&P ADULT - NSHPLABSRESULTS_GEN_ALL_CORE
Labs:             13.0   5.01  )-----------( 221      ( 07 Jun 2021 07:15 )             38.7     06-07    139  |  101  |  26<H>  ----------------------------<  91  4.1   |  24  |  1.0    Ca    10.2<H>      07 Jun 2021 07:15    TPro  7.3  /  Alb  4.4  /  TBili  0.5  /  DBili  x   /  AST  51<H>  /  ALT  39  /  AlkPhos  98  06-07    POCT Blood Glucose.: 120 mg/dL (06-06-21 @ 21:50)  POCT Blood Glucose.: 104 mg/dL (06-06-21 @ 17:13)    Imaging:  CT Head without contrast: No evidence of acute intracranial hemorrhage or large territory infarct. 1 cm dural-based lesion of the Left parietal convexity likely reflecting a meningioma.  CT Angio Head and Neck: Occlusion of the proximal V4 segment of the Right vertebral artery just after the PICA origin. The PICA demonstrates diminished opacification. Corresponding ischemic penumbra within the right cerebellar hemisphere (Tmax >6s volume 15 cc, no abnormal CBF). Slight irregularity of the lateral wall of the Right cavernous ICA without discrete aneurysm. Laryngeal findings suggestive of a Right-sided vocal cord paralysis.  CXR: No radiographic evidence of acute cardiopulmonary disease.  MRI Brain:  No evidence of recent infarct or acute intracranial hemorrhage. Mild chronic microvascular changes.  CT Neck Soft Tissue: Redemonstration of Right vocal cord paralysis without evidence of laryngeal mass lesion, other compressive lesion or lymphadenopathy. There is ill-defined asymmetric soft tissue enhancement involving the right perimandibular/gingival soft tissues. Correlation with physical examination is recommended to exclude underlying mass lesion. Redemonstration of high-grade stenosis versus occlusion of the distal right V4 segment of the vertebral artery.  Bilateral LE venous duplex: No evidence of deep venous thrombosis in the bilateral lower extremities.  Transthoracic echocardiogram: Normal global Left ventricular systolic function. LVEF 68%. Mildly increased LV wall thickness. Large patent foramen ovale, with predominantly Left-to-Right shunting across atrial septum. Intra-atrial septal aneurysm. Mild tricuspid regurgitation.

## 2021-06-08 NOTE — H&P ADULT - ATTENDING COMMENTS
I reviewed the chart and examined the patient with the resident and we discussed the findings and treatment plan. I agree with the findings and treatment plan above, which I modified as indicated. The patient requires 3 hrs a day of acute inpatient rehab: PT, OT, ST, Neuropsych.    Acute onset right sided weakness and gait impairment with right facial numbness, right VC paralysis, cerebellar penumbra on CTA but MRI negative. Diagnosed stroke by Neuro and has ongoing symptoms. Initial dysphagia has resolved. PFO to be addressed as an outpatient.   Requires mod assistance for most mobility. Good acute rehab candidate.   Monitor labs and vitals on regular diet.

## 2021-06-08 NOTE — H&P ADULT - ASSESSMENT
ASSESSMENT/PLAN    Rehab of clinically diagnosed CVA, not seen on MRI, with unsteady gait, Right-sided facial numbness, and dysphagia. NIHSS 3. mRS 4.  - Patient requires 3 hours/day of interdisciplinary acute inpatient rehabilitation  - CTA Head & Neck showed occlusion of the proximal V4 segment of the Right vertebral artery  - MRI Brain showed no evidence of recent infarct or acute intracranial hemorrhage.  - Large patent foramen ovale seen on transthoracic echocardiogram  - HbA1C 5.8%  - Hepatitis C Ab positive  - Hepatitis B Surface Ab and Core Ab positive, Hepatitis B Surface Ag negative  - T. pallidum Ab negative  - Aspirin 81 mg PO daily  - Atorvastatin 80 mg PO nightly    # Dysphagia  - ENT previously consulted, noted laryngeal Right-sided vocal cord paralysis  - s/p NGT  - MBSS performed, patient cleared for DASH/TLC regular consistency diet by SLP on 6/8/21    #Hyperlipidemia  - Atorvastatin 80 mg PO nightly    #Hypertension  - Amlodipine 2.5 mg PO daily     -Pain control: Tylenol PRN    -GI/Bowel Mgmt: Pantoprazole 40 mg PO daily before breakfast    -Bladder management: Patient continent of bladder     -Skin:  No active issues at this time    -FEN     - Diet: DASH/TLC, regular consistency solids with thin liquids     Precautions / PROPHYLAXIS:      - Falls    - Weight bearing status: WBAT      - DVT prophylaxis: Lovenox 40 mg SubQ daily      MEDICAL PROGNOSIS: GOOD            REHAB POTENTIAL: GOOD             ESTIMATED DISPOSITION: HOME WITH HOME CARE       [ x ]  The goals of the IRF admission were discussed with the patient and or family member, who agreed             ELOS:  [ x ] 7-14    [    ]  14-21    [    ]  Other    THERAPY ORDERS and INITIAL INDIVIDUALIZED PLAN OF CARE:  This initial individualized interdisciplinary plan of care, which was established by me (the attending physiatrist), is based on elements from the post admission evaluation. The interdisciplinary therapy program is to be at least 3 hrs a day, at least 5 days per week from from physical, occupational and/ or speech therapies as ordered by me below.      [ x  ] P.T. 90 mins. /day at least 5 out of 7 days:  [  x ] superficial  modalities prn, [ x  ] A/AAROM, [ x  ] PREs, [ x  ] transfer training,            [ x  ] progressive ambulation, [x   ] stairs                                               [ x  ] O.T. 90 mins. /day at least 5 out of 7 days::  [ x  ] modalities prn,  [ x  ]A/AAROM, [ x  ] PREs, [  x ] functional transfer training, [ x  ] ADL training           [ x ] cognitive/ perceptual eval and training, [   ] splint eval                                                  [ x ] S.L.P:  [ x ] speech eval, [ x ] swallow eval     [ x ] Neuropsychology eval     [ x ] Individualized rec. therapy      RATIONALE FOR INPATIENT ADMISSION - Patient demonstrates the following: (check all that apply)  [X] Medically appropriate for acute rehabilitation admission. Requires interdisiplinary therapy consisting of at least PT and OT, at least 3 hrs. a day at least 5 days a week  [X] Has attainable rehab goals with an appropriate initial discharge plan  [X] Has rehabilitation potential (expected to make a significant improvement within a reasonable period of time)  [X] Requires close medical management by a rehab physician, rehab nursing care,  and comprehensive interdisciplinary team (including PT, OT)    [X] Requires evaluation by a physiatrist at least 3 days a week to evaluate and manage and coordinate rehab and medical problems   ASSESSMENT/PLAN    Rehab of clinically diagnosed CVA, right cerebellar penumbra, not seen on MRI, with impaired balance, unsteady gait, Right-sided facial numbness, and dysphagia. NIHSS 3. mRS 4.  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 Structural Cardio f/u as outpatient    #Hepatitis C Ab positive  - Hepatitis B Surface Ab and Core Ab positive, Hepatitis B Surface Ag negative    # Dysphagia  - ENT previously consulted, noted laryngeal Right-sided vocal cord paralysis  - s/p NGT  - MBSS performed, patient cleared for DASH/TLC regular consistency diet by SLP on 6/8/21    #Hyperlipidemia  - Atorvastatin 80 mg PO nightly    #Hypertension  - Amlodipine 2.5 mg PO daily     -Pain control: Tylenol PRN    -GI/Bowel Mgmt: Pantoprazole 40 mg PO daily before breakfast    -Bladder management: Patient continent of bladder     -Skin:  No active issues at this time    -FEN     - Diet: DASH/TLC, regular consistency solids with thin liquids     Precautions / PROPHYLAXIS:      - Falls    - Weight bearing status: WBAT      - DVT prophylaxis: Lovenox 40 mg SubQ daily      MEDICAL PROGNOSIS: GOOD            REHAB POTENTIAL: GOOD             ESTIMATED DISPOSITION: HOME WITH HOME CARE       [ x ]  The goals of the IRF admission were discussed with the patient and or family member, who agreed             ELOS:  [ x ] 7-14    [    ]  14-21    [    ]  Other    THERAPY ORDERS and INITIAL INDIVIDUALIZED PLAN OF CARE:  This initial individualized interdisciplinary plan of care, which was established by me (the attending physiatrist), is based on elements from the post admission evaluation. The interdisciplinary therapy program is to be at least 3 hrs a day, at least 5 days per week from from physical, occupational and/ or speech therapies as ordered by me below.      [ x  ] P.T. 90 mins. /day at least 5 out of 7 days:  [  x ] superficial  modalities prn, [ x  ] A/AAROM, [ x  ] PREs, [ x  ] transfer training,            [ x  ] progressive ambulation, [x   ] stairs                                               [ x  ] O.T. 90 mins. /day at least 5 out of 7 days::  [ x  ] modalities prn,  [ x  ]A/AAROM, [ x  ] PREs, [  x ] functional transfer training, [ x  ] ADL training           [ x ] cognitive/ perceptual eval and training, [   ] splint eval                                                  [ x ] S.L.P:  [ x ] speech eval, [ x ] swallow eval     [ x ] Neuropsychology eval     [ x ] Individualized rec. therapy      RATIONALE FOR INPATIENT ADMISSION - Patient demonstrates the following: (check all that apply)  [X] Medically appropriate for acute rehabilitation admission. Requires interdisiplinary therapy consisting of at least PT and OT, at least 3 hrs. a day at least 5 days a week  [X] Has attainable rehab goals with an appropriate initial discharge plan  [X] Has rehabilitation potential (expected to make a significant improvement within a reasonable period of time)  [X] Requires close medical management by a rehab physician, rehab nursing care,  and comprehensive interdisciplinary team (including PT, OT)    [X] Requires evaluation by a physiatrist at least 3 days a week to evaluate and manage and coordinate rehab and medical problems

## 2021-06-08 NOTE — H&P ADULT - NSHPREVIEWOFSYSTEMS_GEN_ALL_CORE
Constitutional:    [ x ] WNL           [   ] poor appetite   [   ] insomnia   [   ] tired   Cardio:                [ x ] WNL           [   ] CP   [   ] ETIENNE   [   ] palpitations               Resp:                   [  ] WNL           [   ] SOB   [ x ] cough, intermittent, dry   [   ] wheezing   GI:                        [ x ] WNL           [   ] constipation   [   ] diarrhea   [   ] abdominal pain   [   ] nausea   [   ] emesis                                :                      [ x ] WNL           [   ] WESTBROOK  [   ] dysuria   [   ] difficulty voiding             Endo:                   [ x ] WNL          [   ] polyuria   [   ] temperature intolerance                 Skin:                     [ x ] WNL          [   ] pain   [   ] wound   [   ] rash   MSK:                    [ x ] WNL          [   ] muscle pain   [   ] joint pain/ stiffness   [   ] muscle tenderness   [   ] swelling   Neuro:                 [   ] WNL          [ x ] Right-sided HA, since admission   [   ] change in vision   [   ] tremor   [ x ] weakness   [   ]dysphagia              Cognitive:           [ x ] WNL           [   ]confusion      Psych:                  [ x ] WNL           [   ] hallucinations   [   ]agitation   [   ] delusion   [   ]depression

## 2021-06-08 NOTE — H&P ADULT - NSHPSOCIALHISTORY_GEN_ALL_CORE
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  Patient denies past or present tobacco use, endorses occasional alcohol use, and denies recreational drug use.

## 2021-06-08 NOTE — H&P ADULT - NSHPPHYSICALEXAM_GEN_ALL_CORE
PHYSICAL EXAMINATION   VItals: T(C): 37.8 (06-08-21 @ 14:05), Max: 37.9 (06-08-21 @ 13:00)  HR: 103 (06-08-21 @ 13:00) (86 - 103)  BP: 139/91 (06-08-21 @ 13:00) (135/97 - 165/75)  RR: 18 (06-08-21 @ 13:00) (18 - 18)  SpO2: 98% (06-08-21 @ 13:00) (97% - 98%)    General:[   ] NAD, Resting Comfortable,   [   ] other:                                HEENT: [   ] NC/AT, EOMI, PERRL , Normal Conjunctivae,   [   ] other:  Cardio: [   ] RRR, no murmer,   [   ] other:                              Pulm: [   ] No Respiratory Distress,  Lungs CTAB,   [   ] other:                       Abdomen: [   ]ND/NT, Soft,   [   ] other:    : [   ] NO WESTBROOK CATHETER, [   ] WESTBROOK CATHETER- no meatal tear, no discharge, [   ] other:                                            MSK: [   ] No joint swelling, Full ROM,   [   ] other:                                         Ext: [   ]No C/C/E, No calf tenderness,   [   ]other:    Skin: [   ]intact,   [   ] other:                                                                   Neurological Examination:  Cognitive: [    ] AAO x 3,   [    ]  other:                                                                      Attention:  [    ] intact,   [    ]  other:                            Memory: [    ] intact,    [    ]  other:     Mood/Affect: [    ] wnl,    [    ]  other:                                                                             Communication: [    ]Fluent, no dysarthria, following commands:  [    ] other:   CN II - XII:  [    ] intact,  [    ] other:                                                                                        Motor:   RIGHT UE: [   ] WNL,  [   ] other:  LEFT    UE: [   ] WNL,  [   ] other:  RIGHT LE: [   ] WNL,  [   ] other:   LEFT    LE: [   ] WNL,  [   ] other:    Tone: [    ] wnl,   [    ]  other:  DTRs: [   ]symmetric, [   ] other:  Coordination:   [    ] intact,   [    ] other:                                                                           Sensory: [    ] Intact to light touch,   [    ] other: PHYSICAL EXAMINATION   VItals: T(C): 37.8 (06-08-21 @ 14:05), Max: 37.9 (06-08-21 @ 13:00)  HR: 103 (06-08-21 @ 13:00) (86 - 103)  BP: 139/91 (06-08-21 @ 13:00) (135/97 - 165/75)  RR: 18 (06-08-21 @ 13:00) (18 - 18)  SpO2: 98% (06-08-21 @ 13:00) (97% - 98%)    General:[ x ] NAD, Resting Comfortable,   [   ] other:                                HEENT: [ x ] NC/AT, EOMI, PERRL , Normal Conjunctivae,   [   ] other:  Cardio: [ x ] RRR, no murmur,   [   ] other:                              Pulm: [ x ] No Respiratory Distress,  Lungs CTAB,   [   ] other:                       Abdomen: [ x ]ND/NT, Soft,   [   ] other:    : [ x ] NO WESTBROOK CATHETER, [   ] WESTBROOK CATHETER- no meatal tear, no discharge, [   ] other:                                            MSK: [ x ] No joint swelling, Full ROM,   [   ] other:                                         Ext: [ x ]No C/C/E, No calf tenderness,   [   ]other:    Skin: [ x ]intact,   [   ] other:                                                                   Neurological Examination:  Cognitive: [ x ] AAO x 3,   [    ]  other:                                                                      Attention:  [ x ] intact,   [    ]  other:                            Memory: [ x ] intact,    [    ]  other:     Mood/Affect: [ x ] wnl,    [    ]  other:                                                                             Communication: [ x ]Fluent, no dysarthria, following commands:  [    ] other:   CN II - XII:  [ x ] intact,  [    ] other:                                                                                        Motor:   RIGHT UE: [   ] WNL,  [ x ] other: 4/5 shoulder abduction, elbow flexion/extension, and hand   LEFT    UE: [   ] WNL,  [ x ] other: 4/5 shoulder abduction, elbow flexion/extension, and hand   RIGHT LE: [   ] WNL,  [ x ] other: 4/5 hip flexion, knee flexion/extension, ankle dorsiflexion/plantarflexion  LEFT    LE: [   ] WNL,  [ x ] other: 4/5 hip flexion, knee flexion/extension, ankle dorsiflexion/plantarflexion    Tone: [ x ] wnl,   [    ]  other:  DTRs: [ x ]symmetric, [   ] other:  Coordination:   [ x ] intact, with bilateral finger-to-nose   [    ] other:                                                                           Sensory: [  ] Intact to light touch,   [ x ] other: intact to light touch for bilateral upper and lower extremities, but decreased sensation over Right ophthalmic, maxillary, and mandibular areas. PHYSICAL EXAMINATION   VItals: T(C): 37.8 (06-08-21 @ 14:05), Max: 37.9 (06-08-21 @ 13:00)  HR: 103 (06-08-21 @ 13:00) (86 - 103)  BP: 139/91 (06-08-21 @ 13:00) (135/97 - 165/75)  RR: 18 (06-08-21 @ 13:00) (18 - 18)  SpO2: 98% (06-08-21 @ 13:00) (97% - 98%)    General:[ x ] NAD, Resting Comfortable,   [   ] other:                                HEENT: [ x ] NC/AT, EOMI, PERRL , Normal Conjunctivae,   [   ] other:  Cardio: [ x ] RRR, no murmur,   [   ] other:                              Pulm: [ x ] No Respiratory Distress,  Lungs CTAB,   [   ] other:                       Abdomen: [ x ]ND/NT, Soft,   [   ] other:    : [ x ] NO WESTBROOK CATHETER, [   ] WESTBROOK CATHETER- no meatal tear, no discharge, [   ] other:                                            MSK: [ x ] No joint swelling, Full ROM,   [   ] other:                                         Ext: [ x ]No C/C/E, No calf tenderness,   [   ]other:    Skin: [ x ]intact,   [   ] other:                                                                   Neurological Examination:  Cognitive: [ x ] AAO x 3,   [    ]  other:                                                                      Attention:  [ x ] intact,   [    ]  other:                            Memory: [ x ] intact,    [    ]  other:     Mood/Affect: [ x ] wnl,    [    ]  other:                                                                             Communication: [ x ]Fluent, no dysarthria, following commands:  [    ] other:   CN II - XII:  [  ] intact,  [   x ] other: decreased sensation of right side of face                                                                                        Motor:   RIGHT UE: [   ] WNL,  [ x ] other: 4/5 shoulder abduction, elbow flexion/extension, and hand   LEFT    UE: [   ] WNL,  [ x ] other: 4/5 shoulder abduction, elbow flexion/extension, and hand   RIGHT LE: [   ] WNL,  [ x ] other: 4/5 hip flexion, knee flexion/extension, ankle dorsiflexion/plantarflexion  LEFT    LE: [   ] WNL,  [ x ] other: 4/5 hip flexion, knee flexion/extension, ankle dorsiflexion/plantarflexion    Tone: [ x ] wnl,   [    ]  other:  DTRs: [ x ]symmetric, [   ] other:  Coordination:   [ x ] intact, with bilateral finger-to-nose   [    ] other:                                                                           Sensory: [  ] Intact to light touch,   [ x ] other: intact to light touch for bilateral upper and lower extremities, but decreased sensation over Right ophthalmic, maxillary, and mandibular areas.

## 2021-06-08 NOTE — DISCHARGE NOTE NURSING/CASE MANAGEMENT/SOCIAL WORK - PATIENT PORTAL LINK FT
You can access the FollowMyHealth Patient Portal offered by [facility] by registering at the following website: [email or web address]. By joining [email or web address]’s FollowMyHealth portal, you will also be able to view your health information using other applications (apps) compatible with our system.

## 2021-06-08 NOTE — SWALLOW VFSS/MBS ASSESSMENT ADULT - UNSUCCESSFUL STRATEGIES TRIALED DURING PROCEDURE
attempted to use as alternative to multiple swallows however ineffective in preventing pyriform sinus residue./head turn to the right

## 2021-06-08 NOTE — SWALLOW VFSS/MBS ASSESSMENT ADULT - SUCCESSFUL STRATEGIES TRIALED DURING PROCEDURE
consecutive swallows clears pyriform sinus residue consecutive swallows cleared pyriform sinus residue consecutive swallows

## 2021-06-08 NOTE — DISCHARGE NOTE PROVIDER - HOSPITAL COURSE
55M w/ PMHx of HLD on statin and HTN? ( non compliant with Anti HTN meds) who presents to the ED for stroke like symptoms.     # Acute ischemic stroke  - NIHSS on arrival 3, Not a TpA candidate.   - CT Brain Stroke Protocol:  No evidence of acute intracranial hemorrhage or large territory infarct. 1 cm dural based lesion of the left parietal convexity likely reflecting a meningioma.  - CT perfusion showed  *Occlusion of the proximal V4 segment of the right vertebral artery just after the PICA origin. The PICA demonstrates diminished opacification. Corresponding ischemic penumbra within the right cerebellar hemisphere.  - MR brain: Mild chronic microvascular changes  - c/w Asa, statin  - Echo: EF 68%, Large PFO w/ Lt to Rt shunt  -  VA duplex - negative     # Dysphagia  - ENT Eval: Laryngeal right-sided vocal cord paralysis  - barium swallow study - passed  - started on DASH    # HTN -  c/w amlodipine   55M w/ PMHx of HLD on statin and HTN? ( non compliant with Anti HTN meds) who presents to the ED for stroke like symptoms.     # Acute ischemic stroke  - NIHSS on arrival 3, Not a TpA candidate.   - CT Brain Stroke Protocol:  No evidence of acute intracranial hemorrhage or large territory infarct. 1 cm dural based lesion of the left parietal convexity likely reflecting a meningioma.  - CT perfusion showed  *Occlusion of the proximal V4 segment of the right vertebral artery just after the PICA origin. The PICA demonstrates diminished opacification. Corresponding ischemic penumbra within the right cerebellar hemisphere.  - MR brain: Mild chronic microvascular changes  - c/w Asa, statin  - Echo: EF 68%, Large PFO w/ Lt to Rt shunt  -  VA duplex - negative     # Dysphagia  - ENT Eval: Laryngeal right-sided vocal cord paralysis  - barium swallow study - passed  - started on DASH    # HTN -  c/w amlodipine    Attending Addendum:  patient seen and examined earlier today.  discussed with resident and with rehab attending.  patient passed speech and swallow - could have regular diet.  had venous duplex - no DVT.  patient with low grade temp after barium swallow, however no obvious source of infection and felt fine.  patient medically stable for transfer to acute rehab.  will need evaluation for ?need for PFO closure as outpatient.  meanwhile will give antithrombotic therapy.  also high dose statin because of cerebrovascular disease.

## 2021-06-08 NOTE — PROGRESS NOTE ADULT - SUBJECTIVE AND OBJECTIVE BOX
Hospital Day:  5d    Subjective: Patient is a 55y old  Male who presents with a chief complaint of CVA (07 Jun 2021 15:09)      Pt seen and evaluated at bedside.   Complaints: none  Over the night Events: None    Past Medical Hx:   No pertinent past medical history    Hyperlipidemia    Hypertension      Past Sx:    Allergies:  No Known Allergies    Current Meds:   Standng Meds:  amLODIPine   Tablet 2.5 milliGRAM(s) Oral daily  aspirin  chewable 81 milliGRAM(s) Oral daily  atorvastatin 80 milliGRAM(s) Oral at bedtime  chlorhexidine 4% Liquid 1 Application(s) Topical <User Schedule>  enoxaparin Injectable 40 milliGRAM(s) SubCutaneous at bedtime  pantoprazole   Suspension 40 milliGRAM(s) Oral before breakfast    PRN Meds:  acetaminophen   Tablet .. 650 milliGRAM(s) Oral every 6 hours PRN Moderate Pain (4 - 6)      Vital Signs:   T(F): 100.3 (06-08-21 @ 13:00), Max: 100.3 (06-08-21 @ 13:00)  HR: 103 (06-08-21 @ 13:00) (86 - 103)  BP: 139/91 (06-08-21 @ 13:00) (135/97 - 165/75)  RR: 18 (06-08-21 @ 13:00) (18 - 18)  SpO2: 98% (06-08-21 @ 13:00) (97% - 98%)    Physical Exam:   GENERAL: NAD, Resting in bed  HEENT: NCAT, has NGT  CHEST/LUNG: Clear to auscultation bilaterally; No wheezing or rubs.   HEART: Regular rate and rhythm; No murmurs, rubs, or gallops  ABDOMEN: Bowel sounds present; Soft, Nontender, Nondistended.   EXTREMITIES:  No clubbing, cyanosis, or edema  NERVOUS SYSTEM:  Alert & Oriented X3    FLUID BALANCE    06-06-21 @ 07:01  -  06-07-21 @ 07:00  --------------------------------------------------------  IN: 900 mL / OUT: 400 mL / NET: 500 mL    06-07-21 @ 07:01 - 06-08-21 @ 07:00  --------------------------------------------------------  IN: 1100 mL / OUT: 0 mL / NET: 1100 mL        Labs:                         13.0   5.01  )-----------( 221      ( 07 Jun 2021 07:15 )             38.7       07 Jun 2021 07:15    139    |  101    |  26     ----------------------------<  91     4.1     |  24     |  1.0      Ca    10.2       07 Jun 2021 07:15    TPro  7.3    /  Alb  4.4    /  TBili  0.5    /  DBili  x      /  AST  51     /  ALT  39     /  AlkPhos  98     07 Jun 2021 07:15  Radiology:     < from: [facility] Duplex Lower Ext Vein Scan, Miguel Angel (06.07.21 @ 14:42) >  Impression:    No evidence of deep venous thrombosis in the bilateral lower extremities.    < end of copied text >

## 2021-06-08 NOTE — DISCHARGE NOTE PROVIDER - PROVIDER TOKENS
PROVIDER:[TOKEN:[[id number]],FOLLOWUP:[2 weeks]],PROVIDER:[TOKEN:[[id number]],FOLLOWUP:[2 weeks]],PROVIDER:[TOKEN:[[id number]],FOLLOWUP:[2 weeks]]

## 2021-06-08 NOTE — DISCHARGE NOTE PROVIDER - NSDCMRMEDTOKEN_GEN_ALL_CORE_FT
amLODIPine 2.5 mg oral tablet: 1 tab(s) orally once a day  aspirin 81 mg oral tablet, chewable: 1 tab(s) orally once a day  atorvastatin 80 mg oral tablet: 1 tab(s) orally once a day (at bedtime)  enoxaparin:

## 2021-06-08 NOTE — SWALLOW VFSS/MBS ASSESSMENT ADULT - DIAGNOSTIC IMPRESSIONS
Mild pharyngeal dysphagia for thin and nectar thick liquids, moderate for puree and solids. No aspiration events observed during the study. Pharyngeal residue increased w/ heavier viscosities, however multiple swallows clears residue

## 2021-06-08 NOTE — DISCHARGE NOTE PROVIDER - NSDCCPCAREPLAN_GEN_ALL_CORE_FT
PRINCIPAL DISCHARGE DIAGNOSIS  Diagnosis: Lacunar stroke  Assessment and Plan of Treatment: Your symptoms were likely due to lacunar stroke as per neurology. During stroke work up it was revealed you have a hole in your heart it is called PFO. Due to it's size it is recommended that you follow up with structural heart specialist (dr. yoon - contact info is provided) when you are dischared from the hospital. Please continue taking medications as prescribed. follow up with neurology and your primary care provider outpatient. if you don't have primary car provider then you have been provider with contact - Dr. avalos please follow up with him outpatient.      SECONDARY DISCHARGE DIAGNOSES  Diagnosis: Difficulty swallowing  Assessment and Plan of Treatment:      PRINCIPAL DISCHARGE DIAGNOSIS  Diagnosis: Lacunar stroke  Assessment and Plan of Treatment: Your symptoms were likely due to lacunar stroke as per neurology. During stroke work up it was revealed you have a hole in your heart it is called PFO. Due to it's size it is recommended that you follow up with structural heart specialist (dr. yoon - contact info is provided) when you are dischared from the hospital. Please continue taking medications as prescribed. follow up with neurology and your primary care provider outpatient. if you don't have primary car provider then you have been provider with contact - Dr. avalos please follow up with him outpatient.      SECONDARY DISCHARGE DIAGNOSES  Diagnosis: Difficulty swallowing  Assessment and Plan of Treatment: Your symptoms is most likely due to the stroke. However you improved and started on diet that you tolerated. if your symptoms return then visit your PCP or nearest ED.

## 2021-06-08 NOTE — DISCHARGE NOTE PROVIDER - CARE PROVIDERS DIRECT ADDRESSES
[email or web address]

## 2021-06-08 NOTE — DISCHARGE NOTE PROVIDER - CARE PROVIDER_API CALL
Phillip Monahan (DO)  Medicine  Physicians  [address], [address] [place] [address]  Phone: [phone]  Fax: [phone]  Follow Up Time: 2 weeks    Brandon Salvador)  Neurology  [address], [address]  [place] [address]  Phone: [phone]  Fax: [phone]  Follow Up Time: 2 weeks    Dru Sandhu)  Cardiovascular Disease; Internal Medicine  [address]  [place] [address]  Phone: [phone]  Fax: [phone]  Follow Up Time: 2 weeks

## 2021-06-08 NOTE — CHART NOTE - NSCHARTNOTEFT_GEN_A_CORE
Registered Dietitian Follow-Up - assessment completed by GLENDA German     Patient Profile Reviewed                           Yes [x]   No []     Nutrition History Previously Obtained        Yes [x]  No []       Pertinent Medical Interventions: Admitted with CVA. Noted acute ischemic stroke. Dysphagia - noted laryngeal right-sided vocal cord paralysis per ENT.     Diet order: DASH/TLC diet - previously received NG tube feeds until 6/8; diet advanced today.     Anthropometrics:  - Ht. 157.5 cm.  - Wt. 68.8 kg (6/3) - no new wt at this time.  - BMI 27.7  - IBW 53.6 kg     Pertinent Lab Data: 6/7: RBC-4.48, H/H-13.0/38.7, BUN-26; 6/4: WlsA0E-7.8%     Pertinent Meds: enoxaparin, protonix, amlodipine, atorvastatin     Physical Findings:  - Appearance: alert & oriented  - GI function: no BM reported this admit; c/o constipation. LIP notified.  - Tubes: no feeding tube  - Oral/Mouth cavity: per 6/8 SLP eval: "Mild pharyngeal dysphagia for thin and nectar thick liquids, moderate for puree and solids. No aspiration events observed during the study. Pharyngeal residue increased w/ heavier viscosities, however multiple swallows clears residue". Recommends regular consistency diet w/ thin liquids.  - Skin: intact     Nutrition Requirements  Weight Used: 68.8 kg per 6/5 RD assessment     Estimated Energy Needs    Continue [x]  Adjust []  1406 x AF 1.1-1.3=[id number]/day        Estimated Protein Needs    Continue [x]  Adjust []  69-71grams/day (1-1.3grams/kg) -Due to CVA        Estimated Fluid Needs        Continue []  Adjust [x]  1 mL/kcal or per LIP     Nutrient Intake: Po diet initiated today; no meal trays provided at this time. Will monitor tolerance to diet advance.        [x] Previous Nutrition Diagnosis: Inadequate enteral nutrition infusion (discontinued)    [x] No active nutrition diagnosis identified at this time, however, will maintain pt at risk to monitor tolerance to diet advance and need for additional nutrition intervention.     Nutrition Intervention: Meals & Snacks     Goal/Expected Outcome: Pt to demonstrate tolerance to diet, with at least 75% po intake over next 3 days.     Indicator/Monitoring: Energy intake, glucose profile, renal profile, nutrition focused physical findings, body composition.    Recommendation: Diet order texture/consistency per SLP. RD to monitor tolerance to diet advance & need for additional nutrition intervention.

## 2021-06-08 NOTE — H&P ADULT - HISTORY OF PRESENT ILLNESS
55 year-old male with a PMHx of hyperlipidemia and hypertension (non-compliant with meds) who presented to ED on 6/3/21 with complaint of headache, weakness, decreased sensation on the Right side of his face, and dizziness since 1 AM. Patient states he felt perfectly fine when he went to bed on evening of 6/2/21. NIHSS 3 on arrival to ED. Out of window for tPA. CT Head without contrast negative for acute intracranial hemorrhage or large territorial infarct, with 1 cm dural-based lesion of the Left parietal convexity, likely reflecting a meningioma. 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 Patient admitted to Neurology service for management of clinically diagnosed CVA. Patient started on Aspirin and high-intensity statin. +Patent foramen ovale on TTE. Patient with apparent Right-sided vocal cord paralysis, initially kept NPO with NG tube in place. SLP evaluated patient with Modified Barium Swallow, and patient cleared for regular diet with thin liquids.    The patient was evaluated by PT, and required contact guard assist-minimal assist with bed mobility, minimal-moderate assist with transfers, and moderate assist with ambulation 25 ft x2 (once with RW and then holding onto wall railing). Prior to admission, patient was fully independent in ADLs and ambulation without an assistive device. The patient was evaluated by the Physiatry team on the Neurology service and was found to be a good candidate for acute inpatient rehab.    Prior level of function: Patient states he was independent with ADLs and ambulation without an assistive device.  Current level of function: Contact guard assist-minimal assist with bed mobility, minimal-moderate assist with transfers, and moderate assist with ambulation 25 ft x2 (once with RW and then holding onto wall railing). 55 year-old male with a PMHx of hyperlipidemia and hypertension (non-compliant with meds) who presented to ED on 6/3/21 with complaint of headache, weakness, decreased sensation on the Right side of his face, and dizziness since 1 AM. Patient states he felt perfectly fine when he went to bed on evening of 6/2/21. NIHSS 3 on arrival to ED. Out of window for tPA. CT Head without contrast negative for acute intracranial hemorrhage or large territorial infarct, with 1 cm dural-based lesion of the Left parietal convexity, likely reflecting a meningioma. 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 Patient admitted to Neurology service for management of clinically diagnosed CVA. Patient started on Aspirin and high-intensity statin. +Patent foramen ovale on TTE. Patient with apparent Right-sided vocal cord paralysis, initially kept NPO with NG tube in place. SLP evaluated patient with Modified Barium Swallow, and patient cleared for regular diet with thin liquids the day of transfer to Rehab.    The patient was evaluated by PT, and required contact guard assist-minimal assist with bed mobility, minimal-moderate assist with transfers, and moderate assist with ambulation 25 ft x2 (once with RW and then holding onto wall railing). Prior to admission, patient was fully independent in ADLs and ambulation without an assistive device. The patient was evaluated by the Physiatry team on the Neurology service and was found to be a good candidate for acute inpatient rehab.    Prior level of function: Patient states he was independent with ADLs and ambulation without an assistive device.  Current level of function: Contact guard assist-minimal assist with bed mobility, minimal-moderate assist with transfers, and moderate assist with ambulation 25 ft x2 (once with RW and then holding onto wall railing).

## 2021-06-08 NOTE — PROGRESS NOTE ADULT - PROVIDER SPECIALTY LIST ADULT
Internal Medicine
Internal Medicine
Neurology
Neurology
Critical Care
Hospitalist
Internal Medicine
Critical Care

## 2021-06-08 NOTE — SWALLOW VFSS/MBS ASSESSMENT ADULT - MODE OF PRESENTATION
cup/spoon/straw/self fed/fed by clinician spoon/self fed/fed by clinician cup/spoon/straw/fed by clinician

## 2021-06-08 NOTE — PROGRESS NOTE ADULT - SUBJECTIVE AND OBJECTIVE BOX
Stroke Progress Note: patient is examined at the bedside, continues to have unsteady gait with r sided ataxia, and facial sensory asymmetry.     BRIGETTE CARRILLO    1. Chief Complaint: right sided ataxia, dysphagia    HPI:  Pt is a 55 y.o. male with PMH of HLD on statin and HTN? ( non compliant with Anti HTN meds) who presents to the ED for stroke like symptoms.   Pt states he experienced since 1 am this morning-  HA, weakness, dizziness and R sided change in sensation of face and RUE. Upon EMS arrival, had difficulty ambulating. Denies taking any medications PTA. Pt admits to HA intermittently for past week, alleviated by NSAIDS. No head trauma, LOC seizure activity or ASA usage.  VS on admission stable. NIHSS on arrival 3, CODE stroke on admission.      CT Brain Stroke Protocol   No evidence of acute intracranial hemorrhage or large territory infarct. 1 cm dural based lesion of the left parietal convexity likely reflecting a meningioma.    Not a TpA candidate for low NIHSS and delayed presentation.     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 The PICA demonstrates diminished opacification. Corresponding ischemic penumbra within the right cerebellar hemisphere (Tmax >6s volume 15 cc, no abnormal CBF).      Slight irregularity of the lateral wall of the right cavernous ICA without discrete aneurysm.     Laryngeal findings suggestive of a right-sided vocal cord paralysis.    Pt seen by neurology. s/p 325 ASA, lipitor 80 and is being admitted for further management.      (2021 10:56)      2. Relevant PMH:   Prior ischemic stroke/TIA[ ], Afib [ ], CAD [ ], HTN [ ], DLD [ ], DM [ ], PVD [ ], Obesity [ ],   Sedentary lifestyle [ ], CHF [ ], GRACE [ ], Cancer Hx [ ].    3. Social History: Smoking [ ], Drug Use [ ], Alcohol Use [ ], Other [ ]    4. Possible Location of Stroke:    5. Relevant Brain Tissue Imagin. Relevant Cerebrovascular Imaging:   CT Angio Neck w/ IV Cont:   EXAM:  CT ANGIO BRAIN (W)AW IC        EXAM:  CT ANGIO NECK (W)AW IC        EXAM:  CT PERFUSION W MAPS IC            PROCEDURE DATE:  2021            INTERPRETATION:  Clinical History / Reason for exam: Stroke code. Dizziness, headache, weakness, right facial paresthesias.    Technique: CT angiogram of the head and neck including perfusion study of the brain.  Contiguous CT axial images of the head and neck were obtained following the bolus intravenous administration of 120 cc Optiray withmultiple 3-D and MIP reformats with perfusion mapping performed on a separate 3D workstation (RAPID).    Correlation made with accompanying noncontrast head CT.    Findings:    CTA neck:  The visualized aortic arch and great vessel origins are patent.    The common, internal and external carotid arteries are patent.    The extra-cranial vertebral arteries are patent.    CTA brain: The distal segments of the internal carotid arteries are patent. Slight irregularity along the lateral wall of the right cavernous ICA is noted on series 2 image 378 without discrete saccular aneurysm. The anterior and middle cerebral arteries are patent.    There is occlusion of the proximal V4 segment of the right vertebral artery after the origin of the right posterior inferior cerebral artery, which demonstrates diminished opacification.    The left vertebral artery is patent. The basilar artery is patent. The posterior cerebral arteries are patent. The basilar artery is patent. The posterior cerebral arteries are patent.    Perfusion: There is evidence for ischemia within the right cerebellar hemisphere with Tmax >6s volume of 15 cc. There is no evidence of abnormal cerebral blood flow. Mismatch volume is 15 cc.    Other:  Mild cervical spine degenerative changes with multilevel anterior bridging osteophytes and reversal of the normal cervical lordosis noted.  Asymmetric dilatation of the right laryngeal ventricle and pyriform sinus with the visualized right vocal cord. Findings can be seen in the setting of right-sided vocal cord paralysis.    IMPRESSION:    1.  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 The PICA demonstrates diminished opacification. Corresponding ischemic penumbra within the right cerebellar hemisphere (Tmax >6s volume 15 cc, no abnormal CBF).    2.  Slight irregularity of the lateral wall of the right cavernous ICA without discrete aneurysm.    3.  Laryngeal findings suggestive of a right-sided vocal cord paralysis.    *Spoke with TORI ALVAREZ on 6/3/2021 8:22 AM with readback.              LOI CALDERON MD; Attending Radiologist  This document has been electronically signed. Varun  3 2021  8:39AM (21 @ 08:09)     CT Angio Head w/ IV Cont:   EXAM:  CT ANGIO BRAIN (W)AW IC        EXAM:  CT ANGIO NECK (W)AW IC        EXAM:  CT PERFUSION W MAPS IC            PROCEDURE DATE:  2021            INTERPRETATION:  Clinical History / Reason for exam: Stroke code. Dizziness, headache, weakness, right facial paresthesias.    Technique: CT angiogram of the head and neck including perfusion study of the brain.  Contiguous CT axial images of the head and neck were obtained following the bolus intravenous administration of 120 cc Optiray withmultiple 3-D and MIP reformats with perfusion mapping performed on a separate 3D workstation (RAPID).    Correlation made with accompanying noncontrast head CT.    Findings:    CTA neck:  The visualized aortic arch and great vessel origins are patent.    The common, internal and external carotid arteries are patent.    The extra-cranial vertebral arteries are patent.    CTA brain: The distal segments of the internal carotid arteries are patent. Slight irregularity along the lateral wall of the right cavernous ICA is noted on series 2 image 378 without discrete saccular aneurysm. The anterior and middle cerebral arteries are patent.    There is occlusion of the proximal V4 segment of the right vertebral artery after the origin of the right posterior inferior cerebral artery, which demonstrates diminished opacification.    The left vertebral artery is patent. The basilar artery is patent. The posterior cerebral arteries are patent. The basilar artery is patent. The posterior cerebral arteries are patent.    Perfusion: There is evidence for ischemia within the right cerebellar hemisphere with Tmax >6s volume of 15 cc. There is no evidence of abnormal cerebral blood flow. Mismatch volume is 15 cc.    Other:  Mild cervical spine degenerative changes with multilevel anterior bridging osteophytes and reversal of the normal cervical lordosis noted.  Asymmetric dilatation of the right laryngeal ventricle and pyriform sinus with the visualized right vocal cord. Findings can be seen in the setting of right-sided vocal cord paralysis.    IMPRESSION:    1.  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 The PICA demonstrates diminished opacification. Corresponding ischemic penumbra within the right cerebellar hemisphere (Tmax >6s volume 15 cc, no abnormal CBF).    2.  Slight irregularity of the lateral wall of the right cavernous ICA without discrete aneurysm.    3.  Laryngeal findings suggestive of a right-sided vocal cord paralysis.    *Spoke with TORI ALVAREZ on 6/3/2021 8:22 AM with readback.              LOI CALDERON MD; Attending Radiologist  This document has been electronically signed. Varun  3 2021  8:39AM (21 @ 08:09)     CT Perfusion w/ Maps w/ IV Cont:   EXAM:  CT ANGIO BRAIN (W)AW IC        EXAM:  CT ANGIO NECK (W)AW IC        EXAM:  CT PERFUSION W MAPS IC            PROCEDURE DATE:  2021            INTERPRETATION:  Clinical History / Reason for exam: Stroke code. Dizziness, headache, weakness, right facial paresthesias.    Technique: CT angiogram of the head and neck including perfusion study of the brain.  Contiguous CT axial images of the head and neck were obtained following the bolus intravenous administration of 120 cc Optiray withmultiple 3-D and MIP reformats with perfusion mapping performed on a separate 3D workstation (RAPID).    Correlation made with accompanying noncontrast head CT.    Findings:    CTA neck:  The visualized aortic arch and great vessel origins are patent.    The common, internal and external carotid arteries are patent.    The extra-cranial vertebral arteries are patent.    CTA brain: The distal segments of the internal carotid arteries are patent. Slight irregularity along the lateral wall of the right cavernous ICA is noted on series 2 image 378 without discrete saccular aneurysm. The anterior and middle cerebral arteries are patent.    There is occlusion of the proximal V4 segment of the right vertebral artery after the origin of the right posterior inferior cerebral artery, which demonstrates diminished opacification.    The left vertebral artery is patent. The basilar artery is patent. The posterior cerebral arteries are patent. The basilar artery is patent. The posterior cerebral arteries are patent.    Perfusion: There is evidence for ischemia within the right cerebellar hemisphere with Tmax >6s volume of 15 cc. There is no evidence of abnormal cerebral blood flow. Mismatch volume is 15 cc.    Other:  Mild cervical spine degenerative changes with multilevel anterior bridging osteophytes and reversal of the normal cervical lordosis noted.  Asymmetric dilatation of the right laryngeal ventricle and pyriform sinus with the visualized right vocal cord. Findings can be seen in the setting of right-sided vocal cord paralysis.    IMPRESSION:    1.  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 The PICA demonstrates diminished opacification. Corresponding ischemic penumbra within the right cerebellar hemisphere (Tmax >6s volume 15 cc, no abnormal CBF).    2.  Slight irregularity of the lateral wall of the right cavernous ICA without discrete aneurysm.    3.  Laryngeal findings suggestive of a right-sided vocal cord paralysis.    *Spoke with TORI ALVAREZ on 6/3/2021 8:22 AM with readback.              LOI CALDERON MD; Attending Radiologist  This document has been electronically signed. Varun  3 2021  8:39AM (21 @ 08:08)         7. Relevant blood tests:      8. Relevant cardiac rhythm monitorin. Relevant Cardiac Structure: (TTE/RACHELLE +/-):[ ]No intracardiac thrombus/[ ] no vegetation/[ ]no [place]/EF:    Home Medications:  amLODIPine 2.5 mg oral tablet: 1 tab(s) orally once a day (2021 14:32)  aspirin 81 mg oral tablet, chewable: 1 tab(s) orally once a day (2021 14:32)  atorvastatin 80 mg oral tablet: 1 tab(s) orally once a day (at bedtime) (2021 14:32)  enoxaparin:  (2021 14:32)      MEDICATIONS  (STANDING):  amLODIPine   Tablet 2.5 milliGRAM(s) Oral daily  aspirin  chewable 81 milliGRAM(s) Oral daily  atorvastatin 80 milliGRAM(s) Oral at bedtime  chlorhexidine 4% Liquid 1 Application(s) Topical <User Schedule>  enoxaparin Injectable 40 milliGRAM(s) SubCutaneous at bedtime  pantoprazole    Tablet 40 milliGRAM(s) Oral before breakfast      10. PT/OT/Speech/Rehab/S&Sw/ Cognitive eval results and recommendations:    11. Exam:    Vital Signs Last 24 Hrs  T(C): 37.8 (2021 14:05), Max: 37.9 (2021 13:00)  T(F): 100 (2021 14:05), Max: 100.3 (2021 13:00)  HR: 103 (2021 13:00) (86 - 103)  BP: 139/91 (2021 13:00) (135/97 - 165/75)  BP(mean): 107 (2021 20:40) (107 - 107)  RR: 18 (2021 13:00) (18 - 18)  SpO2: 98% (2021 13:00) (97% - 98%)    12.   NIH STROKE SCALE  Item	                                                        Score  1 a.	Level of Consciousness	               	0  1 b. LOC Questions	                                0  1 c.	LOC Commands	                               	0  2.	Best Gaze	                                        0  3.	Visual	                                                0  4.	Facial Palsy	                                        1  5 a.	Motor Arm - Left	                                0  5 b.	Motor Arm - Right	                        0  6 a.	Motor Leg - Left	                                0  6 b.	Motor Leg - Right	                                0  7.	Limb Ataxia	                                        1  8.	Sensory	                                                1  9.	Language	                                        0  10.	Dysarthria	                                        0  11.	Extinction and Inattention  	        0  ______________________________________  TOTAL	                                                        0    Total NIHSS on admission:      NIHSS yesterday:      NIHSS today: 3    mRS:  0 No symptoms at all  1 No significant disability despite symptoms; able to carry out all usual duties and activities without assistance  2 Slight disability; unable to carry out all previous activities, but able to look after own affairs  3 Moderate disability; requiring some help, but able to walk without assistance  4 Moderately severe disability; unable to walk without assistance and unable to attend to own bodily needs without assistance  5 Severe disability; bedridden, incontinent and requiring constant nursing care and attention  6 Dead      13. Impression: 56 yo male with right sided ataxia and unsteady gait. Clinically brain stem stroke with  Right vertebral artery occlusion at V4mand laryngeal findings suggestive of a right-sided vocal cord paralysis. Echo demonstrates intra-atrial septal aneurysm and large patent foramen ovale, with predominantly left to right shunting across the atrial septum. Currently downgraded to 3E with unchanged neuroexam.      15. Suggestions:   Continue Aspirin and statin  Outpatient 30 day event monitor  F/u hypercoag labs  Stroke clinic f/u in 2 weeks post d/c  Speech and ENT f/ups  PT/Rehab    Plan discussed with neuroattending Dr. Dow

## 2021-06-09 LAB
ALBUMIN SERPL ELPH-MCNC: 4.6 G/DL — SIGNIFICANT CHANGE UP (ref 3.5–5.2)
ALP SERPL-CCNC: 114 U/L — SIGNIFICANT CHANGE UP (ref 30–115)
ALT FLD-CCNC: 51 U/L — HIGH (ref 0–41)
ANION GAP SERPL CALC-SCNC: 14 MMOL/L — SIGNIFICANT CHANGE UP (ref 7–14)
AST SERPL-CCNC: 58 U/L — HIGH (ref 0–41)
BASOPHILS # BLD AUTO: 0.05 K/UL — SIGNIFICANT CHANGE UP (ref 0–0.2)
BASOPHILS NFR BLD AUTO: 1 % — SIGNIFICANT CHANGE UP (ref 0–1)
BILIRUB SERPL-MCNC: 0.3 MG/DL — SIGNIFICANT CHANGE UP (ref 0.2–1.2)
BUN SERPL-MCNC: 22 MG/DL — HIGH (ref 10–20)
CALCIUM SERPL-MCNC: 9.7 MG/DL — SIGNIFICANT CHANGE UP (ref 8.5–10.1)
CHLORIDE SERPL-SCNC: 100 MMOL/L — SIGNIFICANT CHANGE UP (ref 98–110)
CO2 SERPL-SCNC: 21 MMOL/L — SIGNIFICANT CHANGE UP (ref 17–32)
CREAT SERPL-MCNC: 0.9 MG/DL — SIGNIFICANT CHANGE UP (ref 0.7–1.5)
EOSINOPHIL # BLD AUTO: 0.07 K/UL — SIGNIFICANT CHANGE UP (ref 0–0.7)
EOSINOPHIL NFR BLD AUTO: 1.4 % — SIGNIFICANT CHANGE UP (ref 0–8)
GLUCOSE SERPL-MCNC: 175 MG/DL — HIGH (ref 70–99)
HCT VFR BLD CALC: 37.2 % — LOW (ref 42–52)
HGB BLD-MCNC: 12.4 G/DL — LOW (ref 14–18)
IMM GRANULOCYTES NFR BLD AUTO: 0.4 % — HIGH (ref 0.1–0.3)
LYMPHOCYTES # BLD AUTO: 1.58 K/UL — SIGNIFICANT CHANGE UP (ref 1.2–3.4)
LYMPHOCYTES # BLD AUTO: 30.5 % — SIGNIFICANT CHANGE UP (ref 20.5–51.1)
MAGNESIUM SERPL-MCNC: 2 MG/DL — SIGNIFICANT CHANGE UP (ref 1.8–2.4)
MCHC RBC-ENTMCNC: 28.6 PG — SIGNIFICANT CHANGE UP (ref 27–31)
MCHC RBC-ENTMCNC: 33.3 G/DL — SIGNIFICANT CHANGE UP (ref 32–37)
MCV RBC AUTO: 85.7 FL — SIGNIFICANT CHANGE UP (ref 80–94)
MONOCYTES # BLD AUTO: 0.5 K/UL — SIGNIFICANT CHANGE UP (ref 0.1–0.6)
MONOCYTES NFR BLD AUTO: 9.7 % — HIGH (ref 1.7–9.3)
NEUTROPHILS # BLD AUTO: 2.96 K/UL — SIGNIFICANT CHANGE UP (ref 1.4–6.5)
NEUTROPHILS NFR BLD AUTO: 57 % — SIGNIFICANT CHANGE UP (ref 42.2–75.2)
NRBC # BLD: 0 /100 WBCS — SIGNIFICANT CHANGE UP (ref 0–0)
PLATELET # BLD AUTO: 196 K/UL — SIGNIFICANT CHANGE UP (ref 130–400)
POTASSIUM SERPL-MCNC: 3.9 MMOL/L — SIGNIFICANT CHANGE UP (ref 3.5–5)
POTASSIUM SERPL-SCNC: 3.9 MMOL/L — SIGNIFICANT CHANGE UP (ref 3.5–5)
PROT SERPL-MCNC: 7.4 G/DL — SIGNIFICANT CHANGE UP (ref 6–8)
RBC # BLD: 4.34 M/UL — LOW (ref 4.7–6.1)
RBC # FLD: 13.3 % — SIGNIFICANT CHANGE UP (ref 11.5–14.5)
SODIUM SERPL-SCNC: 135 MMOL/L — SIGNIFICANT CHANGE UP (ref 135–146)
WBC # BLD: 5.18 K/UL — SIGNIFICANT CHANGE UP (ref 4.8–10.8)
WBC # FLD AUTO: 5.18 K/UL — SIGNIFICANT CHANGE UP (ref 4.8–10.8)

## 2021-06-09 PROCEDURE — 99222 1ST HOSP IP/OBS MODERATE 55: CPT

## 2021-06-09 RX ORDER — INDOMETHACIN 50 MG
25 CAPSULE ORAL EVERY 12 HOURS
Refills: 0 | Status: DISCONTINUED | OUTPATIENT
Start: 2021-06-09 | End: 2021-06-10

## 2021-06-09 RX ORDER — ACETAMINOPHEN 500 MG
975 TABLET ORAL EVERY 6 HOURS
Refills: 0 | Status: DISCONTINUED | OUTPATIENT
Start: 2021-06-09 | End: 2021-06-29

## 2021-06-09 RX ADMIN — ENOXAPARIN SODIUM 40 MILLIGRAM(S): 100 INJECTION SUBCUTANEOUS at 21:08

## 2021-06-09 RX ADMIN — PANTOPRAZOLE SODIUM 40 MILLIGRAM(S): 20 TABLET, DELAYED RELEASE ORAL at 06:27

## 2021-06-09 RX ADMIN — AMLODIPINE BESYLATE 2.5 MILLIGRAM(S): 2.5 TABLET ORAL at 06:27

## 2021-06-09 RX ADMIN — Medication 650 MILLIGRAM(S): at 01:15

## 2021-06-09 RX ADMIN — Medication 25 MILLIGRAM(S): at 20:46

## 2021-06-09 RX ADMIN — Medication 25 MILLIGRAM(S): at 14:24

## 2021-06-09 RX ADMIN — Medication 25 MILLIGRAM(S): at 15:15

## 2021-06-09 RX ADMIN — Medication 650 MILLIGRAM(S): at 02:27

## 2021-06-09 RX ADMIN — ATORVASTATIN CALCIUM 80 MILLIGRAM(S): 80 TABLET, FILM COATED ORAL at 21:08

## 2021-06-09 RX ADMIN — Medication 975 MILLIGRAM(S): at 14:22

## 2021-06-09 RX ADMIN — Medication 25 MILLIGRAM(S): at 21:30

## 2021-06-09 RX ADMIN — Medication 975 MILLIGRAM(S): at 11:42

## 2021-06-09 RX ADMIN — Medication 81 MILLIGRAM(S): at 11:42

## 2021-06-09 NOTE — PROGRESS NOTE ADULT - ATTENDING COMMENTS
I have personally seen and examined this patient.  I have fully participated in the care of this patient.  I have reviewed all pertinent clinical information, including history, physical exam, plan and note.   I have reviewed all pertinent clinical information and reviewed all relevant imaging and diagnostic studies personally.  Pt w/ recent stroke now with R HA/facial pain r/o GCA suspect cluster vs PHC/SUNCT.  Recommendations as above.  Agree with above assessment except as noted.

## 2021-06-09 NOTE — CHART NOTE - NSCHARTNOTEFT_GEN_A_CORE
GI are contacted for positive hep C which is an OP Rx  please get   hep C RNA PCR  and get the patient an appointment in liver clinic located at [address] [place] [address], phone nb [phone] GI are contacted for positive hep C which is an OP Rx  please get   hep C RNA PCR  and get the patient an appointment in liver clinic located at [address] [place] [address], phone nb [phone]  discussed with medical team

## 2021-06-09 NOTE — PROGRESS NOTE ADULT - SUBJECTIVE AND OBJECTIVE BOX
History of Present Illness:   55 year-old male with a PMHx of hyperlipidemia and hypertension (non-compliant with meds) who presented to ED on 6/3/21 with complaint of headache, weakness, decreased sensation on the Right side of his face, and dizziness since 1 AM. Patient states he felt perfectly fine when he went to bed on evening of 6/2/21. NIHSS 3 on arrival to ED. Out of window for tPA. CT Head without contrast negative for acute intracranial hemorrhage or large territorial infarct, with 1 cm dural-based lesion of the Left parietal convexity, likely reflecting a meningioma. 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 Patient admitted to Neurology service for management of clinically diagnosed CVA. Patient started on Aspirin and high-intensity statin. +Patent foramen ovale on TTE. Patient with apparent Right-sided vocal cord paralysis, initially kept NPO with NG tube in place. SLP evaluated patient with Modified Barium Swallow, and patient cleared for regular diet with thin liquids the day of transfer to Rehab.    The patient was evaluated by PT, and required contact guard assist-minimal assist with bed mobility, minimal-moderate assist with transfers, and moderate assist with ambulation 25 ft x2 (once with RW and then holding onto wall railing). Prior to admission, patient was fully independent in ADLs and ambulation without an assistive device. The patient was evaluated by the Physiatry team on the Neurology service and was found to be a good candidate for acute inpatient rehab.    Prior level of function: Patient states he was independent with ADLs and ambulation without an assistive device.    Current level of function: Contact guard assist-minimal assist with bed mobility, minimal-moderate assist with transfers, and moderate assist with ambulation 25 ft x2 (once with RW and then holding onto wall railing).      Review of Systems: Constitutional:    [ x ] WNL           [   ] poor appetite   [   ] insomnia   [   ] tired   Cardio:                [ x ] WNL           [   ] CP   [   ] ETIENNE   [   ] palpitations               Resp:                   [  ] WNL           [   ] SOB   [ x ] cough, intermittent, dry   [   ] wheezing   GI:                        [ x ] WNL           [   ] constipation   [   ] diarrhea   [   ] abdominal pain   [   ] nausea   [   ] emesis                                :                      [ x ] WNL           [   ] WESTBROOK  [   ] dysuria   [   ] difficulty voiding             Endo:                   [ x ] WNL          [   ] polyuria   [   ] temperature intolerance                 Skin:                     [ x ] WNL          [   ] pain   [   ] wound   [   ] rash   MSK:                    [ x ] WNL          [   ] muscle pain   [   ] joint pain/ stiffness   [   ] muscle tenderness   [   ] swelling   Neuro:                 [   ] WNL          [ x ] Right-sided HA, since admission   [   ] change in vision   [   ] tremor   [ x ] weakness   [   ]dysphagia              Cognitive:           [ x ] WNL           [   ]confusion      Psych:                  [ x ] WNL           [   ] hallucinations   [   ]agitation   [   ] delusion   [   ]depression    Physical Exam: PHYSICAL EXAMINATION     General:[ x ] NAD, Resting Comfortable,   [   ] other:                                HEENT: [ x ] NC/AT, EOMI, PERRL , Normal Conjunctivae,   [   ] other:  Cardio: [ x ] RRR, no murmur,   [   ] other:                              Pulm: [ x ] No Respiratory Distress,  Lungs CTAB,   [   ] other:                       Abdomen: [ x ]ND/NT, Soft,   [   ] other:    : [ x ] NO WESTBROOK CATHETER, [   ] WESTBROOK CATHETER- no meatal tear, no discharge, [   ] other:                                            MSK: [ x ] No joint swelling, Full ROM,   [   ] other:                                         Ext: [ x ]No C/C/E, No calf tenderness,   [   ]other:    Skin: [ x ]intact,   [   ] other:                                                                   Neurological Examination:  Cognitive: [ x ] AAO x 3,   [    ]  other:                                                                      Attention:  [ x ] intact,   [    ]  other:                            Memory: [ x ] intact,    [    ]  other:     Mood/Affect: [ x ] wnl,    [    ]  other:                                                                             Communication: [ x ]Fluent, no dysarthria, following commands:  [    ] other:   CN II - XII:  [  ] intact,  [   x ] other: decreased sensation of right side of face                                                                                        Motor:   RIGHT UE: [   ] WNL,  [ x ] other: 4/5 shoulder abduction, elbow flexion/extension, and hand   LEFT    UE: [   ] WNL,  [ x ] other: 4/5 shoulder abduction, elbow flexion/extension, and hand   RIGHT LE: [   ] WNL,  [ x ] other: 4/5 hip flexion, knee flexion/extension, ankle dorsiflexion/plantarflexion  LEFT    LE: [   ] WNL,  [ x ] other: 4/5 hip flexion, knee flexion/extension, ankle dorsiflexion/plantarflexion    Tone: [ x ] wnl,   [    ]  other:  DTRs: [ x ]symmetric, [   ] other:  Coordination:   [ x ] intact, with bilateral finger-to-nose   [    ] other:                                                                           Sensory: [  ] Intact to light touch,   [ x ] other: intact to light touch for bilateral upper and lower extremities, but decreased sensation over Right ophthalmic, maxillary, and mandibular areas      Vitals:  ============  T(F): 96.9 (09 Jun 2021 05:49), Max: 100.3 (08 Jun 2021 13:00)  HR: 80 (09 Jun 2021 05:49)  BP: 150/93 (09 Jun 2021 05:49)  RR: 18 (09 Jun 2021 05:49)  SpO2: 98% (08 Jun 2021 13:00) (98% - 98%)  temp max in last 48H T(F): , Max: 100.3 (06-08-21 @ 13:00)    =======================================================  Current Antibiotics:    Other medications:  amLODIPine   Tablet 2.5 milliGRAM(s) Oral daily  aspirin  chewable 81 milliGRAM(s) Oral daily  atorvastatin 80 milliGRAM(s) Oral at bedtime  enoxaparin Injectable 40 milliGRAM(s) SubCutaneous at bedtime  pantoprazole    Tablet 40 milliGRAM(s) Oral before breakfast      =======================================================  Labs:                        12.4   5.18  )-----------( 196      ( 09 Jun 2021 08:38 )             37.2     06-09    135  |  100  |  22<H>  ----------------------------<  175<H>  3.9   |  21  |  0.9    Ca    9.7      09 Jun 2021 08:38  Mg     2.0     06-09    TPro  7.4  /  Alb  4.6  /  TBili  0.3  /  DBili  x   /  AST  58<H>  /  ALT  51<H>  /  AlkPhos  114  06-09      Creatinine, Serum: 0.9 mg/dL (06-09-21 @ 08:38)  Creatinine, Serum: 1.0 mg/dL (06-07-21 @ 07:15)  Creatinine, Serum: 0.9 mg/dL (06-06-21 @ 05:59)  Creatinine, Serum: 1.0 mg/dL (06-05-21 @ 04:38)            WBC Count: 5.18 K/uL (06-09-21 @ 08:38)  WBC Count: 5.01 K/uL (06-07-21 @ 07:15)  WBC Count: 6.25 K/uL (06-06-21 @ 05:59)  WBC Count: 5.44 K/uL (06-05-21 @ 04:38)      COVID-19 PCR: NotDetec (06-07-21 @ 17:50)  COVID-19 PCR: NotDete (06-03-21 @ 08:01)      Alkaline Phosphatase, Serum: 114 U/L (06-09-21 @ 08:38)  Alkaline Phosphatase, Serum: 98 U/L (06-07-21 @ 07:15)  Alkaline Phosphatase, Serum: 104 U/L (06-06-21 @ 05:59)  Alanine Aminotransferase (ALT/SGPT): 51 U/L (06-09-21 @ 08:38)  Alanine Aminotransferase (ALT/SGPT): 39 U/L (06-07-21 @ 07:15)  Alanine Aminotransferase (ALT/SGPT): 37 U/L (06-06-21 @ 05:59)  Aspartate Aminotransferase (AST/SGOT): 58 U/L (06-09-21 @ 08:38)  Aspartate Aminotransferase (AST/SGOT): 51 U/L (06-07-21 @ 07:15)  Aspartate Aminotransferase (AST/SGOT): 44 U/L (06-06-21 @ 05:59)  Bilirubin Total, Serum: 0.3 mg/dL (06-09-21 @ 08:38)  Bilirubin Total, Serum: 0.5 mg/dL (06-07-21 @ 07:15)  Bilirubin Total, Serum: 0.4 mg/dL (06-06-21 @ 05:59)   Patient is a 55y old  Male who presents with a chief complaint of CVA (09 Jun 2021 10:47)      HPI:  55 year-old male with a PMHx of hyperlipidemia and hypertension (non-compliant with meds) who presented to ED on 6/3/21 with complaint of headache, weakness, decreased sensation on the Right side of his face, and dizziness since 1 AM. Patient states he felt perfectly fine when he went to bed on evening of 6/2/21. NIHSS 3 on arrival to ED. Out of window for tPA. CT Head without contrast negative for acute intracranial hemorrhage or large territorial infarct, with 1 cm dural-based lesion of the Left parietal convexity, likely reflecting a meningioma. 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 Patient admitted to Neurology service for management of clinically diagnosed CVA. Patient started on Aspirin and high-intensity statin. +Patent foramen ovale on TTE. Patient with apparent Right-sided vocal cord paralysis, initially kept NPO with NG tube in place. SLP evaluated patient with Modified Barium Swallow, and patient cleared for regular diet with thin liquids the day of transfer to Rehab.    The patient was evaluated by PT, and required contact guard assist-minimal assist with bed mobility, minimal-moderate assist with transfers, and moderate assist with ambulation 25 ft x2 (once with RW and then holding onto wall railing). Prior to admission, patient was fully independent in ADLs and ambulation without an assistive device. The patient was evaluated by the Physiatry team on the Neurology service and was found to be a good candidate for acute inpatient rehab.    Prior level of function: Patient states he was independent with ADLs and ambulation without an assistive device.  Current level of function: Contact guard assist-minimal assist with bed mobility, minimal-moderate assist with transfers, and moderate assist with ambulation 25 ft x2 (once with RW and then holding onto wall railing). (08 Jun 2021 15:51)      I examined the patient and reviewed the chart. There have been no significant changes since my history and physical except where documented below.    TODAY'S SUBJECTIVE & REVIEW OF SYMPTOMS:  patient seen and examined today complaining of R sided headache      Review of Systems: Constitutional:    [ x ] WNL           [   ] poor appetite   [   ] insomnia   [   ] tired   Cardio:                [ x ] WNL           [   ] CP   [   ] ETIENNE   [   ] palpitations               Resp:                   [  ] WNL           [   ] SOB   [ x ] cough, intermittent, dry   [   ] wheezing   GI:                        [ x ] WNL           [   ] constipation   [   ] diarrhea   [   ] abdominal pain   [   ] nausea   [   ] emesis                                :                      [ x ] WNL           [   ] WESTBROOK  [   ] dysuria   [   ] difficulty voiding             Endo:                   [ x ] WNL          [   ] polyuria   [   ] temperature intolerance                 Skin:                     [ x ] WNL          [   ] pain   [   ] wound   [   ] rash   MSK:                    [ x ] WNL          [   ] muscle pain   [   ] joint pain/ stiffness   [   ] muscle tenderness   [   ] swelling   Neuro:                 [   ] WNL          [ x ] Right-sided HA, since admission   [   ] change in vision   [   ] tremor   [ x ] weakness   [   ]dysphagia              Cognitive:           [ x ] WNL           [   ]confusion      Psych:                  [ x ] WNL           [   ] hallucinations   [   ]agitation   [   ] delusion   [   ]depression      PHYSICAL EXAM    General:[ x ] NAD, Resting Comfortable,   [   ] other:                                HEENT: [ x ] NC/AT, EOMI, PERRL , Normal Conjunctivae,   [   ] other:  Cardio: [ x ] RRR, no murmur,   [   ] other:                              Pulm: [ x ] No Respiratory Distress,  Lungs CTAB,   [   ] other:                       Abdomen: [ x ]ND/NT, Soft,   [   ] other:    : [ x ] NO WESTBROOK CATHETER, [   ] WESTBROOK CATHETER- no meatal tear, no discharge, [   ] other:                                            MSK: [ x ] No joint swelling, Full ROM,   [   ] other:                                         Ext: [ x ]No C/C/E, No calf tenderness,   [   ]other:    Skin: [ x ]intact,   [   ] other:                                                                   Neurological Examination:  Cognitive: [ x ] AAO x 3,   [    ]  other:                                                                      Attention:  [ x ] intact,   [    ]  other:                            Memory: [ x ] intact,    [    ]  other:     Mood/Affect: [ x ] wnl,    [    ]  other:                                                                             Communication: [ x ]Fluent, no dysarthria, following commands:  [    ] other:   CN II - XII:  [  ] intact,  [   x ] other: decreased sensation of right side of face                                                                                        Motor:   RIGHT UE: [   ] WNL,  [ x ] other: 4/5 shoulder abduction, elbow flexion/extension, and hand   LEFT    UE: [   ] WNL,  [ x ] other: 4/5 shoulder abduction, elbow flexion/extension, and hand   RIGHT LE: [   ] WNL,  [ x ] other: 4/5 hip flexion, knee flexion/extension, ankle dorsiflexion/plantarflexion  LEFT    LE: [   ] WNL,  [ x ] other: 4/5 hip flexion, knee flexion/extension, ankle dorsiflexion/plantarflexion    Tone: [ x ] wnl,   [    ]  other:  DTRs: [ x ]symmetric, [   ] other:  Coordination:   [ x ] intact, with bilateral finger-to-nose   [    ] other:                                                                           Sensory: [  ] Intact to light touch,   [ x ] other: intact to light touch for bilateral upper and lower extremities, but decreased sensation over Right ophthalmic, maxillary, and mandibular areas.        Vitals:  ============  T(F): 96.9 (09 Jun 2021 05:49), Max: 100.3 (08 Jun 2021 13:00)  HR: 80 (09 Jun 2021 05:49)  BP: 150/93 (09 Jun 2021 05:49)  RR: 18 (09 Jun 2021 05:49)  SpO2: 98% (08 Jun 2021 13:00) (98% - 98%)  temp max in last 48H T(F): , Max: 100.3 (06-08-21 @ 13:00)    =======================================================  Current Antibiotics:    Other medications:  amLODIPine   Tablet 2.5 milliGRAM(s) Oral daily  aspirin  chewable 81 milliGRAM(s) Oral daily  atorvastatin 80 milliGRAM(s) Oral at bedtime  enoxaparin Injectable 40 milliGRAM(s) SubCutaneous at bedtime  pantoprazole    Tablet 40 milliGRAM(s) Oral before breakfast      =======================================================  Labs:                        12.4   5.18  )-----------( 196      ( 09 Jun 2021 08:38 )             37.2     06-09    135  |  100  |  22<H>  ----------------------------<  175<H>  3.9   |  21  |  0.9    Ca    9.7      09 Jun 2021 08:38  Mg     2.0     06-09    TPro  7.4  /  Alb  4.6  /  TBili  0.3  /  DBili  x   /  AST  58<H>  /  ALT  51<H>  /  AlkPhos  114  06-09      Creatinine, Serum: 0.9 mg/dL (06-09-21 @ 08:38)  Creatinine, Serum: 1.0 mg/dL (06-07-21 @ 07:15)  Creatinine, Serum: 0.9 mg/dL (06-06-21 @ 05:59)  Creatinine, Serum: 1.0 mg/dL (06-05-21 @ 04:38)            WBC Count: 5.18 K/uL (06-09-21 @ 08:38)  WBC Count: 5.01 K/uL (06-07-21 @ 07:15)  WBC Count: 6.25 K/uL (06-06-21 @ 05:59)  WBC Count: 5.44 K/uL (06-05-21 @ 04:38)      COVID-19 PCR: NotDetec (06-07-21 @ 17:50)  COVID-19 PCR: NotDetec (06-03-21 @ 08:01)      Alkaline Phosphatase, Serum: 114 U/L (06-09-21 @ 08:38)  Alkaline Phosphatase, Serum: 98 U/L (06-07-21 @ 07:15)  Alkaline Phosphatase, Serum: 104 U/L (06-06-21 @ 05:59)  Alanine Aminotransferase (ALT/SGPT): 51 U/L (06-09-21 @ 08:38)  Alanine Aminotransferase (ALT/SGPT): 39 U/L (06-07-21 @ 07:15)  Alanine Aminotransferase (ALT/SGPT): 37 U/L (06-06-21 @ 05:59)  Aspartate Aminotransferase (AST/SGOT): 58 U/L (06-09-21 @ 08:38)  Aspartate Aminotransferase (AST/SGOT): 51 U/L (06-07-21 @ 07:15)  Aspartate Aminotransferase (AST/SGOT): 44 U/L (06-06-21 @ 05:59)  Bilirubin Total, Serum: 0.3 mg/dL (06-09-21 @ 08:38)  Bilirubin Total, Serum: 0.5 mg/dL (06-07-21 @ 07:15)  Bilirubin Total, Serum: 0.4 mg/dL (06-06-21 @ 05:59)

## 2021-06-09 NOTE — PROGRESS NOTE ADULT - ASSESSMENT
Rehab of clinically diagnosed CVA, right cerebellar penumbra, not seen on MRI, with impaired balance, unsteady gait, Right-sided facial numbness, and dysphagia. NIHSS 3. mRS 4.    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 Structural Cardio f/u as outpatient    #Hepatitis C Ab positive  - Hepatitis B Surface Ab and Core Ab positive, Hepatitis B Surface Ag negative    # Dysphagia  - ENT previously consulted, noted laryngeal Right-sided vocal cord paralysis  - s/p NGT  - MBSS performed, patient cleared for DASH/TLC regular consistency diet by SLP on 6/8/21    #Hyperlipidemia  - Atorvastatin 80 mg PO nightly    #Hypertension  - Amlodipine 2.5 mg PO daily    #Headache  - fu neurology      -Pain control: Tylenol PRN    -GI/Bowel Mgmt: Pantoprazole 40 mg PO daily before breakfast    -Bladder management: Patient continent of bladder     -Skin:  No active issues at this time    -FEN     - Diet: DASH/TLC, regular consistency solids with thin liquids     Precautions / PROPHYLAXIS:      - Falls    - Weight bearing status: WBAT      - DVT prophylaxis: Lovenox 40 mg SubQ daily

## 2021-06-09 NOTE — PROGRESS NOTE ADULT - ATTENDING COMMENTS
Patient seen and examined. We discussed the case. He has suffered a clinical CVA. He is having severe headaches with burning sensation of the right side of the face.  Neurology F/U is needed. He requires acute inpatient rehab with 3 hours of daily therapies. I edited the note. He will need to F/U with GI as an outpatient for hepatitis C.. Patient seen and examined. We discussed the case. He has suffered a clinical CVA. He is having severe headaches with burning sensation on the side of his right face.  Neurology F/U is needed. He requires acute inpatient rehab with 3 hours of daily therapies. I edited the note. He will need to F/U with GI as an outpatient for hepatitis C. I edited the note.

## 2021-06-09 NOTE — PROGRESS NOTE ADULT - ASSESSMENT
Impression:  55 year-old male with a PMHx of hyperlipidemia and hypertension (non-compliant with meds) who presented to ED on 6/3/21 with complaint of headache, weakness, decreased sensation on the Right side of his face, and dizziness since 1 AM. Treated for right brainstem stroke, called back for right sided headache.  On exam has tenderness on right temporal parietal area.      Suggestion:  ESR CRP  Non rebreather oxygen therapy when headache occurs if is not effective can do Endomethacin 25 mg Q12 PRN headache.

## 2021-06-09 NOTE — PROGRESS NOTE ADULT - SUBJECTIVE AND OBJECTIVE BOX
Neurology Progress Note    Interval History:    55 year-old male with a PMHx of hyperlipidemia and hypertension (non-compliant with meds) who presented to ED on 6/3/21 with complaint of headache, weakness, decreased sensation on the Right side of his face, and dizziness since 1 AM. Patient states he felt perfectly fine when he went to bed on evening of 6/2/21. NIHSS 3 on arrival to ED. Out of window for tPA. CT Head without contrast negative for acute intracranial hemorrhage or large territorial infarct, with 1 cm dural-based lesion of the Left parietal convexity, likely reflecting a meningioma. 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 Patient admitted to Neurology service for management of clinically diagnosed CVA. Patient started on Aspirin and high-intensity statin. +Patent foramen ovale on TTE. Patient with apparent Right-sided vocal cord paralysis, initially kept NPO with NG tube in place. SLP evaluated patient with Modified Barium Swallow, and patient cleared for regular diet with thin liquids the day of transfer to Rehab.    The patient was evaluated by PT, and required contact guard assist-minimal assist with bed mobility, minimal-moderate assist with transfers, and moderate assist with ambulation 25 ft x2 (once with RW and then holding onto wall railing). Prior to admission, patient was fully independent in ADLs and ambulation without an assistive device. The patient was evaluated by the Physiatry team on the Neurology service and was found to be a good candidate for acute inpatient rehab.    Prior level of function: Patient states he was independent with ADLs and ambulation without an assistive device.  Current level of function: Contact guard assist-minimal assist with bed mobility, minimal-moderate assist with transfers, and moderate assist with ambulation 25 ft x2 (once with RW and then holding onto wall railing). (08 Jun 2021 15:51)    Treated for right brainstem stroke, called back for right sided headache.        Vital Signs Last 24 Hrs  T(C): 36.1 (09 Jun 2021 05:49), Max: 37.9 (08 Jun 2021 13:00)  T(F): 96.9 (09 Jun 2021 05:49), Max: 100.3 (08 Jun 2021 13:00)  HR: 80 (09 Jun 2021 05:49) (80 - 103)  BP: 150/93 (09 Jun 2021 05:49) (130/83 - 150/93)  BP(mean): --  RR: 18 (09 Jun 2021 05:49) (18 - 18)  SpO2: 98% (08 Jun 2021 13:00) (98% - 98%)    Neurological Exam:   Mental status: Awake, alert and oriented x3.  Recent and remote memory intact.  Naming, repetition and comprehension intact.  Attention/concentration intact.  No dysarthria, no aphasia.  Fund of knowledge appropriate.    Cranial nerves: Pupils equally round and reactive to light, visual fields full, no nystagmus, extraocular muscles intact, V1 through V3 intact bilaterally and symmetric, face symmetric, hearing intact to finger rub, palate elevation symmetric, tongue was midline.  Motor:  MRC grading 5/5 b/l UE/LE.   strength 5/5.  Normal tone and bulk.  No abnormal movements.    Sensation: Intact to light touch, proprioception, and pinprick.   Coordination: RUE RLE ataxia  Right Periorbital and right temporal parietal pain intermittently    NIHSS 2  Medications:  MEDICATIONS  (STANDING):  amLODIPine   Tablet 2.5 milliGRAM(s) Oral daily  aspirin  chewable 81 milliGRAM(s) Oral daily  atorvastatin 80 milliGRAM(s) Oral at bedtime  enoxaparin Injectable 40 milliGRAM(s) SubCutaneous at bedtime  indomethacin 25 milliGRAM(s) Oral every 12 hours  pantoprazole    Tablet 40 milliGRAM(s) Oral before breakfast      Labs:  CBC Full  -  ( 09 Jun 2021 08:38 )  WBC Count : 5.18 K/uL  RBC Count : 4.34 M/uL  Hemoglobin : 12.4 g/dL  Hematocrit : 37.2 %  Platelet Count - Automated : 196 K/uL  Mean Cell Volume : 85.7 fL  Mean Cell Hemoglobin : 28.6 pg  Mean Cell Hemoglobin Concentration : 33.3 g/dL  Auto Neutrophil # : 2.96 K/uL  Auto Lymphocyte # : 1.58 K/uL  Auto Monocyte # : 0.50 K/uL  Auto Eosinophil # : 0.07 K/uL  Auto Basophil # : 0.05 K/uL  Auto Neutrophil % : 57.0 %  Auto Lymphocyte % : 30.5 %  Auto Monocyte % : 9.7 %  Auto Eosinophil % : 1.4 %  Auto Basophil % : 1.0 %    06-09    135  |  100  |  22<H>  ----------------------------<  175<H>  3.9   |  21  |  0.9    Ca    9.7      09 Jun 2021 08:38  Mg     2.0     06-09    TPro  7.4  /  Alb  4.6  /  TBili  0.3  /  DBili  x   /  AST  58<H>  /  ALT  51<H>  /  AlkPhos  114  06-09    LIVER FUNCTIONS - ( 09 Jun 2021 08:38 )  Alb: 4.6 g/dL / Pro: 7.4 g/dL / ALK PHOS: 114 U/L / ALT: 51 U/L / AST: 58 U/L / GGT: x

## 2021-06-10 LAB — ERYTHROCYTE [SEDIMENTATION RATE] IN BLOOD: 50 MM/HR — HIGH (ref 0–10)

## 2021-06-10 RX ORDER — INDOMETHACIN 50 MG
25 CAPSULE ORAL EVERY 12 HOURS
Refills: 0 | Status: DISCONTINUED | OUTPATIENT
Start: 2021-06-10 | End: 2021-06-29

## 2021-06-10 RX ADMIN — ATORVASTATIN CALCIUM 80 MILLIGRAM(S): 80 TABLET, FILM COATED ORAL at 23:01

## 2021-06-10 RX ADMIN — ENOXAPARIN SODIUM 40 MILLIGRAM(S): 100 INJECTION SUBCUTANEOUS at 23:02

## 2021-06-10 RX ADMIN — Medication 25 MILLIGRAM(S): at 17:28

## 2021-06-10 RX ADMIN — Medication 81 MILLIGRAM(S): at 11:03

## 2021-06-10 RX ADMIN — PANTOPRAZOLE SODIUM 40 MILLIGRAM(S): 20 TABLET, DELAYED RELEASE ORAL at 06:14

## 2021-06-10 RX ADMIN — Medication 975 MILLIGRAM(S): at 23:35

## 2021-06-10 RX ADMIN — Medication 25 MILLIGRAM(S): at 06:14

## 2021-06-10 RX ADMIN — AMLODIPINE BESYLATE 2.5 MILLIGRAM(S): 2.5 TABLET ORAL at 06:14

## 2021-06-10 NOTE — PROGRESS NOTE ADULT - ATTENDING COMMENTS
Patient seen and examined. We discussed the case. He has suffered a clinical CVA. He is having severe headaches with burning sensation on the side of his right face.  Neurology F/U is needed. He requires acute inpatient rehab with 3 hours of daily therapies. I edited the note. He will need to F/U with GI as an outpatient for hepatitis C. I edited the note. Patient seen and examined. We discussed the case. He has suffered a CVA. Neurology saw. He was having severe headaches; he is smiling today and headaches resolved. Indomethacin is helping.  Dr. Dow reviewed MRI and reads as the patient having a right pontine CVA which goes along with the patient's exam.  He has some weakness in closing the right eye and a subtle decreased nasolabial fold. He has ataxia. He requires continued acute inpatient rehab with 3 hours of daily therapies. He will need to F/U with GI as an outpatient for hepatitis C. He reports getting treatment for hepatis C about a year ago in [place]. I edited the note.

## 2021-06-10 NOTE — PROGRESS NOTE ADULT - ASSESSMENT
Rehab of clinically diagnosed CVA, right cerebellar penumbra, not seen on MRI by radiology, with impaired balance, unsteady gait, Right-sided facial numbness, and dysphagia. NIHSS 3. mRS 4.    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 Structural Cardio f/u as outpatient    #Hepatitis C Ab positive  - Hepatitis B Surface Ab and Core Ab positive, Hepatitis B Surface Ag negative  - viral load pending GI following  - patient states he was worked up in the past     # Dysphagia  - ENT previously consulted, noted laryngeal Right-sided vocal cord paralysis  - s/p NGT  - MBSS performed, patient cleared for DASH/TLC regular consistency diet by SLP on 6/8/21    #Hyperlipidemia  - Atorvastatin 80 mg PO nightly    #Hypertension  - Amlodipine 2.5 mg PO daily    #Headache  - as per neurology-on indomethacin and o2 for cluster headache. now improved      -Pain control: Tylenol PRN    -GI/Bowel Mgmt: Pantoprazole 40 mg PO daily before breakfast    -Bladder management: Patient continent of bladder     -Skin:  No active issues at this time    -FEN     - Diet: DASH/TLC, regular consistency solids with thin liquids     Precautions / PROPHYLAXIS:      - Falls    - Weight bearing status: WBAT      - DVT prophylaxis: Lovenox 40 mg SubQ daily     Rehab of clinically diagnosed CVA, right cerebellar penumbra, not seen on MRI by radiology, with impaired balance, unsteady gait, Right-sided facial numbness, and dysphagia. NIHSS 3. mRS 4. Neuro./Dr. Dow reviewed the MRI he sees a right pontine CVA.     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 Structural Cardio f/u as outpatient    #Hepatitis C Ab positive  - Hepatitis B Surface Ab and Core Ab positive, Hepatitis B Surface Ag negative  - viral load pending GI following  - patient states he was worked up in the past and received some treatment for this one year ago in [place]. He was told that he did well with the treatment he received.  Outpatient. F/U/     # Dysphagia  - ENT previously consulted, noted laryngeal Right-sided vocal cord paralysis  - s/p NGT  - MBSS performed, patient cleared for DASH/TLC regular consistency diet by SLP on 6/8/21    #Hyperlipidemia  - Atorvastatin 80 mg PO nightly    #Hypertension  - Amlodipine 2.5 mg PO daily    #Headache  - as per neurology-on indomethacin and O2 for cluster headache. He is smiling today; headache stable.      -Pain control: Tylenol PRN    -GI/Bowel Mgmt: Pantoprazole 40 mg PO daily before breakfast on Indomethacin    -Bladder management: Patient continent of bladder     -Skin:  No active issues at this time    -FEN     - Diet: DASH/TLC, regular consistency solids with thin liquids     Precautions / PROPHYLAXIS:      - Falls    - Weight bearing status: WBAT      - DVT prophylaxis: Lovenox 40 mg SubQ daily

## 2021-06-10 NOTE — PROGRESS NOTE ADULT - SUBJECTIVE AND OBJECTIVE BOX
Patient is a 55y old  Male who presents with a chief complaint of CVA (09 Jun 2021 10:47)      HPI:  55 year-old male with a PMHx of hyperlipidemia and hypertension (non-compliant with meds) who presented to ED on 6/3/21 with complaint of headache, weakness, decreased sensation on the Right side of his face, and dizziness since 1 AM. Patient states he felt perfectly fine when he went to bed on evening of 6/2/21. NIHSS 3 on arrival to ED. Out of window for tPA. CT Head without contrast negative for acute intracranial hemorrhage or large territorial infarct, with 1 cm dural-based lesion of the Left parietal convexity, likely reflecting a meningioma. 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 Patient admitted to Neurology service for management of clinically diagnosed CVA. Patient started on Aspirin and high-intensity statin. +Patent foramen ovale on TTE. Patient with apparent Right-sided vocal cord paralysis, initially kept NPO with NG tube in place. SLP evaluated patient with Modified Barium Swallow, and patient cleared for regular diet with thin liquids the day of transfer to Rehab.    The patient was evaluated by PT, and required contact guard assist-minimal assist with bed mobility, minimal-moderate assist with transfers, and moderate assist with ambulation 25 ft x2 (once with RW and then holding onto wall railing). Prior to admission, patient was fully independent in ADLs and ambulation without an assistive device. The patient was evaluated by the Physiatry team on the Neurology service and was found to be a good candidate for acute inpatient rehab.    Prior level of function: Patient states he was independent with ADLs and ambulation without an assistive device.  Current level of function: Contact guard assist-minimal assist with bed mobility, minimal-moderate assist with transfers, and moderate assist with ambulation 25 ft x2 (once with RW and then holding onto wall railing). (08 Jun 2021 15:51)      I examined the patient and reviewed the chart. There have been no significant changes since my history and physical except where documented below.    TODAY'S SUBJECTIVE & REVIEW OF SYMPTOMS:  patient seen and examined today complaining of R sided headache but much improved after starting indomethacin      Review of Systems: Constitutional:    [ x ] WNL           [   ] poor appetite   [   ] insomnia   [   ] tired   Cardio:                [ x ] WNL           [   ] CP   [   ] ETIENNE   [   ] palpitations               Resp:                   [  ] WNL           [   ] SOB   [ x ] cough, intermittent, dry   [   ] wheezing   GI:                        [ x ] WNL           [   ] constipation   [   ] diarrhea   [   ] abdominal pain   [   ] nausea   [   ] emesis                                :                      [ x ] WNL           [   ] WESTBROOK  [   ] dysuria   [   ] difficulty voiding             Endo:                   [ x ] WNL          [   ] polyuria   [   ] temperature intolerance                 Skin:                     [ x ] WNL          [   ] pain   [   ] wound   [   ] rash   MSK:                    [ x ] WNL          [   ] muscle pain   [   ] joint pain/ stiffness   [   ] muscle tenderness   [   ] swelling   Neuro:                 [   ] WNL          [ x ] Right-sided HA, since admission   [   ] change in vision   [   ] tremor   [ x ] weakness   [   ]dysphagia              Cognitive:           [ x ] WNL           [   ]confusion      Psych:                  [ x ] WNL           [   ] hallucinations   [   ]agitation   [   ] delusion   [   ]depression      PHYSICAL EXAM    General:[ x ] NAD, Resting Comfortable,   [   ] other:                                HEENT: [ x ] NC/AT, EOMI, PERRL , Normal Conjunctivae,   [   ] other:  Cardio: [ x ] RRR, no murmur,   [   ] other:                              Pulm: [ x ] No Respiratory Distress,  Lungs CTAB,   [   ] other:                       Abdomen: [ x ]ND/NT, Soft,   [   ] other:    : [ x ] NO WESTBROOK CATHETER, [   ] WESTBROOK CATHETER- no meatal tear, no discharge, [   ] other:                                            MSK: [ x ] No joint swelling, Full ROM,   [   ] other:                                         Ext: [ x ]No C/C/E, No calf tenderness,   [   ]other:    Skin: [ x ]intact,   [   ] other:                                                                   Neurological Examination:  Cognitive: [ x ] AAO x 3,   [    ]  other:                                                                      Attention:  [ x ] intact,   [    ]  other:                            Memory: [ x ] intact,    [    ]  other:     Mood/Affect: [ x ] wnl,    [    ]  other:                                                                             Communication: [ x ]Fluent, no dysarthria, following commands:  [    ] other:   CN II - XII:  [  ] intact,  [   x ] other: decreased sensation of right side of face, r sided facial droop                                                                                         Motor:   RIGHT UE: [   ] WNL,  [ x ] other: 4/5 shoulder abduction, elbow flexion/extension, and hand   LEFT    UE: [   ] WNL,  [ x ] other: 4/5 shoulder abduction, elbow flexion/extension, and hand   RIGHT LE: [   ] WNL,  [ x ] other: 4/5 hip flexion, knee flexion/extension, ankle dorsiflexion/plantarflexion  LEFT    LE: [   ] WNL,  [ x ] other: 4/5 hip flexion, knee flexion/extension, ankle dorsiflexion/plantarflexion    Tone: [ x ] wnl,   [    ]  other:  DTRs: [ x ]symmetric, [   ] other:  Coordination:   [ x ] intact, with bilateral finger-to-nose   [    ] other:                                                                                     Vitals:  ============  T(F): 98 (10 Varun 2021 05:33), Max: 98.7 (09 Jun 2021 20:27)  HR: 88 (09 Jun 2021 20:27)  BP: 139/83 (10 Varun 2021 05:33)  RR: 18 (10 Varun 2021 05:33)  SpO2: --  temp max in last 48H T(F): , Max: 100.3 (06-08-21 @ 13:00)    =======================================================  Current Antibiotics:    Other medications:  amLODIPine   Tablet 2.5 milliGRAM(s) Oral daily  aspirin  chewable 81 milliGRAM(s) Oral daily  atorvastatin 80 milliGRAM(s) Oral at bedtime  enoxaparin Injectable 40 milliGRAM(s) SubCutaneous at bedtime  indomethacin 25 milliGRAM(s) Oral every 12 hours  pantoprazole    Tablet 40 milliGRAM(s) Oral before breakfast      =======================================================  Labs:                        12.4   5.18  )-----------( 196      ( 09 Jun 2021 08:38 )             37.2     06-09    135  |  100  |  22<H>  ----------------------------<  175<H>  3.9   |  21  |  0.9    Ca    9.7      09 Jun 2021 08:38  Mg     2.0     06-09    TPro  7.4  /  Alb  4.6  /  TBili  0.3  /  DBili  x   /  AST  58<H>  /  ALT  51<H>  /  AlkPhos  114  06-09      Creatinine, Serum: 0.9 mg/dL (06-09-21 @ 08:38)  Creatinine, Serum: 1.0 mg/dL (06-07-21 @ 07:15)  Creatinine, Serum: 0.9 mg/dL (06-06-21 @ 05:59)            WBC Count: 5.18 K/uL (06-09-21 @ 08:38)  WBC Count: 5.01 K/uL (06-07-21 @ 07:15)  WBC Count: 6.25 K/uL (06-06-21 @ 05:59)      COVID-19 PCR: NotDetec (06-07-21 @ 17:50)  COVID-19 PCR: NotDetec (06-03-21 @ 08:01)      Alkaline Phosphatase, Serum: 114 U/L (06-09-21 @ 08:38)  Alkaline Phosphatase, Serum: 98 U/L (06-07-21 @ 07:15)  Alanine Aminotransferase (ALT/SGPT): 51 U/L (06-09-21 @ 08:38)  Alanine Aminotransferase (ALT/SGPT): 39 U/L (06-07-21 @ 07:15)  Aspartate Aminotransferase (AST/SGOT): 58 U/L (06-09-21 @ 08:38)  Aspartate Aminotransferase (AST/SGOT): 51 U/L (06-07-21 @ 07:15)  Bilirubin Total, Serum: 0.3 mg/dL (06-09-21 @ 08:38)  Bilirubin Total, Serum: 0.5 mg/dL (06-07-21 @ 07:15)  Sensory: [  ] Intact to light touch,   [ x ] other: intact to light touch for bilateral upper and lower extremities, but decreased sensation over Right ophthalmic, maxillary, and mandibular areas.                       Patient is a 55y old  Male who presents with a chief complaint of CVA (09 Jun 2021 10:47)      HPI:  55 year-old male with a PMHx of hyperlipidemia and hypertension (non-compliant with meds) who presented to ED on 6/3/21 with complaint of headache, weakness, decreased sensation on the Right side of his face, and dizziness since 1 AM. Patient states he felt perfectly fine when he went to bed on evening of 6/2/21. NIHSS 3 on arrival to ED. Out of window for tPA. CT Head without contrast negative for acute intracranial hemorrhage or large territorial infarct, with 1 cm dural-based lesion of the Left parietal convexity, likely reflecting a meningioma. 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 Patient admitted to Neurology service for management of clinically diagnosed CVA. Patient started on Aspirin and high-intensity statin. +Patent foramen ovale on TTE. Patient with apparent Right-sided vocal cord paralysis, initially kept NPO with NG tube in place. SLP evaluated patient with Modified Barium Swallow, and patient cleared for regular diet with thin liquids the day of transfer to Rehab.    The patient was evaluated by PT, and required contact guard assist-minimal assist with bed mobility, minimal-moderate assist with transfers, and moderate assist with ambulation 25 ft x2 (once with RW and then holding onto wall railing). Prior to admission, patient was fully independent in ADLs and ambulation without an assistive device. The patient was evaluated by the Physiatry team on the Neurology service and was found to be a good candidate for acute inpatient rehab.    Prior level of function: Patient states he was independent with ADLs and ambulation without an assistive device.  Current level of function: Contact guard assist-minimal assist with bed mobility, minimal-moderate assist with transfers, and moderate assist with ambulation 25 ft x2 (once with RW and then holding onto wall railing). (08 Jun 2021 15:51)      I examined the patient and reviewed the chart. There have been no significant changes since my history and physical except where documented below.    TODAY'S SUBJECTIVE & REVIEW OF SYMPTOMS:  patient seen and examined today complaining of R sided headache but much improved after starting indomethacin      Review of Systems: Constitutional:    [ x ] WNL           [   ] poor appetite   [   ] insomnia   [   ] tired   Cardio:                [ x ] WNL           [   ] CP   [   ] ETIENNE   [   ] palpitations               Resp:                   [  ] WNL           [   ] SOB   [ x ] cough, intermittent, dry   [   ] wheezing   GI:                        [ x ] WNL           [   ] constipation   [   ] diarrhea   [   ] abdominal pain   [   ] nausea   [   ] emesis                                :                      [ x ] WNL           [   ] WESTBROOK  [   ] dysuria   [   ] difficulty voiding             Endo:                   [ x ] WNL          [   ] polyuria   [   ] temperature intolerance                 Skin:                     [ x ] WNL          [   ] pain   [   ] wound   [   ] rash   MSK:                    [ x ] WNL          [   ] muscle pain   [   ] joint pain/ stiffness   [   ] muscle tenderness   [   ] swelling   Neuro:                 [   ] WNL          [ x ] Right-sided HA, better with new medication indomethacin   [   ] change in vision   [   ] tremor   [ x ] ataxia on right side more than weakness   [   ]dysphagia              Cognitive:           [ x ] WNL           [   ]confusion      Psych:                  [ x ] WNL           [   ] hallucinations   [   ]agitation   [   ] delusion   [   ]depression      PHYSICAL EXAM    General:[ x ] NAD, Resting Comfortable,   [   ] other:                                HEENT: [ x ] NC/AT, EOMI, PERRL , Normal Conjunctivae,   [   ] other:  Cardio: [ x ] RRR, no murmur,   [   ] other:                              Pulm: [ x ] No Respiratory Distress,  Lungs CTAB,   [   ] other:                       Abdomen: [ x ]ND/NT, Soft,   [   ] other:    : [ x ] NO WESTBROOK CATHETER, [   ] WESTBROOK CATHETER- no meatal tear, no discharge, [   ] other:                                            MSK: [ x ] No joint swelling, Full ROM,   [   ] other:                                         Ext: [ x ]No C/C/E, No calf tenderness,   [   ]other:    Skin: [ x ]intact,   [   ] other:                                                                   Neurological Examination:  Cognitive: [ x ] AAO x 3,   [    ]  other:                                                                      Attention:  [ x ] intact,   [    ]  other:                            Memory: [ x ] intact,    [    ]  other:     Mood/Affect: [ x ] wnl,    [    ]  other:                                                                             Communication: [ x ]Fluent, no dysarthria, following commands:  [    ] other:   CN II - XII:  [  ] intact,  [   x ] other: decreased sensation of right side of face, r sided facial droop; weakness of right eye closing                                                                                         Motor:   RIGHT UE: [   ] WNL,  [ x ] other: 4/5 shoulder abduction, elbow flexion/extension, and hand   LEFT    UE: [   ] WNL,  [ x ] other: 4/5 shoulder abduction, elbow flexion/extension, and hand   RIGHT LE: [   ] WNL,  [ x ] other: 4/5 hip flexion, knee flexion/extension, ankle dorsiflexion/plantarflexion  LEFT    LE: [   ] WNL,  [ x ] other: 4/5 hip flexion, knee flexion/extension, ankle dorsiflexion/plantarflexion    Tone: [ x ] wnl,   [    ]  other:  DTRs: [ x ]symmetric, [   ] other:  Coordination:   [ x ] intact, with bilateral finger-to-nose   [    ] other:                                                                                     Vitals:  ============  T(F): 98 (10 Varun 2021 05:33), Max: 98.7 (09 Jun 2021 20:27)  HR: 88 (09 Jun 2021 20:27)  BP: 139/83 (10 Varun 2021 05:33)  RR: 18 (10 Varun 2021 05:33)  SpO2: --  temp max in last 48H T(F): , Max: 100.3 (06-08-21 @ 13:00)    =======================================================  Current Antibiotics:    Other medications:  amLODIPine   Tablet 2.5 milliGRAM(s) Oral daily  aspirin  chewable 81 milliGRAM(s) Oral daily  atorvastatin 80 milliGRAM(s) Oral at bedtime  enoxaparin Injectable 40 milliGRAM(s) SubCutaneous at bedtime  indomethacin 25 milliGRAM(s) Oral every 12 hours  pantoprazole    Tablet 40 milliGRAM(s) Oral before breakfast      =======================================================  Labs:                        12.4   5.18  )-----------( 196      ( 09 Jun 2021 08:38 )             37.2     06-09    135  |  100  |  22<H>  ----------------------------<  175<H>  3.9   |  21  |  0.9    Ca    9.7      09 Jun 2021 08:38  Mg     2.0     06-09    TPro  7.4  /  Alb  4.6  /  TBili  0.3  /  DBili  x   /  AST  58<H>  /  ALT  51<H>  /  AlkPhos  114  06-09      Creatinine, Serum: 0.9 mg/dL (06-09-21 @ 08:38)  Creatinine, Serum: 1.0 mg/dL (06-07-21 @ 07:15)  Creatinine, Serum: 0.9 mg/dL (06-06-21 @ 05:59)            WBC Count: 5.18 K/uL (06-09-21 @ 08:38)  WBC Count: 5.01 K/uL (06-07-21 @ 07:15)  WBC Count: 6.25 K/uL (06-06-21 @ 05:59)      COVID-19 PCR: NotDetec (06-07-21 @ 17:50)  COVID-19 PCR: NotDetec (06-03-21 @ 08:01)      Alkaline Phosphatase, Serum: 114 U/L (06-09-21 @ 08:38)  Alkaline Phosphatase, Serum: 98 U/L (06-07-21 @ 07:15)  Alanine Aminotransferase (ALT/SGPT): 51 U/L (06-09-21 @ 08:38)  Alanine Aminotransferase (ALT/SGPT): 39 U/L (06-07-21 @ 07:15)  Aspartate Aminotransferase (AST/SGOT): 58 U/L (06-09-21 @ 08:38)  Aspartate Aminotransferase (AST/SGOT): 51 U/L (06-07-21 @ 07:15)  Bilirubin Total, Serum: 0.3 mg/dL (06-09-21 @ 08:38)  Bilirubin Total, Serum: 0.5 mg/dL (06-07-21 @ 07:15)  Sensory: [  ] Intact to light touch,   [ x ] other: intact to light touch for bilateral upper and lower extremities, but decreased sensation over Right ophthalmic, maxillary, and mandibular areas.

## 2021-06-11 LAB
CRP SERPL-MCNC: 8 MG/L — HIGH
DSDNA AB FLD-ACNC: <0.2 AI — SIGNIFICANT CHANGE UP
ENA SS-A AB FLD IA-ACNC: <0.2 AI — SIGNIFICANT CHANGE UP

## 2021-06-11 RX ADMIN — PANTOPRAZOLE SODIUM 40 MILLIGRAM(S): 20 TABLET, DELAYED RELEASE ORAL at 06:41

## 2021-06-11 RX ADMIN — Medication 975 MILLIGRAM(S): at 22:02

## 2021-06-11 RX ADMIN — Medication 25 MILLIGRAM(S): at 12:04

## 2021-06-11 RX ADMIN — Medication 975 MILLIGRAM(S): at 07:50

## 2021-06-11 RX ADMIN — AMLODIPINE BESYLATE 2.5 MILLIGRAM(S): 2.5 TABLET ORAL at 06:42

## 2021-06-11 RX ADMIN — Medication 975 MILLIGRAM(S): at 21:56

## 2021-06-11 RX ADMIN — Medication 81 MILLIGRAM(S): at 11:36

## 2021-06-11 RX ADMIN — Medication 975 MILLIGRAM(S): at 10:46

## 2021-06-11 RX ADMIN — Medication 25 MILLIGRAM(S): at 14:15

## 2021-06-11 RX ADMIN — ENOXAPARIN SODIUM 40 MILLIGRAM(S): 100 INJECTION SUBCUTANEOUS at 21:56

## 2021-06-11 RX ADMIN — ATORVASTATIN CALCIUM 80 MILLIGRAM(S): 80 TABLET, FILM COATED ORAL at 21:57

## 2021-06-11 NOTE — PROGRESS NOTE ADULT - ATTENDING COMMENTS
Patient seen and examined. We discussed the case. He has suffered a CVA. Neurology saw. He was having severe headaches; he is smiling today and headaches resolved. Indomethacin is helping.  Dr. Dow reviewed MRI and reads as the patient having a right pontine CVA which goes along with the patient's exam.  He has some weakness in closing the right eye and a subtle decreased nasolabial fold. He has ataxia. He requires continued acute inpatient rehab with 3 hours of daily therapies. He will need to F/U with GI as an outpatient for hepatitis C. He reports getting treatment for hepatis C about a year ago in [place]. I edited the note. Patient seen and examined. We discussed the case. He has suffered a pontine CVA. Neurology saw. He was having severe headaches - now improving on Indocin. Dr. Dow reviewed MRI and reads as the patient having a right pontine CVA which goes along with the patient's exam.  He has some weakness in closing the right eye and a subtle decreased nasolabial fold. He has ataxia. He requires continued acute inpatient rehab with 3 hours of daily therapies. He will need to F/U with GI as an outpatient for hepatitis C. He reports getting treatment for hepatis C about a year ago in [place]. I edited the note. He is medically stable with no new complaints. Continue rehab program.

## 2021-06-11 NOTE — PROGRESS NOTE ADULT - ASSESSMENT
Rehab of clinically diagnosed CVA, right cerebellar penumbra, not seen on MRI by radiology, with impaired balance, unsteady gait, Right-sided facial numbness, and dysphagia. NIHSS 3. mRS 4. Neuro./Dr. Dow reviewed the MRI he sees a right pontine CVA.     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 Structural Cardio f/u as outpatient    #Hepatitis C Ab positive  - Hepatitis B Surface Ab and Core Ab positive, Hepatitis B Surface Ag negative  - viral load pending GI following  - patient states he was worked up in the past and received some treatment for this one year ago in [place]. He was told that he did well with the treatment he received.  Outpatient. F/U/     # Dysphagia  - ENT previously consulted, noted laryngeal Right-sided vocal cord paralysis  - s/p NGT  - MBSS performed, patient cleared for DASH/TLC regular consistency diet by SLP on 6/8/21    #Hyperlipidemia  - Atorvastatin 80 mg PO nightly    #Hypertension  - Amlodipine 2.5 mg PO daily    #Headache  - as per neurology-on indomethacin and O2 for cluster headache. He is smiling today; headache stable.      -Pain control: Tylenol PRN    -GI/Bowel Mgmt: Pantoprazole 40 mg PO daily before breakfast on Indomethacin    -Bladder management: Patient continent of bladder     -Skin:  No active issues at this time    -FEN     - Diet: DASH/TLC, regular consistency solids with thin liquids     Precautions / PROPHYLAXIS:      - Falls    - Weight bearing status: WBAT      - DVT prophylaxis: Lovenox 40 mg SubQ daily

## 2021-06-11 NOTE — PROGRESS NOTE ADULT - SUBJECTIVE AND OBJECTIVE BOX
Patient is a 55y old  Male who presents with a chief complaint of CVA (09 Jun 2021 10:47)      HPI:  55 year-old male with a PMHx of hyperlipidemia and hypertension (non-compliant with meds) who presented to ED on 6/3/21 with complaint of headache, weakness, decreased sensation on the Right side of his face, and dizziness since 1 AM. Patient states he felt perfectly fine when he went to bed on evening of 6/2/21. NIHSS 3 on arrival to ED. Out of window for tPA. CT Head without contrast negative for acute intracranial hemorrhage or large territorial infarct, with 1 cm dural-based lesion of the Left parietal convexity, likely reflecting a meningioma. 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 Patient admitted to Neurology service for management of clinically diagnosed CVA. Patient started on Aspirin and high-intensity statin. +Patent foramen ovale on TTE. Patient with apparent Right-sided vocal cord paralysis, initially kept NPO with NG tube in place. SLP evaluated patient with Modified Barium Swallow, and patient cleared for regular diet with thin liquids the day of transfer to Rehab.    The patient was evaluated by PT, and required contact guard assist-minimal assist with bed mobility, minimal-moderate assist with transfers, and moderate assist with ambulation 25 ft x2 (once with RW and then holding onto wall railing). Prior to admission, patient was fully independent in ADLs and ambulation without an assistive device. The patient was evaluated by the Physiatry team on the Neurology service and was found to be a good candidate for acute inpatient rehab.    Prior level of function: Patient states he was independent with ADLs and ambulation without an assistive device.  Current level of function: Contact guard assist-minimal assist with bed mobility, minimal-moderate assist with transfers, and moderate assist with ambulation 25 ft x2 (once with RW and then holding onto wall railing). (08 Jun 2021 15:51)    Current level of function: Supervision-contact guard assist with bed mobility, min assist with transfers, moderate assistance to ambulate 20 ft x3 with RW (10 Britta 2021).    TODAY'S SUBJECTIVE & REVIEW OF SYMPTOMS:  Patient seen and examined today. Patient notes he feels well today. Patient's Right-sided headache much improved after starting indomethacin. No new complaints today.      Review of Systems: Constitutional:    [ x ] WNL           [   ] poor appetite   [   ] insomnia   [   ] tired   Cardio:                [ x ] WNL           [   ] CP   [   ] ETIENNE   [   ] palpitations               Resp:                   [  ] WNL           [   ] SOB   [ x ] cough, intermittent, dry   [   ] wheezing   GI:                        [ x ] WNL           [   ] constipation   [   ] diarrhea   [   ] abdominal pain   [   ] nausea   [   ] emesis                                :                      [ x ] WNL           [   ] WESTBROOK  [   ] dysuria   [   ] difficulty voiding             Endo:                   [ x ] WNL          [   ] polyuria   [   ] temperature intolerance                 Skin:                     [ x ] WNL          [   ] pain   [   ] wound   [   ] rash   MSK:                    [ x ] WNL          [   ] muscle pain   [   ] joint pain/ stiffness   [   ] muscle tenderness   [   ] swelling   Neuro:                 [   ] WNL          [ x ] Right-sided HA, better with new medication indomethacin   [   ] change in vision   [   ] tremor   [ x ] ataxia on right side more than weakness   [   ]dysphagia              Cognitive:           [ x ] WNL           [   ]confusion      Psych:                  [ x ] WNL           [   ] hallucinations   [   ]agitation   [   ] delusion   [   ]depression      PHYSICAL EXAM    Vital Signs Last 24 Hrs  T(C): 36.6 (11 Jun 2021 05:36), Max: 36.6 (11 Jun 2021 05:36)  T(F): 97.9 (11 Jun 2021 05:36), Max: 97.9 (11 Jun 2021 05:36)  HR: 80 (11 Jun 2021 05:36) (80 - 81)  BP: 128/80 (11 Jun 2021 05:36) (128/80 - 143/90)  BP(mean): --  RR: 20 (11 Jun 2021 05:36) (18 - 20)  SpO2: --    General:[ x ] NAD, Resting Comfortable,   [   ] other:                                HEENT: [ x ] NC/AT, EOMI, PERRL , Normal Conjunctivae,   [ x  ] other: vertical nystagmus  Cardio: [ x ] RRR, no murmur,   [   ] other:                              Pulm: [ x ] No Respiratory Distress,  Lungs CTAB,   [   ] other:                       Abdomen: [ x ]ND/NT, Soft,   [   ] other:    : [ x ] NO WESTBROOK CATHETER, [   ] WESTBROOK CATHETER- no meatal tear, no discharge, [   ] other:                                            MSK: [ x ] No joint swelling, Full ROM,   [   ] other:                                         Ext: [ x ]No C/C/E, No calf tenderness,   [   ]other:    Skin: [ x ]intact,   [   ] other:                                                                   Neurological Examination:  Cognitive: [ x ] AAO x 3,   [    ]  other:                                                                      Attention:  [ x ] intact,   [    ]  other:                            Memory: [ x ] intact,    [    ]  other:     Mood/Affect: [ x ] wnl,    [    ]  other:                                                                             Communication: [ x ]Fluent, no dysarthria, following commands:  [    ] other:   CN II - XII:  [  ] intact,  [   x ] other: decreased sensation of right side of face, r sided facial droop; weakness of right eye closing                                                                                         Motor:   RIGHT UE: [   ] WNL,  [ x ] other: 4/5 shoulder abduction, elbow flexion/extension, and hand   LEFT    UE: [   ] WNL,  [ x ] other: 4/5 shoulder abduction, elbow flexion/extension, and hand   RIGHT LE: [   ] WNL,  [ x ] other: 4/5 hip flexion, knee flexion/extension, ankle dorsiflexion/plantarflexion  LEFT    LE: [   ] WNL,  [ x ] other: 4/5 hip flexion, knee flexion/extension, ankle dorsiflexion/plantarflexion    Tone: [ x ] wnl,   [    ]  other:  DTRs: [ x ]symmetric, [   ] other:  Coordination:   [ x ] intact, with bilateral finger-to-nose   [    ] other:                                                                             MEDICATIONS  (STANDING):  amLODIPine   Tablet 2.5 milliGRAM(s) Oral daily  aspirin  chewable 81 milliGRAM(s) Oral daily  atorvastatin 80 milliGRAM(s) Oral at bedtime  enoxaparin Injectable 40 milliGRAM(s) SubCutaneous at bedtime  pantoprazole    Tablet 40 milliGRAM(s) Oral before breakfast    MEDICATIONS  (PRN):  acetaminophen   Tablet .. 975 milliGRAM(s) Oral every 6 hours PRN Temp greater or equal to 38C (100.4F), Mild Pain (1 - 3), Moderate Pain (4 - 6), Severe Pain (7 - 10)  aluminum hydroxide/magnesium hydroxide/simethicone Suspension 30 milliLiter(s) Oral every 4 hours PRN Dyspepsia  indomethacin 25 milliGRAM(s) Oral every 12 hours PRN cluster headaches      Labs:                        12.4   5.18  )-----------( 196      ( 09 Jun 2021 08:38 )             37.2     06-09    135  |  100  |  22<H>  ----------------------------<  175<H>  3.9   |  21  |  0.9    Ca    9.7      09 Jun 2021 08:38  Mg     2.0     06-09    TPro  7.4  /  Alb  4.6  /  TBili  0.3  /  DBili  x   /  AST  58<H>  /  ALT  51<H>  /  AlkPhos  114  06-09      Creatinine, Serum: 0.9 mg/dL (06-09-21 @ 08:38)  Creatinine, Serum: 1.0 mg/dL (06-07-21 @ 07:15)  Creatinine, Serum: 0.9 mg/dL (06-06-21 @ 05:59)            WBC Count: 5.18 K/uL (06-09-21 @ 08:38)  WBC Count: 5.01 K/uL (06-07-21 @ 07:15)  WBC Count: 6.25 K/uL (06-06-21 @ 05:59)      COVID-19 PCR: NotDetec (06-07-21 @ 17:50)  COVID-19 PCR: NotDetec (06-03-21 @ 08:01)      Alkaline Phosphatase, Serum: 114 U/L (06-09-21 @ 08:38)  Alkaline Phosphatase, Serum: 98 U/L (06-07-21 @ 07:15)  Alanine Aminotransferase (ALT/SGPT): 51 U/L (06-09-21 @ 08:38)  Alanine Aminotransferase (ALT/SGPT): 39 U/L (06-07-21 @ 07:15)  Aspartate Aminotransferase (AST/SGOT): 58 U/L (06-09-21 @ 08:38)  Aspartate Aminotransferase (AST/SGOT): 51 U/L (06-07-21 @ 07:15)  Bilirubin Total, Serum: 0.3 mg/dL (06-09-21 @ 08:38)  Bilirubin Total, Serum: 0.5 mg/dL (06-07-21 @ 07:15)  Sensory: [  ] Intact to light touch,   [ x ] other: intact to light touch for bilateral upper and lower extremities, but decreased sensation over Right ophthalmic, maxillary, and mandibular areas.                       Patient is a 55y old  Male who presents with a chief complaint of CVA (09 Jun 2021 10:47)      HPI:  55 year-old male with a PMHx of hyperlipidemia and hypertension (non-compliant with meds) who presented to ED on 6/3/21 with complaint of headache, weakness, decreased sensation on the Right side of his face, and dizziness since 1 AM. Patient states he felt perfectly fine when he went to bed on evening of 6/2/21. NIHSS 3 on arrival to ED. Out of window for tPA. CT Head without contrast negative for acute intracranial hemorrhage or large territorial infarct, with 1 cm dural-based lesion of the Left parietal convexity, likely reflecting a meningioma. 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 Patient admitted to Neurology service for management of clinically diagnosed CVA. Patient started on Aspirin and high-intensity statin. +Patent foramen ovale on TTE. Patient with apparent Right-sided vocal cord paralysis, initially kept NPO with NG tube in place. SLP evaluated patient with Modified Barium Swallow, and patient cleared for regular diet with thin liquids the day of transfer to Rehab.    The patient was evaluated by PT, and required contact guard assist-minimal assist with bed mobility, minimal-moderate assist with transfers, and moderate assist with ambulation 25 ft x2 (once with RW and then holding onto wall railing). Prior to admission, patient was fully independent in ADLs and ambulation without an assistive device. The patient was evaluated by the Physiatry team on the Neurology service and was found to be a good candidate for acute inpatient rehab.    Prior level of function: Patient states he was independent with ADLs and ambulation without an assistive device.  Current level of function: Contact guard assist-minimal assist with bed mobility, minimal-moderate assist with transfers, and moderate assist with ambulation 25 ft x2 (once with RW and then holding onto wall railing). (08 Jun 2021 15:51)    Current level of function: Supervision-contact guard assist with bed mobility, min assist with transfers, moderate assistance to ambulate 20 ft x3 with RW (10 Britta 2021).    TODAY'S SUBJECTIVE & REVIEW OF SYMPTOMS:  Patient seen and examined today. Patient notes he feels well today. Patient's Right-sided headache much improved after starting indomethacin. No new complaints today.      Review of Systems:   Constitutional:    [ x ] WNL           [   ] poor appetite   [   ] insomnia   [   ] tired   Cardio:                [ x ] WNL           [   ] CP   [   ] ETIENNE   [   ] palpitations               Resp:                   [  ] WNL           [   ] SOB   [ x ] cough, intermittent, dry   [   ] wheezing   GI:                        [ x ] WNL           [   ] constipation   [   ] diarrhea   [   ] abdominal pain   [   ] nausea   [   ] emesis                                :                      [ x ] WNL           [   ] [place]OOK  [   ] dysuria   [   ] difficulty voiding             Endo:                   [ x ] WNL          [   ] polyuria   [   ] temperature intolerance                 Skin:                     [ x ] WNL          [   ] pain   [   ] wound   [   ] rash   MSK:                    [ x ] WNL          [   ] muscle pain   [   ] joint pain/ stiffness   [   ] muscle tenderness   [   ] swelling   Neuro:                 [   ] WNL          [ x ] Right-sided HA, better with new medication indomethacin   [   ] change in vision   [   ] tremor   [ x ] ataxia on right side more than weakness   [   ]dysphagia              Cognitive:           [ x ] WNL           [   ]confusion      Psych:                  [ x ] WNL           [   ] hallucinations   [   ]agitation   [   ] delusion   [   ]depression      PHYSICAL EXAM    Vital Signs Last 24 Hrs  T(C): 36.6 (11 Jun 2021 05:36), Max: 36.6 (11 Jun 2021 05:36)  T(F): 97.9 (11 Jun 2021 05:36), Max: 97.9 (11 Jun 2021 05:36)  HR: 80 (11 Jun 2021 05:36) (80 - 81)  BP: 128/80 (11 Jun 2021 05:36) (128/80 - 143/90)  BP(mean): --  RR: 20 (11 Jun 2021 05:36) (18 - 20)  SpO2: --    General:[ x ] NAD, Resting Comfortable,   [   ] other:                                HEENT: [ x ] NC/AT, EOMI, PERRL , Normal Conjunctivae,   [ x  ] other: vertical nystagmus  Cardio: [ x ] RRR, no murmur,   [   ] other:                              Pulm: [ x ] No Respiratory Distress,  Lungs CTAB,   [   ] other:                       Abdomen: [ x ]ND/NT, Soft,   [   ] other:    : [ x ] NO WESTBROOK CATHETER, [   ] WESTBROOK CATHETER- no meatal tear, no discharge, [   ] other:                                            MSK: [ x ] No joint swelling, Full ROM,   [   ] other:                                         Ext: [ x ]No C/C/E, No calf tenderness,   [   ]other:    Skin: [ x ]intact,   [   ] other:                                                                   Neurological Examination:  Cognitive: [ x ] AAO x 3,   [    ]  other:                                                                      Attention:  [ x ] intact,   [    ]  other:                            Memory: [ x ] intact,    [    ]  other:     Mood/Affect: [ x ] wnl,    [    ]  other:                                                                             Communication: [ x ]Fluent, no dysarthria, following commands:  [    ] other:   CN II - XII:  [  ] intact,  [   x ] other: decreased sensation of right side of face, r sided facial droop; weakness of right eye closing                                                                                         Motor:   RIGHT UE: [   ] WNL,  [ x ] other: 4/5 shoulder abduction, elbow flexion/extension, and hand   LEFT    UE: [   ] WNL,  [ x ] other: 4/5 shoulder abduction, elbow flexion/extension, and hand   RIGHT LE: [   ] WNL,  [ x ] other: 4/5 hip flexion, knee flexion/extension, ankle dorsiflexion/plantarflexion  LEFT    LE: [   ] WNL,  [ x ] other: 4/5 hip flexion, knee flexion/extension, ankle dorsiflexion/plantarflexion    Tone: [ x ] wnl,   [    ]  other:  DTRs: [ x ]symmetric, [   ] other:  Coordination:   [ x ] intact, with bilateral finger-to-nose   [    ] other:                                                                             MEDICATIONS  (STANDING):  amLODIPine   Tablet 2.5 milliGRAM(s) Oral daily  aspirin  chewable 81 milliGRAM(s) Oral daily  atorvastatin 80 milliGRAM(s) Oral at bedtime  enoxaparin Injectable 40 milliGRAM(s) SubCutaneous at bedtime  pantoprazole    Tablet 40 milliGRAM(s) Oral before breakfast    MEDICATIONS  (PRN):  acetaminophen   Tablet .. 975 milliGRAM(s) Oral every 6 hours PRN Temp greater or equal to 38C (100.4F), Mild Pain (1 - 3), Moderate Pain (4 - 6), Severe Pain (7 - 10)  aluminum hydroxide/magnesium hydroxide/simethicone Suspension 30 milliLiter(s) Oral every 4 hours PRN Dyspepsia  indomethacin 25 milliGRAM(s) Oral every 12 hours PRN cluster headaches      Labs:                        12.4   5.18  )-----------( 196      ( 09 Jun 2021 08:38 )             37.2     06-09    135  |  100  |  22<H>  ----------------------------<  175<H>  3.9   |  21  |  0.9    Ca    9.7      09 Jun 2021 08:38  Mg     2.0     06-09    TPro  7.4  /  Alb  4.6  /  TBili  0.3  /  DBili  x   /  AST  58<H>  /  ALT  51<H>  /  AlkPhos  114  06-09      Creatinine, Serum: 0.9 mg/dL (06-09-21 @ 08:38)  Creatinine, Serum: 1.0 mg/dL (06-07-21 @ 07:15)  Creatinine, Serum: 0.9 mg/dL (06-06-21 @ 05:59)            WBC Count: 5.18 K/uL (06-09-21 @ 08:38)  WBC Count: 5.01 K/uL (06-07-21 @ 07:15)  WBC Count: 6.25 K/uL (06-06-21 @ 05:59)      COVID-19 PCR: NotDetec (06-07-21 @ 17:50)  COVID-19 PCR: NotDetec (06-03-21 @ 08:01)      Alkaline Phosphatase, Serum: 114 U/L (06-09-21 @ 08:38)  Alkaline Phosphatase, Serum: 98 U/L (06-07-21 @ 07:15)  Alanine Aminotransferase (ALT/SGPT): 51 U/L (06-09-21 @ 08:38)  Alanine Aminotransferase (ALT/SGPT): 39 U/L (06-07-21 @ 07:15)  Aspartate Aminotransferase (AST/SGOT): 58 U/L (06-09-21 @ 08:38)  Aspartate Aminotransferase (AST/SGOT): 51 U/L (06-07-21 @ 07:15)  Bilirubin Total, Serum: 0.3 mg/dL (06-09-21 @ 08:38)  Bilirubin Total, Serum: 0.5 mg/dL (06-07-21 @ 07:15)  Sensory: [  ] Intact to light touch,   [ x ] other: intact to light touch for bilateral upper and lower extremities, but decreased sensation over Right ophthalmic, maxillary, and mandibular areas.

## 2021-06-12 RX ADMIN — Medication 975 MILLIGRAM(S): at 08:12

## 2021-06-12 RX ADMIN — PANTOPRAZOLE SODIUM 40 MILLIGRAM(S): 20 TABLET, DELAYED RELEASE ORAL at 06:13

## 2021-06-12 RX ADMIN — Medication 975 MILLIGRAM(S): at 22:16

## 2021-06-12 RX ADMIN — AMLODIPINE BESYLATE 2.5 MILLIGRAM(S): 2.5 TABLET ORAL at 06:13

## 2021-06-12 RX ADMIN — Medication 975 MILLIGRAM(S): at 07:38

## 2021-06-12 RX ADMIN — Medication 975 MILLIGRAM(S): at 22:46

## 2021-06-12 RX ADMIN — Medication 81 MILLIGRAM(S): at 11:18

## 2021-06-12 RX ADMIN — ATORVASTATIN CALCIUM 80 MILLIGRAM(S): 80 TABLET, FILM COATED ORAL at 21:35

## 2021-06-12 RX ADMIN — Medication 25 MILLIGRAM(S): at 11:18

## 2021-06-12 RX ADMIN — ENOXAPARIN SODIUM 40 MILLIGRAM(S): 100 INJECTION SUBCUTANEOUS at 21:35

## 2021-06-12 NOTE — PROGRESS NOTE ADULT - SUBJECTIVE AND OBJECTIVE BOX
Patient is a 55y old  Male who presents with a chief complaint of CVA (09 Jun 2021 10:47)      HPI:  55 year-old male with a PMHx of hyperlipidemia and hypertension (non-compliant with meds) who presented to ED on 6/3/21 with complaint of headache, weakness, decreased sensation on the Right side of his face, and dizziness since 1 AM. Patient states he felt perfectly fine when he went to bed on evening of 6/2/21. NIHSS 3 on arrival to ED. Out of window for tPA. CT Head without contrast negative for acute intracranial hemorrhage or large territorial infarct, with 1 cm dural-based lesion of the Left parietal convexity, likely reflecting a meningioma. 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 Patient admitted to Neurology service for management of clinically diagnosed CVA. Patient started on Aspirin and high-intensity statin. +Patent foramen ovale on TTE. Patient with apparent Right-sided vocal cord paralysis, initially kept NPO with NG tube in place. SLP evaluated patient with Modified Barium Swallow, and patient cleared for regular diet with thin liquids the day of transfer to Rehab.    The patient was evaluated by PT, and required contact guard assist-minimal assist with bed mobility, minimal-moderate assist with transfers, and moderate assist with ambulation 25 ft x2 (once with RW and then holding onto wall railing). Prior to admission, patient was fully independent in ADLs and ambulation without an assistive device. The patient was evaluated by the Physiatry team on the Neurology service and was found to be a good candidate for acute inpatient rehab.    Prior level of function: Patient states he was independent with ADLs and ambulation without an assistive device.  Current level of function: Contact guard assist-minimal assist with bed mobility, minimal-moderate assist with transfers, and moderate assist with ambulation 25 ft x2 (once with RW and then holding onto wall railing). (08 Jun 2021 15:51)    Current level of function: Supervision-contact guard assist with bed mobility, min assist with transfers, moderate assistance to ambulate 20 ft x3 with RW (10 Britta 2021).    TODAY'S SUBJECTIVE & REVIEW OF SYMPTOMS:  Patient seen and examined at bedside this morning with Dr. Balbuena. Patient states he feels well today. Patient's Right-sided headache much improved after starting indomethacin. No new complaints today.      Review of Systems:   Constitutional:    [ x ] WNL           [   ] poor appetite   [   ] insomnia   [   ] tired   Cardio:                [ x ] WNL           [   ] CP   [   ] ETIENNE   [   ] palpitations               Resp:                   [  ] WNL           [   ] SOB   [ x ] cough, intermittent, dry   [   ] wheezing   GI:                        [ x ] WNL           [   ] constipation   [   ] diarrhea   [   ] abdominal pain   [   ] nausea   [   ] emesis                                :                      [ x ] WNL           [   ] WESTBROOK  [   ] dysuria   [   ] difficulty voiding             Endo:                   [ x ] WNL          [   ] polyuria   [   ] temperature intolerance                 Skin:                     [ x ] WNL          [   ] pain   [   ] wound   [   ] rash   MSK:                    [ x ] WNL          [   ] muscle pain   [   ] joint pain/ stiffness   [   ] muscle tenderness   [   ] swelling   Neuro:                 [   ] WNL          [ x ] Right-sided HA, better with new medication indomethacin   [   ] change in vision   [   ] tremor   [ x ] ataxia on right side more than weakness   [   ]dysphagia              Cognitive:           [ x ] WNL           [   ]confusion      Psych:                  [ x ] WNL           [   ] hallucinations   [   ]agitation   [   ] delusion   [   ]depression      PHYSICAL EXAM    Vital Signs Last 24 Hrs  T(C): 36.9 (12 Jun 2021 05:13), Max: 36.9 (12 Jun 2021 05:13)  T(F): 98.5 (12 Jun 2021 05:13), Max: 98.5 (12 Jun 2021 05:13)  HR: 77 (12 Jun 2021 05:13) (77 - 89)  BP: 159/86 (12 Jun 2021 05:13) (138/83 - 159/86)  BP(mean): --  RR: 20 (12 Jun 2021 05:13) (18 - 20)  SpO2: 98% (12 Jun 2021 05:13) (98% - 98%)    General:[ x ] NAD, Resting Comfortable,   [   ] other:                                HEENT: [ x ] NC/AT, EOMI, PERRL , Normal Conjunctivae,   [ x  ] other: vertical nystagmus  Cardio: [ x ] RRR, no murmur,   [   ] other:                              Pulm: [ x ] No Respiratory Distress,  Lungs CTAB,   [   ] other:                       Abdomen: [ x ]ND/NT, Soft,   [   ] other:    : [ x ] NO WESTBROOK CATHETER, [   ] WESTBROOK CATHETER- no meatal tear, no discharge, [   ] other:                                            MSK: [ x ] No joint swelling, Full ROM,   [   ] other:                                         Ext: [ x ]No C/C/E, No calf tenderness,   [   ]other:    Skin: [ x ]intact,   [   ] other:                                                                   Neurological Examination:  Cognitive: [ x ] AAO x 3,   [    ]  other:                                                                      Attention:  [ x ] intact,   [    ]  other:                            Memory: [ x ] intact,    [    ]  other:     Mood/Affect: [ x ] wnl,    [    ]  other:                                                                             Communication: [ x ]Fluent, no dysarthria, following commands:  [    ] other:   CN II - XII:  [  ] intact,  [   x ] other: decreased sensation of right side of face, r sided facial droop; weakness of right eye closing                                                                                         Motor:   RIGHT UE: [   ] WNL,  [ x ] other: 4/5 shoulder abduction, elbow flexion/extension, and hand   LEFT    UE: [   ] WNL,  [ x ] other: 4/5 shoulder abduction, elbow flexion/extension, and hand   RIGHT LE: [   ] WNL,  [ x ] other: 4/5 hip flexion, knee flexion/extension, ankle dorsiflexion/plantarflexion  LEFT    LE: [   ] WNL,  [ x ] other: 4/5 hip flexion, knee flexion/extension, ankle dorsiflexion/plantarflexion    Tone: [ x ] wnl,   [    ]  other:  DTRs: [ x ]symmetric, [   ] other:  Coordination:   [ x ] intact, with bilateral finger-to-nose   [    ] other:                                                                             MEDICATIONS  (STANDING):  amLODIPine   Tablet 2.5 milliGRAM(s) Oral daily  aspirin  chewable 81 milliGRAM(s) Oral daily  atorvastatin 80 milliGRAM(s) Oral at bedtime  enoxaparin Injectable 40 milliGRAM(s) SubCutaneous at bedtime  pantoprazole    Tablet 40 milliGRAM(s) Oral before breakfast    MEDICATIONS  (PRN):  acetaminophen   Tablet .. 975 milliGRAM(s) Oral every 6 hours PRN Temp greater or equal to 38C (100.4F), Mild Pain (1 - 3), Moderate Pain (4 - 6), Severe Pain (7 - 10)  aluminum hydroxide/magnesium hydroxide/simethicone Suspension 30 milliLiter(s) Oral every 4 hours PRN Dyspepsia  indomethacin 25 milliGRAM(s) Oral every 12 hours PRN cluster headaches      Labs:                        12.4   5.18  )-----------( 196      ( 09 Jun 2021 08:38 )             37.2     06-09    135  |  100  |  22<H>  ----------------------------<  175<H>  3.9   |  21  |  0.9    Ca    9.7      09 Jun 2021 08:38  Mg     2.0     06-09    TPro  7.4  /  Alb  4.6  /  TBili  0.3  /  DBili  x   /  AST  58<H>  /  ALT  51<H>  /  AlkPhos  114  06-09      Creatinine, Serum: 0.9 mg/dL (06-09-21 @ 08:38)  Creatinine, Serum: 1.0 mg/dL (06-07-21 @ 07:15)  Creatinine, Serum: 0.9 mg/dL (06-06-21 @ 05:59)            WBC Count: 5.18 K/uL (06-09-21 @ 08:38)  WBC Count: 5.01 K/uL (06-07-21 @ 07:15)  WBC Count: 6.25 K/uL (06-06-21 @ 05:59)      COVID-19 PCR: NotDetec (06-07-21 @ 17:50)  COVID-19 PCR: NotDetec (06-03-21 @ 08:01)      Alkaline Phosphatase, Serum: 114 U/L (06-09-21 @ 08:38)  Alkaline Phosphatase, Serum: 98 U/L (06-07-21 @ 07:15)  Alanine Aminotransferase (ALT/SGPT): 51 U/L (06-09-21 @ 08:38)  Alanine Aminotransferase (ALT/SGPT): 39 U/L (06-07-21 @ 07:15)  Aspartate Aminotransferase (AST/SGOT): 58 U/L (06-09-21 @ 08:38)  Aspartate Aminotransferase (AST/SGOT): 51 U/L (06-07-21 @ 07:15)  Bilirubin Total, Serum: 0.3 mg/dL (06-09-21 @ 08:38)  Bilirubin Total, Serum: 0.5 mg/dL (06-07-21 @ 07:15)  Sensory: [  ] Intact to light touch,   [ x ] other: intact to light touch for bilateral upper and lower extremities, but decreased sensation over Right ophthalmic, maxillary, and mandibular areas.

## 2021-06-12 NOTE — PROGRESS NOTE ADULT - ATTENDING COMMENTS
Patient seen and examined. We discussed the case. I have directed the headache. He has suffered a pontine CVA. He is improving. His headache is better. He finds that the indomethacin and O2 prn are helpful. He warrants acute rehab with 3 hours of daily therapies. I edited the note.

## 2021-06-12 NOTE — PROGRESS NOTE ADULT - ASSESSMENT
Rehab of clinically diagnosed CVA, right cerebellar penumbra, not seen on MRI by radiology, with impaired balance, unsteady gait, Right-sided facial numbness, and dysphagia. NIHSS 3. mRS 4. Neuro./Dr. Dow reviewed the MRI he sees a right pontine CVA.     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 Structural Cardio f/u as outpatient    #Hepatitis C Ab positive  - Hepatitis B Surface Ab and Core Ab positive, Hepatitis B Surface Ag negative  - viral load pending GI following  - patient states he was worked up in the past and received some treatment for this one year ago in [place]. He was told that he did well with the treatment he received.  Outpatient. F/U/     # Dysphagia  - ENT previously consulted, noted laryngeal Right-sided vocal cord paralysis  - s/p NGT  - MBSS performed, patient cleared for DASH/TLC regular consistency diet by SLP on 6/8/21    #Hyperlipidemia  - Atorvastatin 80 mg PO nightly    #Hypertension  - Amlodipine 2.5 mg PO daily    #Headache  - as per neurology-on indomethacin and O2 for cluster headache. He is smiling today; headache stable.      -Pain control: Tylenol PRN    -GI/Bowel Mgmt: Pantoprazole 40 mg PO daily before breakfast on Indomethacin    -Bladder management: Patient continent of bladder     -Skin:  No active issues at this time    -FEN     - Diet: DASH/TLC, regular consistency solids with thin liquids     Precautions / PROPHYLAXIS:      - Falls    - Weight bearing status: WBAT      - DVT prophylaxis: Lovenox 40 mg SubQ daily     Rehab of clinically diagnosed CVA, right cerebellar penumbra, not seen on MRI by radiology but seen by neurologist, with impaired balance, unsteady gait, Right-sided facial numbness, and dysphagia. NIHSS 3. mRS 4. Neuro./Dr. Dow reviewed the MRI he sees a right pontine CVA.     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 Structural Cardio f/u as outpatient    #Hepatitis C Ab positive  - Hepatitis B Surface Ab and Core Ab positive, Hepatitis B Surface Ag negative  - viral load pending GI following  - patient states he was worked up in the past and received some treatment for this one year ago in [place]. He was told that he did well with the treatment he received.  Outpatient. F/U     # Dysphagia  - ENT previously consulted, noted laryngeal Right-sided vocal cord paralysis  - s/p NGT  - MBSS performed, patient cleared for DASH/TLC regular consistency diet by SLP on 6/8/21    #Hyperlipidemia  - Atorvastatin 80 mg PO nightly    #Hypertension  - Amlodipine 2.5 mg PO daily    #Headache  - as per neurology-on indomethacin and O2 for cluster headache. He is smiling today; headache stable.      -Pain control: Tylenol PRN    -GI/Bowel Mgmt: Pantoprazole 40 mg PO daily before breakfast on Indomethacin    -Bladder management: Patient continent of bladder     -Skin:  No active issues at this time    -FEN     - Diet: DASH/TLC, regular consistency solids with thin liquids     Precautions / PROPHYLAXIS:      - Falls    - Weight bearing status: WBAT      - DVT prophylaxis: Lovenox 40 mg SubQ daily

## 2021-06-13 LAB
ANA TITR SER: NEGATIVE — SIGNIFICANT CHANGE UP
COVID-19 SPIKE DOMAIN AB INTERP: POSITIVE
COVID-19 SPIKE DOMAIN ANTIBODY RESULT: >250 U/ML — HIGH
SARS-COV-2 IGG+IGM SERPL QL IA: >250 U/ML — HIGH
SARS-COV-2 IGG+IGM SERPL QL IA: POSITIVE

## 2021-06-13 RX ADMIN — Medication 975 MILLIGRAM(S): at 22:19

## 2021-06-13 RX ADMIN — ENOXAPARIN SODIUM 40 MILLIGRAM(S): 100 INJECTION SUBCUTANEOUS at 22:11

## 2021-06-13 RX ADMIN — Medication 975 MILLIGRAM(S): at 08:57

## 2021-06-13 RX ADMIN — Medication 975 MILLIGRAM(S): at 23:28

## 2021-06-13 RX ADMIN — PANTOPRAZOLE SODIUM 40 MILLIGRAM(S): 20 TABLET, DELAYED RELEASE ORAL at 05:49

## 2021-06-13 RX ADMIN — Medication 81 MILLIGRAM(S): at 11:58

## 2021-06-13 RX ADMIN — AMLODIPINE BESYLATE 2.5 MILLIGRAM(S): 2.5 TABLET ORAL at 05:47

## 2021-06-13 RX ADMIN — ATORVASTATIN CALCIUM 80 MILLIGRAM(S): 80 TABLET, FILM COATED ORAL at 22:11

## 2021-06-13 RX ADMIN — Medication 975 MILLIGRAM(S): at 07:17

## 2021-06-13 NOTE — PROGRESS NOTE ADULT - SUBJECTIVE AND OBJECTIVE BOX
T(C): 36.6 (06-13-21 @ 05:16), Max: 36.8 (06-12-21 @ 12:54)  HR: 81 (06-13-21 @ 05:16) (81 - 86)  BP: 134/65 (06-13-21 @ 05:16) (134/65 - 154/101)  RR: 19 (06-13-21 @ 05:16) (18 - 19)  SpO2: --      Patient was stable overnight and expresses no new complaints     PE: ptn  seen and  exam  oob  in  the  rehab  gym  aox3  doing  very  well     Alert   LUNGS- clear  COR- RRR  ABD- SOFT, NT  EXTR- w/o edema  NEURO- stabl  Assess:  Rehab of cva 55  y.o m        Continue acute rehab program.and  current care  dw  rehab  resident

## 2021-06-14 DIAGNOSIS — J38.01 PARALYSIS OF VOCAL CORDS AND LARYNX, UNILATERAL: ICD-10-CM

## 2021-06-14 DIAGNOSIS — Q21.1 ATRIAL SEPTAL DEFECT: ICD-10-CM

## 2021-06-14 DIAGNOSIS — R51.9 HEADACHE, UNSPECIFIED: ICD-10-CM

## 2021-06-14 DIAGNOSIS — R13.10 DYSPHAGIA, UNSPECIFIED: ICD-10-CM

## 2021-06-14 DIAGNOSIS — G51.0 BELL'S PALSY: ICD-10-CM

## 2021-06-14 DIAGNOSIS — E78.5 HYPERLIPIDEMIA, UNSPECIFIED: ICD-10-CM

## 2021-06-14 DIAGNOSIS — Z91.14 PATIENT'S OTHER NONCOMPLIANCE WITH MEDICATION REGIMEN: ICD-10-CM

## 2021-06-14 DIAGNOSIS — G81.91 HEMIPLEGIA, UNSPECIFIED AFFECTING RIGHT DOMINANT SIDE: ICD-10-CM

## 2021-06-14 DIAGNOSIS — R26.2 DIFFICULTY IN WALKING, NOT ELSEWHERE CLASSIFIED: ICD-10-CM

## 2021-06-14 DIAGNOSIS — R29.704 NIHSS SCORE 4: ICD-10-CM

## 2021-06-14 DIAGNOSIS — I10 ESSENTIAL (PRIMARY) HYPERTENSION: ICD-10-CM

## 2021-06-14 DIAGNOSIS — I63.81 OTHER CEREBRAL INFARCTION DUE TO OCCLUSION OR STENOSIS OF SMALL ARTERY: ICD-10-CM

## 2021-06-14 LAB
ALBUMIN SERPL ELPH-MCNC: 4.1 G/DL — SIGNIFICANT CHANGE UP (ref 3.5–5.2)
ALP SERPL-CCNC: 112 U/L — SIGNIFICANT CHANGE UP (ref 30–115)
ALT FLD-CCNC: 89 U/L — HIGH (ref 0–41)
ANION GAP SERPL CALC-SCNC: 13 MMOL/L — SIGNIFICANT CHANGE UP (ref 7–14)
AST SERPL-CCNC: 69 U/L — HIGH (ref 0–41)
B BURGDOR C6 AB SER-ACNC: NEGATIVE — SIGNIFICANT CHANGE UP
B BURGDOR IGG+IGM SER-ACNC: 0.03 INDEX — SIGNIFICANT CHANGE UP (ref 0.01–0.89)
BASOPHILS # BLD AUTO: 0.04 K/UL — SIGNIFICANT CHANGE UP (ref 0–0.2)
BASOPHILS NFR BLD AUTO: 0.7 % — SIGNIFICANT CHANGE UP (ref 0–1)
BILIRUB SERPL-MCNC: <0.2 MG/DL — SIGNIFICANT CHANGE UP (ref 0.2–1.2)
BUN SERPL-MCNC: 15 MG/DL — SIGNIFICANT CHANGE UP (ref 10–20)
CALCIUM SERPL-MCNC: 9.4 MG/DL — SIGNIFICANT CHANGE UP (ref 8.5–10.1)
CHLORIDE SERPL-SCNC: 104 MMOL/L — SIGNIFICANT CHANGE UP (ref 98–110)
CO2 SERPL-SCNC: 24 MMOL/L — SIGNIFICANT CHANGE UP (ref 17–32)
CREAT SERPL-MCNC: 0.9 MG/DL — SIGNIFICANT CHANGE UP (ref 0.7–1.5)
EOSINOPHIL # BLD AUTO: 0.08 K/UL — SIGNIFICANT CHANGE UP (ref 0–0.7)
EOSINOPHIL NFR BLD AUTO: 1.5 % — SIGNIFICANT CHANGE UP (ref 0–8)
GLUCOSE SERPL-MCNC: 89 MG/DL — SIGNIFICANT CHANGE UP (ref 70–99)
HCT VFR BLD CALC: 34.5 % — LOW (ref 42–52)
HGB BLD-MCNC: 11.5 G/DL — LOW (ref 14–18)
IMM GRANULOCYTES NFR BLD AUTO: 0.5 % — HIGH (ref 0.1–0.3)
LYMPHOCYTES # BLD AUTO: 2.08 K/UL — SIGNIFICANT CHANGE UP (ref 1.2–3.4)
LYMPHOCYTES # BLD AUTO: 38 % — SIGNIFICANT CHANGE UP (ref 20.5–51.1)
MAGNESIUM SERPL-MCNC: 2 MG/DL — SIGNIFICANT CHANGE UP (ref 1.8–2.4)
MCHC RBC-ENTMCNC: 29 PG — SIGNIFICANT CHANGE UP (ref 27–31)
MCHC RBC-ENTMCNC: 33.3 G/DL — SIGNIFICANT CHANGE UP (ref 32–37)
MCV RBC AUTO: 87.1 FL — SIGNIFICANT CHANGE UP (ref 80–94)
MONOCYTES # BLD AUTO: 0.56 K/UL — SIGNIFICANT CHANGE UP (ref 0.1–0.6)
MONOCYTES NFR BLD AUTO: 10.2 % — HIGH (ref 1.7–9.3)
NEUTROPHILS # BLD AUTO: 2.68 K/UL — SIGNIFICANT CHANGE UP (ref 1.4–6.5)
NEUTROPHILS NFR BLD AUTO: 49.1 % — SIGNIFICANT CHANGE UP (ref 42.2–75.2)
NRBC # BLD: 0 /100 WBCS — SIGNIFICANT CHANGE UP (ref 0–0)
PLATELET # BLD AUTO: 268 K/UL — SIGNIFICANT CHANGE UP (ref 130–400)
POTASSIUM SERPL-MCNC: 4.2 MMOL/L — SIGNIFICANT CHANGE UP (ref 3.5–5)
POTASSIUM SERPL-SCNC: 4.2 MMOL/L — SIGNIFICANT CHANGE UP (ref 3.5–5)
PROT SERPL-MCNC: 6.7 G/DL — SIGNIFICANT CHANGE UP (ref 6–8)
RBC # BLD: 3.96 M/UL — LOW (ref 4.7–6.1)
RBC # FLD: 13.3 % — SIGNIFICANT CHANGE UP (ref 11.5–14.5)
SODIUM SERPL-SCNC: 141 MMOL/L — SIGNIFICANT CHANGE UP (ref 135–146)
WBC # BLD: 5.47 K/UL — SIGNIFICANT CHANGE UP (ref 4.8–10.8)
WBC # FLD AUTO: 5.47 K/UL — SIGNIFICANT CHANGE UP (ref 4.8–10.8)

## 2021-06-14 RX ADMIN — Medication 975 MILLIGRAM(S): at 08:05

## 2021-06-14 RX ADMIN — Medication 975 MILLIGRAM(S): at 09:00

## 2021-06-14 RX ADMIN — Medication 81 MILLIGRAM(S): at 12:24

## 2021-06-14 RX ADMIN — AMLODIPINE BESYLATE 2.5 MILLIGRAM(S): 2.5 TABLET ORAL at 05:42

## 2021-06-14 RX ADMIN — Medication 975 MILLIGRAM(S): at 23:21

## 2021-06-14 RX ADMIN — ATORVASTATIN CALCIUM 80 MILLIGRAM(S): 80 TABLET, FILM COATED ORAL at 23:13

## 2021-06-14 RX ADMIN — PANTOPRAZOLE SODIUM 40 MILLIGRAM(S): 20 TABLET, DELAYED RELEASE ORAL at 05:42

## 2021-06-14 RX ADMIN — ENOXAPARIN SODIUM 40 MILLIGRAM(S): 100 INJECTION SUBCUTANEOUS at 23:14

## 2021-06-14 NOTE — PROGRESS NOTE ADULT - SUBJECTIVE AND OBJECTIVE BOX
Patient is a 55y old  Male who presents with a chief complaint of CVA (09 Jun 2021 10:47)      HPI:  55 year-old male with a PMHx of hyperlipidemia and hypertension (non-compliant with meds) who presented to ED on 6/3/21 with complaint of headache, weakness, decreased sensation on the Right side of his face, and dizziness since 1 AM. Patient states he felt perfectly fine when he went to bed on evening of 6/2/21. NIHSS 3 on arrival to ED. Out of window for tPA. CT Head without contrast negative for acute intracranial hemorrhage or large territorial infarct, with 1 cm dural-based lesion of the Left parietal convexity, likely reflecting a meningioma. 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 Patient admitted to Neurology service for management of clinically diagnosed CVA. Patient started on Aspirin and high-intensity statin. +Patent foramen ovale on TTE. Patient with apparent Right-sided vocal cord paralysis, initially kept NPO with NG tube in place. SLP evaluated patient with Modified Barium Swallow, and patient cleared for regular diet with thin liquids the day of transfer to Rehab.    The patient was evaluated by PT, and required contact guard assist-minimal assist with bed mobility, minimal-moderate assist with transfers, and moderate assist with ambulation 25 ft x2 (once with RW and then holding onto wall railing). Prior to admission, patient was fully independent in ADLs and ambulation without an assistive device. The patient was evaluated by the Physiatry team on the Neurology service and was found to be a good candidate for acute inpatient rehab.    Prior level of function: Patient states he was independent with ADLs and ambulation without an assistive device.  Current level of function: Contact guard assist-minimal assist with bed mobility, minimal-moderate assist with transfers, and moderate assist with ambulation 25 ft x2 (once with RW and then holding onto wall railing). (08 Jun 2021 15:51)    Current level of function: Supervision-contact guard assist with bed mobility, min assist with transfers, moderate assistance to ambulate 30 ft x2 with RW (11 June 2021).    TODAY'S SUBJECTIVE & REVIEW OF SYMPTOMS:  Patient seen and examined at bedside this morning with Dr. Balbuena. Patient states he feels well today. Patient's Right-sided headache much improved after starting indomethacin. No new complaints today.      Review of Systems:   Constitutional:    [ x ] WNL           [   ] poor appetite   [   ] insomnia   [   ] tired   Cardio:                [ x ] WNL           [   ] CP   [   ] ETIENNE   [   ] palpitations               Resp:                   [  ] WNL           [   ] SOB   [ x ] cough, intermittent, dry   [   ] wheezing   GI:                        [ x ] WNL           [   ] constipation   [   ] diarrhea   [   ] abdominal pain   [   ] nausea   [   ] emesis                                :                      [ x ] WNL           [   ] WESTBROOK  [   ] dysuria   [   ] difficulty voiding             Endo:                   [ x ] WNL          [   ] polyuria   [   ] temperature intolerance                 Skin:                     [ x ] WNL          [   ] pain   [   ] wound   [   ] rash   MSK:                    [ x ] WNL          [   ] muscle pain   [   ] joint pain/ stiffness   [   ] muscle tenderness   [   ] swelling   Neuro:                 [   ] WNL          [ x ] Right-sided HA, better with new medication indomethacin   [   ] change in vision   [   ] tremor   [ x ] ataxia on right side more than weakness   [   ]dysphagia              Cognitive:           [ x ] WNL           [   ]confusion      Psych:                  [ x ] WNL           [   ] hallucinations   [   ]agitation   [   ] delusion   [   ]depression      PHYSICAL EXAM    Vital Signs Last 24 Hrs  T(C): 36.4 (14 Jun 2021 05:00), Max: 37.2 (13 Jun 2021 21:04)  T(F): 97.5 (14 Jun 2021 05:00), Max: 99 (13 Jun 2021 21:04)  HR: 78 (14 Jun 2021 05:00) (78 - 87)  BP: 125/78 (14 Jun 2021 05:00) (125/78 - 157/86)  BP(mean): --  RR: 19 (14 Jun 2021 05:00) (18 - 20)  SpO2: --    General:[ x ] NAD, Resting Comfortable,   [   ] other:                                HEENT: [ x ] NC/AT, EOMI, PERRL , Normal Conjunctivae,   [ x  ] other: vertical nystagmus  Cardio: [ x ] RRR, no murmur,   [   ] other:                              Pulm: [ x ] No Respiratory Distress,  Lungs CTAB,   [   ] other:                       Abdomen: [ x ]ND/NT, Soft,   [   ] other:    : [ x ] NO WESTBROOK CATHETER, [   ] WESTBROOK CATHETER- no meatal tear, no discharge, [   ] other:                                            MSK: [ x ] No joint swelling, Full ROM,   [   ] other:                                         Ext: [ x ]No C/C/E, No calf tenderness,   [   ]other:    Skin: [ x ]intact,   [   ] other:                                                                   Neurological Examination:  Cognitive: [ x ] AAO x 3,   [    ]  other:                                                                      Attention:  [ x ] intact,   [    ]  other:                            Memory: [ x ] intact,    [    ]  other:     Mood/Affect: [ x ] wnl,    [    ]  other:                                                                             Communication: [ x ]Fluent, no dysarthria, following commands:  [    ] other:   CN II - XII:  [  ] intact,  [   x ] other: decreased sensation of right side of face, r sided facial droop; weakness of right eye closing                                                                                         Motor:   RIGHT UE: [   ] WNL,  [ x ] other: 4/5 shoulder abduction, elbow flexion/extension, and hand   LEFT    UE: [   ] WNL,  [ x ] other: 4/5 shoulder abduction, elbow flexion/extension, and hand   RIGHT LE: [   ] WNL,  [ x ] other: 4/5 hip flexion, knee flexion/extension, ankle dorsiflexion/plantarflexion  LEFT    LE: [   ] WNL,  [ x ] other: 4/5 hip flexion, knee flexion/extension, ankle dorsiflexion/plantarflexion    Tone: [ x ] wnl,   [    ]  other:  DTRs: [ x ]symmetric, [   ] other:  Coordination:   [ x ] intact, with bilateral finger-to-nose   [    ] other:                                                                           Sensory: [  ] Intact to light touch,   [ x ] other: intact to light touch for bilateral upper and lower extremities, but decreased sensation over Right ophthalmic, maxillary, and mandibular areas.    MEDICATIONS  (STANDING):  amLODIPine   Tablet 2.5 milliGRAM(s) Oral daily  aspirin  chewable 81 milliGRAM(s) Oral daily  atorvastatin 80 milliGRAM(s) Oral at bedtime  enoxaparin Injectable 40 milliGRAM(s) SubCutaneous at bedtime  pantoprazole    Tablet 40 milliGRAM(s) Oral before breakfast    MEDICATIONS  (PRN):  acetaminophen   Tablet .. 975 milliGRAM(s) Oral every 6 hours PRN Temp greater or equal to 38C (100.4F), Mild Pain (1 - 3), Moderate Pain (4 - 6), Severe Pain (7 - 10)  aluminum hydroxide/magnesium hydroxide/simethicone Suspension 30 milliLiter(s) Oral every 4 hours PRN Dyspepsia  indomethacin 25 milliGRAM(s) Oral every 12 hours PRN cluster headaches      Labs:                        11.5   5.47  )-----------( 268      ( 14 Jun 2021 05:54 )             34.5     06-14    141  |  104  |  15  ----------------------------<  89  4.2   |  24  |  0.9    Ca    9.4      14 Jun 2021 05:54  Mg     2.0     06-14    TPro  6.7  /  Alb  4.1  /  TBili  <0.2  /  DBili  x   /  AST  69<H>  /  ALT  89<H>  /  AlkPhos  112  06-14

## 2021-06-14 NOTE — PROGRESS NOTE ADULT - ASSESSMENT
Rehab of clinically diagnosed CVA, right cerebellar penumbra, not seen on MRI by radiology but seen by neurologist, with impaired balance, unsteady gait, Right-sided facial numbness, and dysphagia. NIHSS 3. mRS 4. Neuro./Dr. Dow reviewed the MRI he sees a right pontine CVA.     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 Structural Cardio f/u as outpatient    #Hepatitis C Ab positive  - Hepatitis B Surface Ab and Core Ab positive, Hepatitis B Surface Ag negative  - viral load pending GI following  - patient states he was worked up in the past and received some treatment for this one year ago in [place]. He was told that he did well with the treatment he received.  Outpatient. F/U     # Dysphagia  - ENT previously consulted, noted laryngeal Right-sided vocal cord paralysis  - s/p NGT  - MBSS performed, patient cleared for DASH/TLC regular consistency diet by SLP on 6/8/21    #Hyperlipidemia  - Atorvastatin 80 mg PO nightly    #Hypertension  - Amlodipine 2.5 mg PO daily    #Headache  - as per neurology-on indomethacin and O2 for cluster headache. He is smiling today; headache stable.      -Pain control: Tylenol PRN    -GI/Bowel Mgmt: Pantoprazole 40 mg PO daily before breakfast on Indomethacin    -Bladder management: Patient continent of bladder     -Skin:  No active issues at this time    -FEN     - Diet: DASH/TLC, regular consistency solids with thin liquids     Precautions / PROPHYLAXIS:      - Falls    - Weight bearing status: WBAT      - DVT prophylaxis: Lovenox 40 mg SubQ daily

## 2021-06-14 NOTE — PROGRESS NOTE ADULT - ATTENDING COMMENTS
Patient seen and examined. We discussed the case. I have directed the headache. He has suffered a pontine CVA. He is improving. His headache is better. He finds that the indomethacin and O2 prn are helpful. He warrants acute rehab with 3 hours of daily therapies. I edited the note. Patient seen and examined with the resident. We discussed the case. I have directed the care. Improving in therapies. He has suffered a pontine CVA. He is improving. His headache is better. He finds that the indomethacin and O2 prn are helpful. He warrants acute rehab with 3 hours of daily therapies. I edited the note.

## 2021-06-15 RX ADMIN — ENOXAPARIN SODIUM 40 MILLIGRAM(S): 100 INJECTION SUBCUTANEOUS at 21:39

## 2021-06-15 RX ADMIN — AMLODIPINE BESYLATE 2.5 MILLIGRAM(S): 2.5 TABLET ORAL at 06:13

## 2021-06-15 RX ADMIN — Medication 81 MILLIGRAM(S): at 15:25

## 2021-06-15 RX ADMIN — Medication 975 MILLIGRAM(S): at 09:00

## 2021-06-15 RX ADMIN — ATORVASTATIN CALCIUM 80 MILLIGRAM(S): 80 TABLET, FILM COATED ORAL at 21:39

## 2021-06-15 RX ADMIN — PANTOPRAZOLE SODIUM 40 MILLIGRAM(S): 20 TABLET, DELAYED RELEASE ORAL at 06:13

## 2021-06-15 RX ADMIN — Medication 25 MILLIGRAM(S): at 21:43

## 2021-06-15 RX ADMIN — Medication 975 MILLIGRAM(S): at 08:39

## 2021-06-15 NOTE — PROGRESS NOTE ADULT - ASSESSMENT
Rehab of clinically diagnosed CVA, right cerebellar penumbra, not seen on MRI by radiology but seen by neurologist, with impaired balance, unsteady gait, Right-sided facial numbness, and dysphagia. NIHSS 3. mRS 4. Neuro./Dr. oDw reviewed the MRI he sees a right pontine CVA.     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 Structural Cardio f/u as outpatient    #Hepatitis C Ab positive  - Hepatitis B Surface Ab and Core Ab positive, Hepatitis B Surface Ag negative  - viral load pending GI following  - patient states he was worked up in the past and received some treatment for this one year ago in [place]. He was told that he did well with the treatment he received.  Outpatient. F/U     # Dysphagia  - ENT previously consulted, noted laryngeal Right-sided vocal cord paralysis  - s/p NGT  - MBSS performed, patient cleared for DASH/TLC regular consistency diet by SLP on 6/8/21    #Hyperlipidemia  - Atorvastatin 80 mg PO nightly    #Hypertension  - Amlodipine 2.5 mg PO daily    #Headache  - as per neurology-on indomethacin and O2 for cluster headache. He is smiling today; headache stable.      -Pain control: Tylenol PRN    -GI/Bowel Mgmt: Pantoprazole 40 mg PO daily before breakfast on Indomethacin    -Bladder management: Patient continent of bladder     -Skin:  No active issues at this time    -FEN     - Diet: DASH/TLC, regular consistency solids with thin liquids     Precautions / PROPHYLAXIS:      - Falls    - Weight bearing status: WBAT      - DVT prophylaxis: Lovenox 40 mg SubQ daily     Rehab of clinically diagnosed CVA, right cerebellar penumbra, not seen on MRI by radiology but seen by neurologist, with impaired balance, unsteady gait, Right-sided facial numbness, and dysphagia. NIHSS 3. mRS 4. Neuro./Dr. Dow reviewed the MRI he sees a right pontine CVA.     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 Structural Cardio f/u as outpatient    #Hepatitis C Ab positive  - Hepatitis B Surface Ab and Core Ab positive, Hepatitis B Surface Ag negative  - viral load pending GI following  - patient states he was worked up in the past and received some treatment for this one year ago in [place]. He was told that he did well with the treatment he received.  Outpatient. F/U     # Dysphagia  - ENT previously consulted, noted laryngeal Right-sided vocal cord paralysis  - s/p NGT  - MBSS performed, patient cleared for DASH/TLC regular consistency diet by SLP on 6/8/21    #Hyperlipidemia  - Atorvastatin 80 mg PO nightly    #Hypertension  - Amlodipine 2.5 mg PO daily    #Headache  - as per neurology-on indomethacin and O2 for cluster headache. Headache stable.      -Pain control: Tylenol PRN    -GI/Bowel Mgmt: Pantoprazole 40 mg PO daily before breakfast on Indomethacin    -Bladder management: Patient continent of bladder     -Skin:  No active issues at this time    -FEN     - Diet: DASH/TLC, regular consistency solids with thin liquids     Precautions / PROPHYLAXIS:      - Falls    - Weight bearing status: WBAT      - DVT prophylaxis: Lovenox 40 mg SubQ daily

## 2021-06-15 NOTE — PROGRESS NOTE ADULT - ATTENDING COMMENTS
Patient seen and examined with the resident. We discussed the case. I have directed the care. Improving in therapies. He has suffered a pontine CVA. He is improving. His headache is better. He finds that the indomethacin and O2 prn are helpful. He warrants acute rehab with 3 hours of daily therapies. I edited the note. Patient seen and examined with the resident. We discussed the case. I have directed the care. He is improving in therapies. He lives in a rectory. He has suffered a pontine CVA. He has incoordination of the right upper extremity and lower extremity. He is improving. His headache is better; he finds that the indomethacin is helpful. He warrants acute rehab with 3 hours of daily therapies. I edited the note.

## 2021-06-15 NOTE — PROGRESS NOTE ADULT - SUBJECTIVE AND OBJECTIVE BOX
Patient is a 55y old  Male who presents with a chief complaint of CVA (09 Jun 2021 10:47)      HPI:  55 year-old male with a PMHx of hyperlipidemia and hypertension (non-compliant with meds) who presented to ED on 6/3/21 with complaint of headache, weakness, decreased sensation on the Right side of his face, and dizziness since 1 AM. Patient states he felt perfectly fine when he went to bed on evening of 6/2/21. NIHSS 3 on arrival to ED. Out of window for tPA. CT Head without contrast negative for acute intracranial hemorrhage or large territorial infarct, with 1 cm dural-based lesion of the Left parietal convexity, likely reflecting a meningioma. 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 Patient admitted to Neurology service for management of clinically diagnosed CVA. Patient started on Aspirin and high-intensity statin. +Patent foramen ovale on TTE. Patient with apparent Right-sided vocal cord paralysis, initially kept NPO with NG tube in place. SLP evaluated patient with Modified Barium Swallow, and patient cleared for regular diet with thin liquids the day of transfer to Rehab.    The patient was evaluated by PT, and required contact guard assist-minimal assist with bed mobility, minimal-moderate assist with transfers, and moderate assist with ambulation 25 ft x2 (once with RW and then holding onto wall railing). Prior to admission, patient was fully independent in ADLs and ambulation without an assistive device. The patient was evaluated by the Physiatry team on the Neurology service and was found to be a good candidate for acute inpatient rehab.    Prior level of function: Patient states he was independent with ADLs and ambulation without an assistive device.  Current level of function: Contact guard assist-minimal assist with bed mobility, minimal-moderate assist with transfers, and moderate assist with ambulation 25 ft x2 (once with RW and then holding onto wall railing). (08 Jun 2021 15:51)    Current level of function: Supervision-contact guard assist with bed mobility, contact guard assist with transfers, moderate assistance to ambulate 50 ft with RW (14 June 2021).    TODAY'S SUBJECTIVE & REVIEW OF SYMPTOMS:  Patient seen and examined at bedside this morning with Dr. Balbuena. No overnight events. Patient states he feels well today. Patient's endorses Right-sided headache, however it continues to be much improved with indomethacin and tylenol. No new complaints today.      Review of Systems:   Constitutional:    [ x ] WNL           [   ] poor appetite   [   ] insomnia   [   ] tired   Cardio:                [ x ] WNL           [   ] CP   [   ] ETIENNE   [   ] palpitations               Resp:                   [  ] WNL           [   ] SOB   [ x ] cough, intermittent, dry   [   ] wheezing   GI:                        [ x ] WNL           [   ] constipation   [   ] diarrhea   [   ] abdominal pain   [   ] nausea   [   ] emesis                                :                      [ x ] WNL           [   ] WESTBROOK  [   ] dysuria   [   ] difficulty voiding             Endo:                   [ x ] WNL          [   ] polyuria   [   ] temperature intolerance                 Skin:                     [ x ] WNL          [   ] pain   [   ] wound   [   ] rash   MSK:                    [ x ] WNL          [   ] muscle pain   [   ] joint pain/ stiffness   [   ] muscle tenderness   [   ] swelling   Neuro:                 [   ] WNL          [ x ] Right-sided HA, better   [   ] change in vision   [   ] tremor   [ x ] ataxia on right side more than weakness   [   ]dysphagia              Cognitive:           [ x ] WNL           [   ]confusion      Psych:                  [ x ] WNL           [   ] hallucinations   [   ]agitation   [   ] delusion   [   ]depression      PHYSICAL EXAM    Vital Signs Last 24 Hrs  T(C): 36.6 (15 Varun 2021 06:43), Max: 36.7 (14 Jun 2021 20:31)  T(F): 97.8 (15 Varun 2021 06:43), Max: 98 (14 Jun 2021 20:31)  HR: 76 (15 Varun 2021 06:43) (76 - 80)  BP: 140/70 (15 Varun 2021 06:43) (131/76 - 144/79)  BP(mean): --  RR: 18 (15 Varun 2021 06:43) (18 - 18)  SpO2: --    General:[ x ] NAD, Resting Comfortable,   [   ] other:                                HEENT: [ x ] NC/AT, EOMI, PERRL , Normal Conjunctivae,   [ x  ] other: vertical nystagmus  Cardio: [ x ] RRR, no murmur,   [   ] other:                              Pulm: [ x ] No Respiratory Distress,  Lungs CTAB,   [   ] other:                       Abdomen: [ x ]ND/NT, Soft,   [   ] other:    : [ x ] NO WESTBROOK CATHETER, [   ] WESTBROOK CATHETER- no meatal tear, no discharge, [   ] other:                                            MSK: [ x ] No joint swelling, Full ROM,   [   ] other:                                         Ext: [ x ]No C/C/E, No calf tenderness,   [   ]other:    Skin: [ x ]intact,   [   ] other:                                                                   Neurological Examination:  Cognitive: [ x ] AAO x 3,   [    ]  other:                                                                      Attention:  [ x ] intact,   [    ]  other:                            Memory: [ x ] intact,    [    ]  other:     Mood/Affect: [ x ] wnl,    [    ]  other:                                                                             Communication: [ x ]Fluent, no dysarthria, following commands:  [    ] other:   CN II - XII:  [  ] intact,  [   x ] other: decreased sensation of right side of face, r sided facial droop; weakness of right eye closing                                                                                         Motor:   RIGHT UE: [   ] WNL,  [ x ] other: 4/5 shoulder abduction, elbow flexion/extension, and hand   LEFT    UE: [   ] WNL,  [ x ] other: 4/5 shoulder abduction, elbow flexion/extension, and hand   RIGHT LE: [   ] WNL,  [ x ] other: 4/5 hip flexion, knee flexion/extension, ankle dorsiflexion/plantarflexion  LEFT    LE: [   ] WNL,  [ x ] other: 4/5 hip flexion, knee flexion/extension, ankle dorsiflexion/plantarflexion    Tone: [ x ] wnl,   [    ]  other:  DTRs: [ x ]symmetric, [   ] other:  Coordination:   [ x ] intact, with bilateral finger-to-nose   [    ] other:                                                                           Sensory: [  ] Intact to light touch,   [ x ] other: intact to light touch for bilateral upper and lower extremities, but decreased sensation over Right ophthalmic, maxillary, and mandibular areas.    MEDICATIONS  (STANDING):  amLODIPine   Tablet 2.5 milliGRAM(s) Oral daily  aspirin  chewable 81 milliGRAM(s) Oral daily  atorvastatin 80 milliGRAM(s) Oral at bedtime  enoxaparin Injectable 40 milliGRAM(s) SubCutaneous at bedtime  pantoprazole    Tablet 40 milliGRAM(s) Oral before breakfast    MEDICATIONS  (PRN):  acetaminophen   Tablet .. 975 milliGRAM(s) Oral every 6 hours PRN Temp greater or equal to 38C (100.4F), Mild Pain (1 - 3), Moderate Pain (4 - 6), Severe Pain (7 - 10)  aluminum hydroxide/magnesium hydroxide/simethicone Suspension 30 milliLiter(s) Oral every 4 hours PRN Dyspepsia  indomethacin 25 milliGRAM(s) Oral every 12 hours PRN cluster headaches      Labs:                        11.5   5.47  )-----------( 268      ( 14 Jun 2021 05:54 )             34.5     06-14    141  |  104  |  15  ----------------------------<  89  4.2   |  24  |  0.9    Ca    9.4      14 Jun 2021 05:54  Mg     2.0     06-14    TPro  6.7  /  Alb  4.1  /  TBili  <0.2  /  DBili  x   /  AST  69<H>  /  ALT  89<H>  /  AlkPhos  112  06-14

## 2021-06-16 RX ADMIN — ATORVASTATIN CALCIUM 80 MILLIGRAM(S): 80 TABLET, FILM COATED ORAL at 22:22

## 2021-06-16 RX ADMIN — ENOXAPARIN SODIUM 40 MILLIGRAM(S): 100 INJECTION SUBCUTANEOUS at 22:22

## 2021-06-16 RX ADMIN — AMLODIPINE BESYLATE 2.5 MILLIGRAM(S): 2.5 TABLET ORAL at 05:39

## 2021-06-16 RX ADMIN — PANTOPRAZOLE SODIUM 40 MILLIGRAM(S): 20 TABLET, DELAYED RELEASE ORAL at 05:38

## 2021-06-16 RX ADMIN — Medication 975 MILLIGRAM(S): at 17:52

## 2021-06-16 RX ADMIN — Medication 975 MILLIGRAM(S): at 13:02

## 2021-06-16 RX ADMIN — Medication 81 MILLIGRAM(S): at 12:42

## 2021-06-16 NOTE — PROGRESS NOTE ADULT - ATTENDING COMMENTS
Patient seen and examined with the resident. We discussed the case. I have directed the care. He is improving in therapies. He lives in a rectory. He has suffered a pontine CVA. He has incoordination of the right upper extremity and lower extremity. He is improving. His headache is better; he finds that the indomethacin is helpful. He warrants acute rehab with 3 hours of daily therapies. I edited the note. Patient seen and examined with the resident. We discussed the case. I have directed the care. He is improving in therapies. He lives in a rectory that has stairs. He has suffered a pontine CVA. He has ataxia and incoordination of the right upper extremity and lower extremity. He is improving. His headache is better; he finds that the indomethacin is helpful. He warrants acute rehab with 3 hours of daily therapies. I edited the note.

## 2021-06-16 NOTE — PROGRESS NOTE ADULT - ASSESSMENT
Rehab of clinically diagnosed CVA, right cerebellar penumbra, not seen on MRI by radiology but seen by neurologist, with impaired balance, unsteady gait, Right-sided facial numbness, and dysphagia. NIHSS 3. mRS 4. Neuro./Dr. Dow reviewed the MRI he sees a right pontine CVA.     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 Structural Cardio f/u as outpatient    #Hepatitis C Ab positive  - Hepatitis B Surface Ab and Core Ab positive, Hepatitis B Surface Ag negative  - viral load pending GI following  - patient states he was worked up in the past and received some treatment for this one year ago in [place]. He was told that he did well with the treatment he received.  Outpatient. F/U     # Dysphagia  - ENT previously consulted, noted laryngeal Right-sided vocal cord paralysis  - s/p NGT  - MBSS performed, patient cleared for DASH/TLC regular consistency diet by SLP on 6/8/21    #Hyperlipidemia  - Atorvastatin 80 mg PO nightly    #Hypertension  - Amlodipine 2.5 mg PO daily    #Headache  - as per neurology-on indomethacin and O2 for cluster headache. Headache stable.      -Pain control: Tylenol PRN    -GI/Bowel Mgmt: Pantoprazole 40 mg PO daily before breakfast on Indomethacin    -Bladder management: Patient continent of bladder     -Skin:  No active issues at this time    -FEN     - Diet: DASH/TLC, regular consistency solids with thin liquids     Precautions / PROPHYLAXIS:      - Falls    - Weight bearing status: WBAT      - DVT prophylaxis: Lovenox 40 mg SubQ daily

## 2021-06-16 NOTE — PROGRESS NOTE ADULT - SUBJECTIVE AND OBJECTIVE BOX
Patient is a 55y old  Male who presents with a chief complaint of CVA (09 Jun 2021 10:47)      HPI:  55 year-old male with a PMHx of hyperlipidemia and hypertension (non-compliant with meds) who presented to ED on 6/3/21 with complaint of headache, weakness, decreased sensation on the Right side of his face, and dizziness since 1 AM. Patient states he felt perfectly fine when he went to bed on evening of 6/2/21. NIHSS 3 on arrival to ED. Out of window for tPA. CT Head without contrast negative for acute intracranial hemorrhage or large territorial infarct, with 1 cm dural-based lesion of the Left parietal convexity, likely reflecting a meningioma. 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 Patient admitted to Neurology service for management of clinically diagnosed CVA. Patient started on Aspirin and high-intensity statin. +Patent foramen ovale on TTE. Patient with apparent Right-sided vocal cord paralysis, initially kept NPO with NG tube in place. SLP evaluated patient with Modified Barium Swallow, and patient cleared for regular diet with thin liquids the day of transfer to Rehab.    The patient was evaluated by PT, and required contact guard assist-minimal assist with bed mobility, minimal-moderate assist with transfers, and moderate assist with ambulation 25 ft x2 (once with RW and then holding onto wall railing). Prior to admission, patient was fully independent in ADLs and ambulation without an assistive device. The patient was evaluated by the Physiatry team on the Neurology service and was found to be a good candidate for acute inpatient rehab.    Prior level of function: Patient states he was independent with ADLs and ambulation without an assistive device.  Current level of function: Contact guard assist-minimal assist with bed mobility, minimal-moderate assist with transfers, and moderate assist with ambulation 25 ft x2 (once with RW and then holding onto wall railing). (08 Jun 2021 15:51)    Current level of function: Supervision-contact guard assist with bed mobility, contact guard assist with transfers, moderate assistance to ambulate 50 ft x2 with RW, and min assist to ascend 8 steps x2 with 2 HR (15 Britta 2021).    TODAY'S SUBJECTIVE & REVIEW OF SYMPTOMS:  Patient seen and examined at bedside this morning with Dr. Balbuena. No overnight events. Patient states he feels well today. Patient denies headache this morning. No new complaints today.      Review of Systems:   Constitutional:    [ x ] WNL           [   ] poor appetite   [   ] insomnia   [   ] tired   Cardio:                [ x ] WNL           [   ] CP   [   ] ETIENNE   [   ] palpitations               Resp:                   [  ] WNL           [   ] SOB   [ x ] cough, intermittent, dry   [   ] wheezing   GI:                        [ x ] WNL           [   ] constipation   [   ] diarrhea   [   ] abdominal pain   [   ] nausea   [   ] emesis                                :                      [ x ] WNL           [   ] WESTBROOK  [   ] dysuria   [   ] difficulty voiding             Endo:                   [ x ] WNL          [   ] polyuria   [   ] temperature intolerance                 Skin:                     [ x ] WNL          [   ] pain   [   ] wound   [   ] rash   MSK:                    [ x ] WNL          [   ] muscle pain   [   ] joint pain/ stiffness   [   ] muscle tenderness   [   ] swelling   Neuro:                 [  ] WNL          [ x ] Right-sided HA improving  [   ] change in vision   [   ] tremor   [ x ] ataxia on right side more than weakness   [   ]dysphagia              Cognitive:           [ x ] WNL           [   ]confusion      Psych:                  [ x ] WNL           [   ] hallucinations   [   ]agitation   [   ] delusion   [   ]depression      PHYSICAL EXAM    Vital Signs Last 24 Hrs  T(C): 36.8 (16 Jun 2021 04:47), Max: 36.9 (15 Varun 2021 14:41)  T(F): 98.2 (16 Jun 2021 04:47), Max: 98.4 (15 Varun 2021 14:41)  HR: 79 (16 Jun 2021 04:47) (77 - 88)  BP: 133/71 (16 Jun 2021 04:47) (133/71 - 169/80)  BP(mean): --  RR: 20 (16 Jun 2021 04:47) (20 - 20)  SpO2: --    General:[ x ] NAD, Resting Comfortable,   [   ] other:                                HEENT: [ x ] NC/AT, EOMI, PERRL , Normal Conjunctivae,   [ x  ] other: vertical nystagmus  Cardio: [ x ] RRR, no murmur,   [   ] other:                              Pulm: [ x ] No Respiratory Distress,  Lungs CTAB,   [   ] other:                       Abdomen: [ x ]ND/NT, Soft,   [   ] other:    : [ x ] NO WESTBROOK CATHETER, [   ] WESTBROOK CATHETER- no meatal tear, no discharge, [   ] other:                                            MSK: [ x ] No joint swelling, Full ROM,   [   ] other:                                         Ext: [ x ]No C/C/E, No calf tenderness,   [   ]other:    Skin: [ x ]intact,   [   ] other:                                                                   Neurological Examination:  Cognitive: [ x ] AAO x 3,   [    ]  other:                                                                      Attention:  [ x ] intact,   [    ]  other:                            Memory: [ x ] intact,    [    ]  other:     Mood/Affect: [ x ] wnl,    [    ]  other:                                                                             Communication: [ x ]Fluent, no dysarthria, following commands:  [    ] other:   CN II - XII:  [  ] intact,  [   x ] other: decreased sensation of right side of face, r sided facial droop; weakness of right eye closing                                                                                         Motor:   RIGHT UE: [   ] WNL,  [ x ] other: 4/5 shoulder abduction, elbow flexion/extension, and hand   LEFT    UE: [   ] WNL,  [ x ] other: 4/5 shoulder abduction, elbow flexion/extension, and hand   RIGHT LE: [   ] WNL,  [ x ] other: 4/5 hip flexion, knee flexion/extension, ankle dorsiflexion/plantarflexion  LEFT    LE: [   ] WNL,  [ x ] other: 4/5 hip flexion, knee flexion/extension, ankle dorsiflexion/plantarflexion    Tone: [ x ] wnl,   [    ]  other:  DTRs: [ x ]symmetric, [   ] other:  Coordination:   [ x ] intact, with bilateral finger-to-nose   [    ] other:                                                                           Sensory: [  ] Intact to light touch,   [ x ] other: intact to light touch for bilateral upper and lower extremities, but decreased sensation over Right ophthalmic, maxillary, and mandibular areas.    MEDICATIONS  (STANDING):  amLODIPine   Tablet 2.5 milliGRAM(s) Oral daily  aspirin  chewable 81 milliGRAM(s) Oral daily  atorvastatin 80 milliGRAM(s) Oral at bedtime  enoxaparin Injectable 40 milliGRAM(s) SubCutaneous at bedtime  pantoprazole    Tablet 40 milliGRAM(s) Oral before breakfast    MEDICATIONS  (PRN):  acetaminophen   Tablet .. 975 milliGRAM(s) Oral every 6 hours PRN Temp greater or equal to 38C (100.4F), Mild Pain (1 - 3), Moderate Pain (4 - 6), Severe Pain (7 - 10)  aluminum hydroxide/magnesium hydroxide/simethicone Suspension 30 milliLiter(s) Oral every 4 hours PRN Dyspepsia  indomethacin 25 milliGRAM(s) Oral every 12 hours PRN cluster headaches      Labs:                     11.5   5.47  )-----------( 268      ( 14 Jun 2021 05:54 )             34.5     06-14    141  |  104  |  15  ----------------------------<  89  4.2   |  24  |  0.9    Ca    9.4      14 Jun 2021 05:54  Mg     2.0     06-14    TPro  6.7  /  Alb  4.1  /  TBili  <0.2  /  DBili  x   /  AST  69<H>  /  ALT  89<H>  /  AlkPhos  112  06-14

## 2021-06-17 LAB
HCV RNA SERPL NAA DL=5-ACNC: SIGNIFICANT CHANGE UP
HCV RNA SPEC NAA+PROBE-LOG IU: SIGNIFICANT CHANGE UP LOG IU/ML

## 2021-06-17 RX ADMIN — PANTOPRAZOLE SODIUM 40 MILLIGRAM(S): 20 TABLET, DELAYED RELEASE ORAL at 06:48

## 2021-06-17 RX ADMIN — ATORVASTATIN CALCIUM 80 MILLIGRAM(S): 80 TABLET, FILM COATED ORAL at 22:53

## 2021-06-17 RX ADMIN — Medication 81 MILLIGRAM(S): at 12:48

## 2021-06-17 RX ADMIN — ENOXAPARIN SODIUM 40 MILLIGRAM(S): 100 INJECTION SUBCUTANEOUS at 22:53

## 2021-06-17 RX ADMIN — AMLODIPINE BESYLATE 2.5 MILLIGRAM(S): 2.5 TABLET ORAL at 06:48

## 2021-06-17 RX ADMIN — Medication 25 MILLIGRAM(S): at 22:57

## 2021-06-17 NOTE — PROGRESS NOTE ADULT - ATTENDING COMMENTS
Patient seen and examined with the resident. We discussed the case. I have directed the care. He is improving in therapies. He lives in a rectory that has stairs. He has suffered a pontine CVA. He has ataxia and incoordination of the right upper extremity and lower extremity. He is improving. His headache is better; he finds that the indomethacin is helpful. He warrants acute rehab with 3 hours of daily therapies. I edited the note. Patient seen and examined with the resident. We discussed the case. I have directed the care. He is improving in therapies. He lives in a rectory that has stairs. He has suffered a pontine CVA. He has ataxia and incoordination of the right upper extremity and lower extremity. His headache is better; he finds that the indomethacin as needed is helpful. He warrants acute rehab with 3 hours of daily therapies. I edited the note.

## 2021-06-17 NOTE — PROGRESS NOTE ADULT - SUBJECTIVE AND OBJECTIVE BOX
Patient is a 55y old  Male who presents with a chief complaint of CVA (09 Jun 2021 10:47)      HPI:  55 year-old male with a PMHx of hyperlipidemia and hypertension (non-compliant with meds) who presented to ED on 6/3/21 with complaint of headache, weakness, decreased sensation on the Right side of his face, and dizziness since 1 AM. Patient states he felt perfectly fine when he went to bed on evening of 6/2/21. NIHSS 3 on arrival to ED. Out of window for tPA. CT Head without contrast negative for acute intracranial hemorrhage or large territorial infarct, with 1 cm dural-based lesion of the Left parietal convexity, likely reflecting a meningioma. 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 Patient admitted to Neurology service for management of clinically diagnosed CVA. Patient started on Aspirin and high-intensity statin. +Patent foramen ovale on TTE. Patient with apparent Right-sided vocal cord paralysis, initially kept NPO with NG tube in place. SLP evaluated patient with Modified Barium Swallow, and patient cleared for regular diet with thin liquids the day of transfer to Rehab.    The patient was evaluated by PT, and required contact guard assist-minimal assist with bed mobility, minimal-moderate assist with transfers, and moderate assist with ambulation 25 ft x2 (once with RW and then holding onto wall railing). Prior to admission, patient was fully independent in ADLs and ambulation without an assistive device. The patient was evaluated by the Physiatry team on the Neurology service and was found to be a good candidate for acute inpatient rehab.    Prior level of function: Patient states he was independent with ADLs and ambulation without an assistive device.  Current level of function: Contact guard assist-minimal assist with bed mobility, minimal-moderate assist with transfers, and moderate assist with ambulation 25 ft x2 (once with RW and then holding onto wall railing). (08 Jun 2021 15:51)    Current level of function: Supervision-contact guard assist with bed mobility, contact guard assist with transfers, moderate assistance to ambulate 50 ft x2 with RW, and min assist to ascend 8 steps x2 with 2 HR (15 Britta 2021).    TODAY'S SUBJECTIVE & REVIEW OF SYMPTOMS:  Patient seen and examined at bedside this morning with Dr. Balbuena. No overnight events. Patient states he feels well today. Patient notes his Right-sided facial numbness is improving. No new complaints today.      Review of Systems:   Constitutional:    [ x ] WNL           [   ] poor appetite   [   ] insomnia   [   ] tired   Cardio:                [ x ] WNL           [   ] CP   [   ] ETIENNE   [   ] palpitations               Resp:                   [  ] WNL           [   ] SOB   [ x ] cough, intermittent, dry   [   ] wheezing   GI:                        [ x ] WNL           [   ] constipation   [   ] diarrhea   [   ] abdominal pain   [   ] nausea   [   ] emesis                                :                      [ x ] WNL           [   ] WESTBROOK  [   ] dysuria   [   ] difficulty voiding             Endo:                   [ x ] WNL          [   ] polyuria   [   ] temperature intolerance                 Skin:                     [ x ] WNL          [   ] pain   [   ] wound   [   ] rash   MSK:                    [ x ] WNL          [   ] muscle pain   [   ] joint pain/ stiffness   [   ] muscle tenderness   [   ] swelling   Neuro:                 [  ] WNL          [ x ] Right-sided HA improving  [   ] change in vision   [   ] tremor   [ x ] ataxia on right side more than weakness   [   ]dysphagia              Cognitive:           [ x ] WNL           [   ]confusion      Psych:                  [ x ] WNL           [   ] hallucinations   [   ]agitation   [   ] delusion   [   ]depression      PHYSICAL EXAM    Vital Signs Last 24 Hrs  T(C): 36 (17 Jun 2021 06:07), Max: 37 (16 Jun 2021 21:21)  T(F): 96.8 (17 Jun 2021 06:07), Max: 98.6 (16 Jun 2021 21:21)  HR: 82 (17 Jun 2021 06:07) (82 - 86)  BP: 141/76 (17 Jun 2021 06:07) (138/82 - 160/91)  BP(mean): --  RR: 18 (17 Jun 2021 06:07) (18 - 20)  SpO2: --    General:[ x ] NAD, Resting Comfortable,   [   ] other:                                HEENT: [ x ] NC/AT, EOMI, PERRL , Normal Conjunctivae,   [ x  ] other: vertical nystagmus  Cardio: [ x ] RRR, no murmur,   [   ] other:                              Pulm: [ x ] No Respiratory Distress,  Lungs CTAB,   [   ] other:                       Abdomen: [ x ]ND/NT, Soft,   [   ] other:    : [ x ] NO WESTBROOK CATHETER, [   ] WESTBROOK CATHETER- no meatal tear, no discharge, [   ] other:                                            MSK: [ x ] No joint swelling, Full ROM,   [   ] other:                                         Ext: [ x ]No C/C/E, No calf tenderness,   [   ]other:    Skin: [ x ]intact,   [   ] other:                                                                   Neurological Examination:  Cognitive: [ x ] AAO x 3,   [    ]  other:                                                                      Attention:  [ x ] intact,   [    ]  other:                            Memory: [ x ] intact,    [    ]  other:     Mood/Affect: [ x ] wnl,    [    ]  other:                                                                             Communication: [ x ]Fluent, no dysarthria, following commands:  [    ] other:   CN II - XII:  [  ] intact,  [   x ] other: decreased sensation of right side of face, r sided facial droop; weakness of right eye closing                                                                                         Motor:   RIGHT UE: [   ] WNL,  [ x ] other: 4/5 shoulder abduction, elbow flexion/extension, and hand   LEFT    UE: [   ] WNL,  [ x ] other: 4/5 shoulder abduction, elbow flexion/extension, and hand   RIGHT LE: [   ] WNL,  [ x ] other: 4/5 hip flexion, knee flexion/extension, ankle dorsiflexion/plantarflexion  LEFT    LE: [   ] WNL,  [ x ] other: 4/5 hip flexion, knee flexion/extension, ankle dorsiflexion/plantarflexion    Tone: [ x ] wnl,   [    ]  other:  DTRs: [ x ]symmetric, [   ] other:  Coordination:   [ x ] intact, with bilateral finger-to-nose   [    ] other:                                                                           Sensory: [  ] Intact to light touch,   [ x ] other: intact to light touch for bilateral upper and lower extremities, but decreased sensation over Right ophthalmic, maxillary, and mandibular areas.    MEDICATIONS  (STANDING):  amLODIPine   Tablet 2.5 milliGRAM(s) Oral daily  aspirin  chewable 81 milliGRAM(s) Oral daily  atorvastatin 80 milliGRAM(s) Oral at bedtime  enoxaparin Injectable 40 milliGRAM(s) SubCutaneous at bedtime  pantoprazole    Tablet 40 milliGRAM(s) Oral before breakfast    MEDICATIONS  (PRN):  acetaminophen   Tablet .. 975 milliGRAM(s) Oral every 6 hours PRN Temp greater or equal to 38C (100.4F), Mild Pain (1 - 3), Moderate Pain (4 - 6), Severe Pain (7 - 10)  aluminum hydroxide/magnesium hydroxide/simethicone Suspension 30 milliLiter(s) Oral every 4 hours PRN Dyspepsia  indomethacin 25 milliGRAM(s) Oral every 12 hours PRN cluster headaches      Labs:                     11.5   5.47  )-----------( 268      ( 14 Jun 2021 05:54 )             34.5     06-14    141  |  104  |  15  ----------------------------<  89  4.2   |  24  |  0.9    Ca    9.4      14 Jun 2021 05:54  Mg     2.0     06-14    TPro  6.7  /  Alb  4.1  /  TBili  <0.2  /  DBili  x   /  AST  69<H>  /  ALT  89<H>  /  AlkPhos  112  06-14

## 2021-06-17 NOTE — PROGRESS NOTE ADULT - ASSESSMENT
Pain/Location: Right side of Face, Headache, Dizziness Rating: 3/10 Intervention: RN notified  Orientation: Oriented to Self/Place/Event/Time (Month/Date/Year/ Day/Time)   Arousal Level: Alert  Behavior: Pleasant and Cooperative   Affect Range: WFL    Needed: No       #: N/A   Treatment Session Focused on: Mood, Pain, cognition     Assessment Section:     Met with patient at bedside. Patient endorsed dizziness and headache on the right side of his face and head. He described fluctuating levels of pain throughout the day, which range from 3/10 to 9/10. He stated that pain worsens when he is unable to get sleep the night prior. RN was notified but patient denied pain medication. Discussed progress in therapies. Patient reported significant improvement in mobility and cognition. Patient endorsed feeling more mentally alert and denied any difficulty with memory or language. Discussed sleep and appetite with patient. Patient reported that he has been getting 4-5 hours of sleep per night and feels rested when he wakes up in the morning. He also reported that he has good appetite. Patient stated that his mood was "positive" and he expressed motivation and hopefulness that he will improve. Discussed the social and emotional support he experiences. Support and education were provided.    Goals: ? Facilitate Positive Coping ? Individual Psychotherapy ? Cognitive Assessment  Plan: 1-2 times a week 30-60 minutes ? Individual Psychotherapy ? Cognitive Assessment ? Family contact/support/education     no

## 2021-06-17 NOTE — CHART NOTE - NSCHARTNOTEFT_GEN_A_CORE
#Rehab of clinically diagnosed CVA, right cerebellar penumbra    Pt reports 100% PO intake/appetite. no chewing/swallowing difficulty. no food preferences mentioned. No GI discomfort, LBM 6/15 per pt. AAOX4; No NFPF, skin: WDL; no edema noted per RN flowsheets. Labs reviewed; meds reviewed. No new wts, pt OOB in chair, unable to obtain bedscale wt.    Recommendations:   1. Continue with current diet order. 2. Obtain daily wts     [id number]  pt not @ risk, RD to f/u in 7 days.   RD remains available: Alicia Brantley [phone]

## 2021-06-17 NOTE — PROGRESS NOTE ADULT - ASSESSMENT
Rehab of clinically diagnosed CVA, right cerebellar penumbra, not seen on MRI by radiology but seen by neurologist, with impaired balance, unsteady gait, Right-sided facial numbness, and dysphagia. NIHSS 3. mRS 4. Neuro./Dr. Dow reviewed the MRI he sees a right pontine CVA.     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 Structural Cardio f/u as outpatient    #Hepatitis C Ab positive  - Hepatitis B Surface Ab and Core Ab positive, Hepatitis B Surface Ag negative  - viral load pending GI following  - patient states he was worked up in the past and received some treatment for this one year ago in [place]. He was told that he did well with the treatment he received.  Outpatient. F/U     # Dysphagia  - ENT previously consulted, noted laryngeal Right-sided vocal cord paralysis  - s/p NGT  - MBSS performed, patient cleared for DASH/TLC regular consistency diet by SLP on 6/8/21    #Hyperlipidemia  - Atorvastatin 80 mg PO nightly    #Hypertension  - Amlodipine 2.5 mg PO daily    #Headache  - as per neurology-on indomethacin and O2 for cluster headache. Headache stable.      -Pain control: Tylenol PRN    -GI/Bowel Mgmt: Pantoprazole 40 mg PO daily before breakfast on Indomethacin    -Bladder management: Patient continent of bladder     -Skin:  No active issues at this time    -FEN     - Diet: DASH/TLC, regular consistency solids with thin liquids     Precautions / PROPHYLAXIS:      - Falls    - Weight bearing status: WBAT      - DVT prophylaxis: Lovenox 40 mg SubQ daily

## 2021-06-17 NOTE — PROGRESS NOTE ADULT - ASSESSMENT
Primary Contact Name: Father Wesley Olivarez    Relationship to Patient: Boss, employer, and     Social History: Patient is a  and has been living with two other priests since September. His room is located on the second floor. Patient has a nephew who lives in [place] but Father Juanis was not aware of any other family members that the patient is in contact with.     ADLs: Independent  IADLs: Independent, to the best of Father Juanis’s knowledge    Previous Cognitive Status: Father Juanis reported that patient has been experiencing mild memory problems starting in February, which worsened in May. Specifically, patient was forgetting appointments (e.g., forgetting to attend a ) and was forgetting to complete tasks. No other cognitive difficulties were reported.    Previous Psychiatric History: None    Previous Psychiatry/Psychotherapy follow-up: None    Previous substance abuse history: None    Current status:  Mood changes: No changes in mood. Patient is very positive and able to cope well through Church.  Cognition changes: No significant cognitive changes were noted  Behavior changes: No behavioral changes were noted    Family concerns: Father Juanis’s main concerns include poor mobility. Father Juanis emphasized the importance of patient being able to independently ambulate in order to fulfill his duties as a . Father Juanis also reported that he has been concerned about patient’s poor diet, fluctuating sleep, and low energy.    Family Education: Father Juanis was educated about the rehabilitation process, current status, and education sessions. Discharge planning initiated.

## 2021-06-18 RX ORDER — MECLIZINE HCL 12.5 MG
12.5 TABLET ORAL DAILY
Refills: 0 | Status: DISCONTINUED | OUTPATIENT
Start: 2021-06-18 | End: 2021-06-22

## 2021-06-18 RX ADMIN — Medication 975 MILLIGRAM(S): at 14:09

## 2021-06-18 RX ADMIN — Medication 81 MILLIGRAM(S): at 11:37

## 2021-06-18 RX ADMIN — Medication 975 MILLIGRAM(S): at 15:12

## 2021-06-18 RX ADMIN — ATORVASTATIN CALCIUM 80 MILLIGRAM(S): 80 TABLET, FILM COATED ORAL at 22:17

## 2021-06-18 RX ADMIN — PANTOPRAZOLE SODIUM 40 MILLIGRAM(S): 20 TABLET, DELAYED RELEASE ORAL at 07:02

## 2021-06-18 RX ADMIN — Medication 25 MILLIGRAM(S): at 22:20

## 2021-06-18 RX ADMIN — ENOXAPARIN SODIUM 40 MILLIGRAM(S): 100 INJECTION SUBCUTANEOUS at 22:17

## 2021-06-18 RX ADMIN — AMLODIPINE BESYLATE 2.5 MILLIGRAM(S): 2.5 TABLET ORAL at 07:02

## 2021-06-18 NOTE — PROGRESS NOTE ADULT - ATTENDING COMMENTS
Patient seen and examined with the resident. We discussed the case. I have directed the care. He is improving in therapies. He lives in a rectory that has stairs. He has suffered a pontine CVA. He has ataxia and incoordination of the right upper extremity and lower extremity. His headache is better; he finds that the indomethacin as needed is helpful. He warrants acute rehab with 3 hours of daily therapies. I edited the note. Patient seen and examined with the resident. We discussed the case. I have directed the care. He is improving in therapies. He lives in a rectory that has stairs. He has suffered a pontine CVA. He has ataxia and incoordination of the right upper extremity and lower extremity. His headache is better; He finds that the indomethacin as needed is helpful. He gets some subjective vertigo as needed and a meclizine prn ordered was written. His BP is stable with standing and has been checked by his PT. He warrants acute rehab with 3 hours of daily therapies. I edited the note. WDL

## 2021-06-18 NOTE — PROGRESS NOTE ADULT - SUBJECTIVE AND OBJECTIVE BOX
Patient is a 55y old  Male who presents with a chief complaint of CVA (09 Jun 2021 10:47)      HPI:  55 year-old male with a PMHx of hyperlipidemia and hypertension (non-compliant with meds) who presented to ED on 6/3/21 with complaint of headache, weakness, decreased sensation on the Right side of his face, and dizziness since 1 AM. Patient states he felt perfectly fine when he went to bed on evening of 6/2/21. NIHSS 3 on arrival to ED. Out of window for tPA. CT Head without contrast negative for acute intracranial hemorrhage or large territorial infarct, with 1 cm dural-based lesion of the Left parietal convexity, likely reflecting a meningioma. 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 Patient admitted to Neurology service for management of clinically diagnosed CVA. Patient started on Aspirin and high-intensity statin. +Patent foramen ovale on TTE. Patient with apparent Right-sided vocal cord paralysis, initially kept NPO with NG tube in place. SLP evaluated patient with Modified Barium Swallow, and patient cleared for regular diet with thin liquids the day of transfer to Rehab.    The patient was evaluated by PT, and required contact guard assist-minimal assist with bed mobility, minimal-moderate assist with transfers, and moderate assist with ambulation 25 ft x2 (once with RW and then holding onto wall railing). Prior to admission, patient was fully independent in ADLs and ambulation without an assistive device. The patient was evaluated by the Physiatry team on the Neurology service and was found to be a good candidate for acute inpatient rehab.    Prior level of function: Patient states he was independent with ADLs and ambulation without an assistive device.  Current level of function: Contact guard assist-minimal assist with bed mobility, minimal-moderate assist with transfers, and moderate assist with ambulation 25 ft x2 (once with RW and then holding onto wall railing). (08 Jun 2021 15:51)    Current level of function: Supervision with bed mobility, contact guard assist with transfers, contact guard assist to ambulate 100 ft x2 with RW, and contact guard assist to ascend 8 steps x2 with 1 HR (17 June 2021).    TODAY'S SUBJECTIVE & REVIEW OF SYMPTOMS:  Patient seen and examined at bedside this morning with Dr. Balbuena. No overnight events. Patient states he feels well today. Patient notes his Right-sided facial numbness is improving. Patient complaining of intermittent dizziness/subjective vertigo. Meclizine PRN ordered.     Review of Systems:   Constitutional:    [ x ] WNL           [   ] poor appetite   [   ] insomnia   [   ] tired   Cardio:                [ x ] WNL           [   ] CP   [   ] ETIENNE   [   ] palpitations               Resp:                   [  ] WNL           [   ] SOB   [ x ] cough, intermittent, dry   [   ] wheezing   GI:                        [ x ] WNL           [   ] constipation   [   ] diarrhea   [   ] abdominal pain   [   ] nausea   [   ] emesis                                :                      [ x ] WNL           [   ] WESTBROOK  [   ] dysuria   [   ] difficulty voiding             Endo:                   [ x ] WNL          [   ] polyuria   [   ] temperature intolerance                 Skin:                     [ x ] WNL          [   ] pain   [   ] wound   [   ] rash   MSK:                    [ x ] WNL          [   ] muscle pain   [   ] joint pain/ stiffness   [   ] muscle tenderness   [   ] swelling   Neuro:                 [  ] WNL          [ x ] Right-sided HA improving  [   ] change in vision   [   ] tremor   [ x ] ataxia on right side more than weakness   [   ]dysphagia              Cognitive:           [ x ] WNL           [   ]confusion      Psych:                  [ x ] WNL           [   ] hallucinations   [   ]agitation   [   ] delusion   [   ]depression      PHYSICAL EXAM    Vital Signs Last 24 Hrs  T(C): 36.3 (18 Jun 2021 05:53), Max: 36.3 (18 Jun 2021 05:53)  T(F): 97.4 (18 Jun 2021 05:53), Max: 97.4 (18 Jun 2021 05:53)  HR: 77 (18 Jun 2021 05:53) (77 - 90)  BP: 133/66 (18 Jun 2021 05:53) (133/66 - 156/91)  BP(mean): --  RR: 18 (18 Jun 2021 05:53) (18 - 18)  SpO2: --    General:[ x ] NAD, Resting Comfortable,   [   ] other:                                HEENT: [ x ] NC/AT, EOMI, PERRL , Normal Conjunctivae,   [ x  ] other: vertical nystagmus  Cardio: [ x ] RRR, no murmur,   [   ] other:                              Pulm: [ x ] No Respiratory Distress,  Lungs CTAB,   [   ] other:                       Abdomen: [ x ]ND/NT, Soft,   [   ] other:    : [ x ] NO WESTBROOK CATHETER, [   ] WESTBROOK CATHETER- no meatal tear, no discharge, [   ] other:                                            MSK: [ x ] No joint swelling, Full ROM,   [   ] other:                                         Ext: [ x ]No C/C/E, No calf tenderness,   [   ]other:    Skin: [ x ]intact,   [   ] other:                                                                   Neurological Examination:  Cognitive: [ x ] AAO x 3,   [    ]  other:                                                                      Attention:  [ x ] intact,   [    ]  other:                            Memory: [ x ] intact,    [    ]  other:     Mood/Affect: [ x ] wnl,    [    ]  other:                                                                             Communication: [ x ]Fluent, no dysarthria, following commands:  [    ] other:   CN II - XII:  [  ] intact,  [   x ] other: decreased sensation of right side of face, r sided facial droop; weakness of right eye closing                                                                                         Motor:   RIGHT UE: [   ] WNL,  [ x ] other: 4/5 shoulder abduction, elbow flexion/extension, and hand   LEFT    UE: [   ] WNL,  [ x ] other: 4/5 shoulder abduction, elbow flexion/extension, and hand   RIGHT LE: [   ] WNL,  [ x ] other: 4/5 hip flexion, knee flexion/extension, ankle dorsiflexion/plantarflexion  LEFT    LE: [   ] WNL,  [ x ] other: 4/5 hip flexion, knee flexion/extension, ankle dorsiflexion/plantarflexion    Tone: [ x ] wnl,   [    ]  other:  DTRs: [ x ]symmetric, [   ] other:  Coordination:   [ x ] intact, with bilateral finger-to-nose   [    ] other:                                                                           Sensory: [  ] Intact to light touch,   [ x ] other: intact to light touch for bilateral upper and lower extremities, but decreased sensation over Right ophthalmic, maxillary, and mandibular areas.    MEDICATIONS  (STANDING):  amLODIPine   Tablet 2.5 milliGRAM(s) Oral daily  aspirin  chewable 81 milliGRAM(s) Oral daily  atorvastatin 80 milliGRAM(s) Oral at bedtime  enoxaparin Injectable 40 milliGRAM(s) SubCutaneous at bedtime  pantoprazole    Tablet 40 milliGRAM(s) Oral before breakfast    MEDICATIONS  (PRN):  acetaminophen   Tablet .. 975 milliGRAM(s) Oral every 6 hours PRN Temp greater or equal to 38C (100.4F), Mild Pain (1 - 3), Moderate Pain (4 - 6), Severe Pain (7 - 10)  aluminum hydroxide/magnesium hydroxide/simethicone Suspension 30 milliLiter(s) Oral every 4 hours PRN Dyspepsia  indomethacin 25 milliGRAM(s) Oral every 12 hours PRN cluster headaches  meclizine 12.5 milliGRAM(s) Oral daily PRN Dizziness      Labs:                     11.5   5.47  )-----------( 268      ( 14 Jun 2021 05:54 )             34.5     06-14    141  |  104  |  15  ----------------------------<  89  4.2   |  24  |  0.9    Ca    9.4      14 Jun 2021 05:54  Mg     2.0     06-14    TPro  6.7  /  Alb  4.1  /  TBili  <0.2  /  DBili  x   /  AST  69<H>  /  ALT  89<H>  /  AlkPhos  112  06-14

## 2021-06-18 NOTE — PROGRESS NOTE ADULT - ASSESSMENT
Rehab of clinically diagnosed CVA, right cerebellar penumbra, not seen on MRI by radiology but seen by neurologist, with impaired balance, unsteady gait, Right-sided facial numbness, and dysphagia. NIHSS 3. mRS 4. Neuro./Dr. Dow reviewed the MRI he sees a right pontine CVA.     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 Structural Cardio f/u as outpatient    #Hepatitis C Ab positive  - Hepatitis B Surface Ab and Core Ab positive, Hepatitis B Surface Ag negative  - Hepatitis C RNA PCR negative  - patient states he was worked up in the past and received some treatment for this one year ago in [place]. He was told that he did well with the treatment he received.  Outpatient GI F/U     #Dizziness/Vertigo  - Meclizine 12.5 mg PO daily PRN for dizziness    # Dysphagia  - ENT previously consulted, noted laryngeal Right-sided vocal cord paralysis  - s/p NGT  - MBSS performed, patient cleared for DASH/TLC regular consistency diet by SLP on 6/8/21    #Hyperlipidemia  - Atorvastatin 80 mg PO nightly    #Hypertension  - Amlodipine 2.5 mg PO daily    #Headache  - as per neurology-on indomethacin and O2 for cluster headache. Headache stable.      -Pain control: Tylenol PRN    -GI/Bowel Mgmt: Pantoprazole 40 mg PO daily before breakfast on Indomethacin    -Bladder management: Patient continent of bladder     -Skin:  No active issues at this time    -FEN     - Diet: DASH/TLC, regular consistency solids with thin liquids     Precautions / PROPHYLAXIS:      - Falls    - Weight bearing status: WBAT      - DVT prophylaxis: Lovenox 40 mg SubQ daily     Rehab of clinically diagnosed CVA, right cerebellar penumbra, not seen on MRI by radiology but seen by neurologist, with impaired balance, unsteady gait, Right-sided facial numbness, and dysphagia. NIHSS 3. mRS 4. Neuro./Dr. Dow reviewed the MRI he sees a right pontine CVA.     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 Structural Cardio f/u as outpatient    #Hepatitis C Ab positive  - Hepatitis B Surface Ab and Core Ab positive, Hepatitis B Surface Ag negative  - Hepatitis C RNA PCR negative  - patient states he was worked up in the past and received some treatment for this one year ago in [place]. He was told that he did well with the treatment he received.  Outpatient GI F/U     #Vertigo  - His BP is good when standing with PT.  - Meclizine 12.5 mg PO daily PRN for dizziness added. He reports some subjective vertigo.    # Dysphagia  - ENT previously consulted, noted laryngeal Right-sided vocal cord paralysis  - s/p NGT  - MBSS performed, patient cleared for DASH/TLC regular consistency diet by SLP on 6/8/21    #Hyperlipidemia  - Atorvastatin 80 mg PO nightly    #Hypertension  - Amlodipine 2.5 mg PO daily    #Headache  - as per neurology-on indomethacin and O2 for cluster headache. Headache stable.      -Pain control: Tylenol PRN    -GI/Bowel Mgmt: Pantoprazole 40 mg PO daily before breakfast on Indomethacin    -Bladder management: Patient continent of bladder     -Skin:  No active issues at this time    -FEN     - Diet: DASH/TLC, regular consistency solids with thin liquids     Precautions / PROPHYLAXIS:      - Falls    - Weight bearing status: WBAT      - DVT prophylaxis: Lovenox 40 mg SubQ daily

## 2021-06-19 LAB — GLUCOSE BLDC GLUCOMTR-MCNC: 134 MG/DL — HIGH (ref 70–99)

## 2021-06-19 RX ADMIN — ENOXAPARIN SODIUM 40 MILLIGRAM(S): 100 INJECTION SUBCUTANEOUS at 22:37

## 2021-06-19 RX ADMIN — Medication 81 MILLIGRAM(S): at 12:23

## 2021-06-19 RX ADMIN — PANTOPRAZOLE SODIUM 40 MILLIGRAM(S): 20 TABLET, DELAYED RELEASE ORAL at 06:52

## 2021-06-19 RX ADMIN — Medication 975 MILLIGRAM(S): at 17:01

## 2021-06-19 RX ADMIN — Medication 975 MILLIGRAM(S): at 16:01

## 2021-06-19 RX ADMIN — ATORVASTATIN CALCIUM 80 MILLIGRAM(S): 80 TABLET, FILM COATED ORAL at 22:37

## 2021-06-19 RX ADMIN — AMLODIPINE BESYLATE 2.5 MILLIGRAM(S): 2.5 TABLET ORAL at 06:52

## 2021-06-19 NOTE — PROGRESS NOTE ADULT - SUBJECTIVE AND OBJECTIVE BOX
T(C): 36.2 (06-19-21 @ 04:51), Max: 36.4 (06-18-21 @ 13:40)  HR: 81 (06-19-21 @ 04:51) (81 - 99)  BP: 143/78 (06-19-21 @ 04:51) (137/98 - 151/94)  RR: 18 (06-19-21 @ 04:51) (18 - 18)  SpO2: --      Patient was stable overnight and expresses no new complaints     PE:    Alert   LUNGS- clear  COR- RRR  ABD- SOFT, NT  EXTR- w/o edema  NEURO- stable

## 2021-06-20 RX ADMIN — Medication 975 MILLIGRAM(S): at 06:09

## 2021-06-20 RX ADMIN — Medication 81 MILLIGRAM(S): at 12:38

## 2021-06-20 RX ADMIN — AMLODIPINE BESYLATE 2.5 MILLIGRAM(S): 2.5 TABLET ORAL at 06:06

## 2021-06-20 RX ADMIN — ENOXAPARIN SODIUM 40 MILLIGRAM(S): 100 INJECTION SUBCUTANEOUS at 22:30

## 2021-06-20 RX ADMIN — PANTOPRAZOLE SODIUM 40 MILLIGRAM(S): 20 TABLET, DELAYED RELEASE ORAL at 06:06

## 2021-06-20 RX ADMIN — Medication 975 MILLIGRAM(S): at 06:43

## 2021-06-20 RX ADMIN — ATORVASTATIN CALCIUM 80 MILLIGRAM(S): 80 TABLET, FILM COATED ORAL at 22:30

## 2021-06-20 RX ADMIN — Medication 25 MILLIGRAM(S): at 22:31

## 2021-06-20 NOTE — PROGRESS NOTE ADULT - SUBJECTIVE AND OBJECTIVE BOX
T(C): 36.6 (06-20-21 @ 05:22), Max: 36.6 (06-20-21 @ 05:22)  HR: 94 (06-20-21 @ 05:22) (88 - 96)  BP: 112/67 (06-20-21 @ 05:22) (112/67 - 159/90)  RR: 18 (06-20-21 @ 05:22) (18 - 18)  SpO2: --      Patient was stable overnight and expresses no new complaints     PE:    Alert   LUNGS- clear  COR- RRR  ABD- SOFT, NT  EXTR- w/o edema  NEURO- stable

## 2021-06-21 LAB
ALBUMIN SERPL ELPH-MCNC: 4.8 G/DL — SIGNIFICANT CHANGE UP (ref 3.5–5.2)
ALP SERPL-CCNC: 113 U/L — SIGNIFICANT CHANGE UP (ref 30–115)
ALT FLD-CCNC: 122 U/L — HIGH (ref 0–41)
ANION GAP SERPL CALC-SCNC: 12 MMOL/L — SIGNIFICANT CHANGE UP (ref 7–14)
AST SERPL-CCNC: 87 U/L — HIGH (ref 0–41)
BASOPHILS # BLD AUTO: 0.09 K/UL — SIGNIFICANT CHANGE UP (ref 0–0.2)
BASOPHILS NFR BLD AUTO: 1.5 % — HIGH (ref 0–1)
BILIRUB SERPL-MCNC: <0.2 MG/DL — SIGNIFICANT CHANGE UP (ref 0.2–1.2)
BUN SERPL-MCNC: 16 MG/DL — SIGNIFICANT CHANGE UP (ref 10–20)
CALCIUM SERPL-MCNC: 10.3 MG/DL — HIGH (ref 8.5–10.1)
CHLORIDE SERPL-SCNC: 99 MMOL/L — SIGNIFICANT CHANGE UP (ref 98–110)
CO2 SERPL-SCNC: 24 MMOL/L — SIGNIFICANT CHANGE UP (ref 17–32)
CREAT SERPL-MCNC: 0.8 MG/DL — SIGNIFICANT CHANGE UP (ref 0.7–1.5)
EOSINOPHIL # BLD AUTO: 0.08 K/UL — SIGNIFICANT CHANGE UP (ref 0–0.7)
EOSINOPHIL NFR BLD AUTO: 1.3 % — SIGNIFICANT CHANGE UP (ref 0–8)
GLUCOSE SERPL-MCNC: 109 MG/DL — HIGH (ref 70–99)
HCT VFR BLD CALC: 38 % — LOW (ref 42–52)
HGB BLD-MCNC: 12.3 G/DL — LOW (ref 14–18)
IMM GRANULOCYTES NFR BLD AUTO: 1.8 % — HIGH (ref 0.1–0.3)
LYMPHOCYTES # BLD AUTO: 2.19 K/UL — SIGNIFICANT CHANGE UP (ref 1.2–3.4)
LYMPHOCYTES # BLD AUTO: 36.4 % — SIGNIFICANT CHANGE UP (ref 20.5–51.1)
MAGNESIUM SERPL-MCNC: 2.2 MG/DL — SIGNIFICANT CHANGE UP (ref 1.8–2.4)
MCHC RBC-ENTMCNC: 28.3 PG — SIGNIFICANT CHANGE UP (ref 27–31)
MCHC RBC-ENTMCNC: 32.4 G/DL — SIGNIFICANT CHANGE UP (ref 32–37)
MCV RBC AUTO: 87.6 FL — SIGNIFICANT CHANGE UP (ref 80–94)
MONOCYTES # BLD AUTO: 0.6 K/UL — SIGNIFICANT CHANGE UP (ref 0.1–0.6)
MONOCYTES NFR BLD AUTO: 10 % — HIGH (ref 1.7–9.3)
NEUTROPHILS # BLD AUTO: 2.95 K/UL — SIGNIFICANT CHANGE UP (ref 1.4–6.5)
NEUTROPHILS NFR BLD AUTO: 49 % — SIGNIFICANT CHANGE UP (ref 42.2–75.2)
NRBC # BLD: 0 /100 WBCS — SIGNIFICANT CHANGE UP (ref 0–0)
PLATELET # BLD AUTO: 425 K/UL — HIGH (ref 130–400)
POTASSIUM SERPL-MCNC: 4.4 MMOL/L — SIGNIFICANT CHANGE UP (ref 3.5–5)
POTASSIUM SERPL-SCNC: 4.4 MMOL/L — SIGNIFICANT CHANGE UP (ref 3.5–5)
PROT SERPL-MCNC: 7.5 G/DL — SIGNIFICANT CHANGE UP (ref 6–8)
RBC # BLD: 4.34 M/UL — LOW (ref 4.7–6.1)
RBC # FLD: 13.8 % — SIGNIFICANT CHANGE UP (ref 11.5–14.5)
SODIUM SERPL-SCNC: 135 MMOL/L — SIGNIFICANT CHANGE UP (ref 135–146)
WBC # BLD: 6.02 K/UL — SIGNIFICANT CHANGE UP (ref 4.8–10.8)
WBC # FLD AUTO: 6.02 K/UL — SIGNIFICANT CHANGE UP (ref 4.8–10.8)

## 2021-06-21 RX ADMIN — Medication 81 MILLIGRAM(S): at 11:29

## 2021-06-21 RX ADMIN — PANTOPRAZOLE SODIUM 40 MILLIGRAM(S): 20 TABLET, DELAYED RELEASE ORAL at 06:24

## 2021-06-21 RX ADMIN — AMLODIPINE BESYLATE 2.5 MILLIGRAM(S): 2.5 TABLET ORAL at 05:50

## 2021-06-21 RX ADMIN — ENOXAPARIN SODIUM 40 MILLIGRAM(S): 100 INJECTION SUBCUTANEOUS at 21:26

## 2021-06-21 RX ADMIN — Medication 975 MILLIGRAM(S): at 11:30

## 2021-06-21 RX ADMIN — Medication 975 MILLIGRAM(S): at 13:08

## 2021-06-21 RX ADMIN — ATORVASTATIN CALCIUM 80 MILLIGRAM(S): 80 TABLET, FILM COATED ORAL at 21:26

## 2021-06-21 RX ADMIN — Medication 975 MILLIGRAM(S): at 21:31

## 2021-06-21 RX ADMIN — Medication 975 MILLIGRAM(S): at 21:32

## 2021-06-21 NOTE — PROGRESS NOTE ADULT - SUBJECTIVE AND OBJECTIVE BOX
Patient is a 55y old  Male who presents with a chief complaint of CVA (09 Jun 2021 10:47)      HPI:  55 year-old male with a PMHx of hyperlipidemia and hypertension (non-compliant with meds) who presented to ED on 6/3/21 with complaint of headache, weakness, decreased sensation on the Right side of his face, and dizziness since 1 AM. Patient states he felt perfectly fine when he went to bed on evening of 6/2/21. NIHSS 3 on arrival to ED. Out of window for tPA. CT Head without contrast negative for acute intracranial hemorrhage or large territorial infarct, with 1 cm dural-based lesion of the Left parietal convexity, likely reflecting a meningioma. 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 Patient admitted to Neurology service for management of clinically diagnosed CVA. Patient started on Aspirin and high-intensity statin. +Patent foramen ovale on TTE. Patient with apparent Right-sided vocal cord paralysis, initially kept NPO with NG tube in place. SLP evaluated patient with Modified Barium Swallow, and patient cleared for regular diet with thin liquids the day of transfer to Rehab.    The patient was evaluated by PT, and required contact guard assist-minimal assist with bed mobility, minimal-moderate assist with transfers, and moderate assist with ambulation 25 ft x2 (once with RW and then holding onto wall railing). Prior to admission, patient was fully independent in ADLs and ambulation without an assistive device. The patient was evaluated by the Physiatry team on the Neurology service and was found to be a good candidate for acute inpatient rehab.    Prior level of function: Patient states he was independent with ADLs and ambulation without an assistive device.  Current level of function: Contact guard assist-minimal assist with bed mobility, minimal-moderate assist with transfers, and moderate assist with ambulation 25 ft x2 (once with RW and then holding onto wall railing). (08 Jun 2021 15:51)    Current level of function: Set up bed mobility, supervision with transfers, supervision to ambulate 150 ft x2 with RW, and contact guard assist to ascend 8 steps x2 with 1 HR (21 June 2021).    TODAY'S SUBJECTIVE & REVIEW OF SYMPTOMS:  Patient seen in the gym with Dr. Balbuena. No acute overnight events. VSS. Patient's dizziness improving     Review of Systems:   Constitutional:    [ x ] WNL           [   ] poor appetite   [   ] insomnia   [   ] tired   Cardio:                [ x ] WNL           [   ] CP   [   ] ETIENNE   [   ] palpitations               Resp:                   [ x ] WNL           [   ] SOB   [  ] cough   [   ] wheezing   GI:                        [ x ] WNL           [   ] constipation   [   ] diarrhea   [   ] abdominal pain   [   ] nausea   [   ] emesis                                :                      [ x ] WNL           [   ] WESTBROOK  [   ] dysuria   [   ] difficulty voiding             Endo:                   [ x ] WNL          [   ] polyuria   [   ] temperature intolerance                 Skin:                     [ x ] WNL          [   ] pain   [   ] wound   [   ] rash   MSK:                    [ x ] WNL          [   ] muscle pain   [   ] joint pain/ stiffness   [   ] muscle tenderness   [   ] swelling   Neuro:                 [  ] WNL          [ x ] Right-sided HA improving  [   ] change in vision   [   ] tremor   [ x ] ataxia on right side more than weakness   [   ]dysphagia              Cognitive:           [ x ] WNL           [   ]confusion      Psych:                  [ x ] WNL           [   ] hallucinations   [   ]agitation   [   ] delusion   [   ]depression      PHYSICAL EXAM  Vital Signs Last 24 Hrs  T(C): 36.7 (21 Jun 2021 05:27), Max: 36.7 (20 Jun 2021 22:24)  T(F): 98 (21 Jun 2021 05:27), Max: 98 (20 Jun 2021 22:24)  HR: 88 (21 Jun 2021 05:27) (88 - 101)  BP: 131/63 (21 Jun 2021 05:27) (125/75 - 144/87)  BP(mean): --  RR: 18 (21 Jun 2021 05:27) (18 - 18)  SpO2: 97% (20 Jun 2021 23:00) (97% - 97%)    General:[ x ] NAD, Resting Comfortable,   [   ] other:                                HEENT: [ x ] NC/AT, EOMI, PERRL , Normal Conjunctivae,   [ x  ] other: vertical nystagmus  Cardio: [ x ] RRR, no murmur,   [   ] other:                              Pulm: [ x ] No Respiratory Distress,  Lungs CTAB,   [   ] other:                       Abdomen: [ x ]ND/NT, Soft,   [   ] other:    : [ x ] NO WESTBROOK CATHETER, [   ] WESTBROOK CATHETER- no meatal tear, no discharge, [   ] other:                                            MSK: [ x ] No joint swelling, Full ROM,   [   ] other:                                         Ext: [ x ]No C/C/E, No calf tenderness,   [   ]other:    Skin: [ x ]intact,   [   ] other:                                                                   Neurological Examination:  Cognitive: [ x ] AAO x 3,   [    ]  other:                                                                      Attention:  [ x ] intact,   [    ]  other:                            Memory: [ x ] intact,    [    ]  other:     Mood/Affect: [ x ] wnl,    [    ]  other:                                                                             Communication: [ x ]Fluent, no dysarthria, following commands:  [    ] other:   CN II - XII:  [  ] intact,  [   x ] other: decreased sensation of right side of face, r sided facial droop; weakness of right eye closing                                                                                         Motor:   RIGHT UE: [   ] WNL,  [ x ] other: 4/5 shoulder abduction, elbow flexion/extension, and hand   LEFT    UE: [   ] WNL,  [ x ] other: 4/5 shoulder abduction, elbow flexion/extension, and hand   RIGHT LE: [   ] WNL,  [ x ] other: 4/5 hip flexion, knee flexion/extension, ankle dorsiflexion/plantarflexion  LEFT    LE: [   ] WNL,  [ x ] other: 4/5 hip flexion, knee flexion/extension, ankle dorsiflexion/plantarflexion    Tone: [ x ] wnl,   [    ]  other:  DTRs: [ x ]symmetric, [   ] other:  Coordination:   [ x ] intact, with bilateral finger-to-nose   [    ] other:                                                                           Sensory: [  ] Intact to light touch,   [ x ] other: intact to light touch for bilateral upper and lower extremities, but decreased sensation over Right ophthalmic, maxillary, and mandibular areas.    MEDICATIONS  (STANDING):  amLODIPine   Tablet 2.5 milliGRAM(s) Oral daily  aspirin  chewable 81 milliGRAM(s) Oral daily  atorvastatin 80 milliGRAM(s) Oral at bedtime  enoxaparin Injectable 40 milliGRAM(s) SubCutaneous at bedtime  pantoprazole    Tablet 40 milliGRAM(s) Oral before breakfast    MEDICATIONS  (PRN):  acetaminophen   Tablet .. 975 milliGRAM(s) Oral every 6 hours PRN Temp greater or equal to 38C (100.4F), Mild Pain (1 - 3), Moderate Pain (4 - 6), Severe Pain (7 - 10)  aluminum hydroxide/magnesium hydroxide/simethicone Suspension 30 milliLiter(s) Oral every 4 hours PRN Dyspepsia  indomethacin 25 milliGRAM(s) Oral every 12 hours PRN cluster headaches  meclizine 12.5 milliGRAM(s) Oral daily PRN Dizziness      LABS:                        12.3   6.02  )-----------( 425      ( 21 Jun 2021 06:21 )             38.0     06-21    135  |  99  |  16  ----------------------------<  109<H>  4.4   |  24  |  0.8    Ca    10.3<H>      21 Jun 2021 06:21  Mg     2.2     06-21    TPro  7.5  /  Alb  4.8  /  TBili  <0.2  /  DBili  x   /  AST  87<H>  /  ALT  122<H>  /  AlkPhos  113  06-21

## 2021-06-21 NOTE — PROGRESS NOTE ADULT - ATTENDING COMMENTS
Patient seen, examined and discussed with the resident. I have directed the care. He has suffered a pontine CVA with ataxia. He is improving. He is walking with PT with a rollator walker. Meclizine can be tried for his vertigo. He continues to require acute rehab with 3 hours of daily therapies. Patient seen, examined and discussed with the resident. I have directed the care. He has suffered a pontine CVA with ataxia. He is improving. He is walking with PT with a rollator walker. Meclizine can be tried for his vertigo. He continues to require acute rehab with 3 hours of daily therapies. I edited the note.

## 2021-06-21 NOTE — PROGRESS NOTE ADULT - ASSESSMENT
Rehab of clinically diagnosed CVA, right cerebellar penumbra, not seen on MRI by radiology but seen by neurologist, with impaired balance, unsteady gait, Right-sided facial numbness, and dysphagia. NIHSS 3. mRS 4. Neuro./Dr. Dow reviewed the MRI he sees a right pontine CVA.     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 Structural Cardio f/u as outpatient    #Hepatitis C Ab positive  - Hepatitis B Surface Ab and Core Ab positive, Hepatitis B Surface Ag negative  - Hepatitis C RNA PCR negative  - patient states he was worked up in the past and received some treatment for this one year ago in [place]. He was told that he did well with the treatment he received.  Outpatient GI F/U     #Vertigo, improving  - His BP is good when standing with PT.  - Meclizine 12.5 mg PO daily PRN for dizziness added. He reports some subjective vertigo.    # Dysphagia  - ENT previously consulted, noted laryngeal Right-sided vocal cord paralysis  - s/p NGT  - MBSS performed, patient cleared for DASH/TLC regular consistency diet by SLP on 6/8/21    #Hyperlipidemia  - Atorvastatin 80 mg PO nightly    #Hypertension  - Amlodipine 2.5 mg PO daily    #Headache  - as per neurology-on indomethacin and O2 for cluster headache. Headache stable.      -Pain control: Tylenol PRN    -GI/Bowel Mgmt: Pantoprazole 40 mg PO daily before breakfast on Indomethacin    -Bladder management: Patient continent of bladder     -Skin:  No active issues at this time    -FEN     - Diet: DASH/TLC, regular consistency solids with thin liquids     Precautions / PROPHYLAXIS:      - Falls    - Weight bearing status: WBAT      - DVT prophylaxis: Lovenox 40 mg SubQ daily     Rehab of clinically diagnosed CVA, right cerebellar penumbra, not seen on MRI by radiology but seen by neurologist, with impaired balance, unsteady gait, Right-sided facial numbness, and dysphagia. NIHSS 3. mRS 4. Neuro./Dr. Dow reviewed the MRI he sees a right pontine CVA.     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 Structural Cardio f/u as outpatient    #Hepatitis C Ab positive  - Hepatitis B Surface Ab and Core Ab positive, Hepatitis B Surface Ag negative  - Hepatitis C RNA PCR negative  - patient states he was worked up in the past and received some treatment for this one year ago in [place]. He was told that he did well with the treatment he received.  Outpatient GI F/U     #Vertigo, improving  - His BP is good when standing with PT.  - Meclizine 12.5 mg PO daily PRN for dizziness added. He reports some subjective vertigo.    # Dysphagia  - ENT previously consulted, noted laryngeal Right-sided vocal cord paralysis  - s/p NGT  - MBSS performed, patient cleared for DASH/TLC regular consistency diet by SLP on 6/8/21    #Hyperlipidemia  - Atorvastatin 80 mg PO nightly    #Hypertension  - Amlodipine 2.5 mg PO daily    #Headache  - as per neurology-on indomethacin as needed and O2 as needed while in the hospital for cluster headache. Headache stable.      -Pain control: Tylenol PRN    -GI/Bowel Mgmt: Pantoprazole 40 mg PO daily before breakfast on Indomethacin    -Bladder management: Patient continent of bladder     -Skin:  No active issues at this time    -FEN     - Diet: DASH/TLC, regular consistency solids with thin liquids     Precautions / PROPHYLAXIS:      - Falls    - Weight bearing status: WBAT      - DVT prophylaxis: Lovenox 40 mg SubQ daily

## 2021-06-22 RX ORDER — MECLIZINE HCL 12.5 MG
12.5 TABLET ORAL EVERY 8 HOURS
Refills: 0 | Status: DISCONTINUED | OUTPATIENT
Start: 2021-06-22 | End: 2021-06-29

## 2021-06-22 RX ORDER — AMLODIPINE BESYLATE 2.5 MG/1
2.5 TABLET ORAL AT BEDTIME
Refills: 0 | Status: DISCONTINUED | OUTPATIENT
Start: 2021-06-23 | End: 2021-06-29

## 2021-06-22 RX ORDER — MECLIZINE HCL 12.5 MG
12.5 TABLET ORAL EVERY 8 HOURS
Refills: 0 | Status: DISCONTINUED | OUTPATIENT
Start: 2021-06-22 | End: 2021-06-22

## 2021-06-22 RX ADMIN — Medication 975 MILLIGRAM(S): at 21:57

## 2021-06-22 RX ADMIN — Medication 975 MILLIGRAM(S): at 06:09

## 2021-06-22 RX ADMIN — AMLODIPINE BESYLATE 2.5 MILLIGRAM(S): 2.5 TABLET ORAL at 06:04

## 2021-06-22 RX ADMIN — Medication 12.5 MILLIGRAM(S): at 12:24

## 2021-06-22 RX ADMIN — Medication 81 MILLIGRAM(S): at 11:59

## 2021-06-22 RX ADMIN — PANTOPRAZOLE SODIUM 40 MILLIGRAM(S): 20 TABLET, DELAYED RELEASE ORAL at 06:04

## 2021-06-22 RX ADMIN — Medication 12.5 MILLIGRAM(S): at 21:52

## 2021-06-22 RX ADMIN — ATORVASTATIN CALCIUM 80 MILLIGRAM(S): 80 TABLET, FILM COATED ORAL at 21:52

## 2021-06-22 RX ADMIN — ENOXAPARIN SODIUM 40 MILLIGRAM(S): 100 INJECTION SUBCUTANEOUS at 21:53

## 2021-06-22 RX ADMIN — Medication 975 MILLIGRAM(S): at 21:56

## 2021-06-22 NOTE — PROGRESS NOTE ADULT - ASSESSMENT
Rehab of clinically diagnosed CVA, right cerebellar penumbra, not seen on MRI by radiology but seen by neurologist, with impaired balance, unsteady gait, Right-sided facial numbness, and dysphagia. NIHSS 3. mRS 4. Neuro./Dr. Dow reviewed the MRI he sees a right pontine CVA.     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 Structural Cardio f/u as outpatient    #Hepatitis C Ab positive  - Hepatitis B Surface Ab and Core Ab positive, Hepatitis B Surface Ag negative  - Hepatitis C RNA PCR negative  - patient states he was worked up in the past and received some treatment for this one year ago in [place]. He was told that he did well with the treatment he received.  Outpatient GI F/U     #Vertigo, improving  - His BP is good when standing with PT.  - Meclizine 12.5 mg PO daily PRN for dizziness added. He reports some subjective vertigo.    # Dysphagia  - ENT previously consulted, noted laryngeal Right-sided vocal cord paralysis  - s/p NGT  - MBSS performed, patient cleared for DASH/TLC regular consistency diet by SLP on 6/8/21    #Hyperlipidemia  - Atorvastatin 80 mg PO nightly    #Hypertension  - Amlodipine 2.5 mg PO daily    #Headache  - as per neurology-on indomethacin as needed and O2 as needed while in the hospital for cluster headache. Headache stable.      -Pain control: Tylenol PRN    -GI/Bowel Mgmt: Pantoprazole 40 mg PO daily before breakfast on Indomethacin    -Bladder management: Patient continent of bladder     -Skin:  No active issues at this time    -FEN     - Diet: DASH/TLC, regular consistency solids with thin liquids     Precautions / PROPHYLAXIS:      - Falls    - Weight bearing status: WBAT      - DVT prophylaxis: Lovenox 40 mg SubQ daily     Rehab of clinically diagnosed CVA, right cerebellar penumbra, not seen on MRI by radiology but seen by neurologist, with impaired balance, unsteady gait, Right-sided facial numbness, and dysphagia. NIHSS 3. mRS 4. Neuro./Dr. Dow reviewed the MRI he sees a right pontine CVA.     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 Structural Cardio f/u as outpatient    #Hepatitis C Ab positive  - Hepatitis B Surface Ab and Core Ab positive, Hepatitis B Surface Ag negative  - Hepatitis C RNA PCR negative  - patient states he was worked up in the past and received some treatment for this one year ago in [place]. He was told that he did well with the treatment he received.  Outpatient GI F/U     #Vertigo, improving  - His BP is good when standing with PT.  - Meclizine 12.5 mg PO daily PRN for dizziness added. He reports some subjective vertigo.    # Dysphagia  - ENT previously consulted, noted laryngeal Right-sided vocal cord paralysis  - s/p NGT  - MBSS performed, patient cleared for DASH/TLC regular consistency diet by SLP on 6/8/21    #Hyperlipidemia  - Atorvastatin 80 mg PO nightly    #Hypertension  - Amlodipine 2.5 mg PO qhs.    #Headache  - as per neurology-on indomethacin as needed and O2 as needed while in the hospital for cluster headache. Headache stable.      -Pain control: Tylenol PRN    -GI/Bowel Mgmt: Pantoprazole 40 mg PO daily before breakfast on Indomethacin    -Bladder management: Patient continent of bladder     -Skin:  No active issues at this time    -FEN     - Diet: DASH/TLC, regular consistency solids with thin liquids     Precautions / PROPHYLAXIS:      - Falls    - Weight bearing status: WBAT      - DVT prophylaxis: Lovenox 40 mg SubQ daily

## 2021-06-22 NOTE — PROGRESS NOTE ADULT - ATTENDING COMMENTS
Patient seen, examined and discussed with the resident. I have directed the care. He has suffered a pontine CVA with ataxia. He is improving. He is walking with PT with a rollator walker. Meclizine can be tried for his vertigo. He continues to require acute rehab with 3 hours of daily therapies. I edited the note. Patient seen, examined and discussed with the resident. I have directed the care. He has suffered a pontine CVA with ataxia. He is improving. He is walking with PT with a rollator walker. He is working on stairs with a straight cane. Meclizine can be tried for his vertigo. I will change the Norvac to 2.5 mg po qhs. His BP is stable. His BP is stable in PT with ambulation. I saw him walking in PT. He continues to require acute rehab with 3 hours of daily therapies. I edited the note. Patient seen, examined and discussed with the resident. I have directed the care. He has suffered a pontine CVA with ataxia. He is improving. He is walking with PT with a rollator walker. He is working on stairs with a straight cane. Meclizine can be tried for his vertigo. I will change the Norvac to 2.5 mg po qhs. His BP is stable. His BP is stable in PT with ambulation. I observed him walking in PT. He continues to require acute rehab with 3 hours of daily therapies. I edited the note.

## 2021-06-22 NOTE — PROGRESS NOTE ADULT - SUBJECTIVE AND OBJECTIVE BOX
Patient is a 55y old  Male who presents with a chief complaint of CVA (09 Jun 2021 10:47)      HPI:  55 year-old male with a PMHx of hyperlipidemia and hypertension (non-compliant with meds) who presented to ED on 6/3/21 with complaint of headache, weakness, decreased sensation on the Right side of his face, and dizziness since 1 AM. Patient states he felt perfectly fine when he went to bed on evening of 6/2/21. NIHSS 3 on arrival to ED. Out of window for tPA. CT Head without contrast negative for acute intracranial hemorrhage or large territorial infarct, with 1 cm dural-based lesion of the Left parietal convexity, likely reflecting a meningioma. 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 Patient admitted to Neurology service for management of clinically diagnosed CVA. Patient started on Aspirin and high-intensity statin. +Patent foramen ovale on TTE. Patient with apparent Right-sided vocal cord paralysis, initially kept NPO with NG tube in place. SLP evaluated patient with Modified Barium Swallow, and patient cleared for regular diet with thin liquids the day of transfer to Rehab.    The patient was evaluated by PT, and required contact guard assist-minimal assist with bed mobility, minimal-moderate assist with transfers, and moderate assist with ambulation 25 ft x2 (once with RW and then holding onto wall railing). Prior to admission, patient was fully independent in ADLs and ambulation without an assistive device. The patient was evaluated by the Physiatry team on the Neurology service and was found to be a good candidate for acute inpatient rehab.    Prior level of function: Patient states he was independent with ADLs and ambulation without an assistive device.  Current level of function: Contact guard assist-minimal assist with bed mobility, minimal-moderate assist with transfers, and moderate assist with ambulation 25 ft x2 (once with RW and then holding onto wall railing). (08 Jun 2021 15:51)    Current level of function: Set up bed mobility, supervision with transfers, supervision to ambulate 150 ft x2 with RW, and TA to ascend 12 steps with 1 HR w/ SC (21 June 2021).    TODAY'S SUBJECTIVE & REVIEW OF SYMPTOMS:  Patient seen in the gym with Dr. Balbuena. No acute overnight events. VSS. Patient's dizziness improving. Doing 12 steps TA with SC     Review of Systems:   Constitutional:    [ x ] WNL           [   ] poor appetite   [   ] insomnia   [   ] tired   Cardio:                [ x ] WNL           [   ] CP   [   ] ETIENNE   [   ] palpitations               Resp:                   [ x ] WNL           [   ] SOB   [  ] cough   [   ] wheezing   GI:                        [ x ] WNL           [   ] constipation   [   ] diarrhea   [   ] abdominal pain   [   ] nausea   [   ] emesis                                :                      [ x ] WNL           [   ] WESTBROOK  [   ] dysuria   [   ] difficulty voiding             Endo:                   [ x ] WNL          [   ] polyuria   [   ] temperature intolerance                 Skin:                     [ x ] WNL          [   ] pain   [   ] wound   [   ] rash   MSK:                    [ x ] WNL          [   ] muscle pain   [   ] joint pain/ stiffness   [   ] muscle tenderness   [   ] swelling   Neuro:                 [  ] WNL          [ x ] Right-sided HA improving  [   ] change in vision   [   ] tremor   [ x ] ataxia on right side more than weakness   [   ]dysphagia              Cognitive:           [ x ] WNL           [   ]confusion      Psych:                  [ x ] WNL           [   ] hallucinations   [   ]agitation   [   ] delusion   [   ]depression      PHYSICAL EXAM  Vital Signs Last 24 Hrs  T(C): 36.3 (22 Jun 2021 05:06), Max: 36.3 (21 Jun 2021 20:34)  T(F): 97.3 (22 Jun 2021 05:06), Max: 97.3 (21 Jun 2021 20:34)  HR: 88 (22 Jun 2021 05:06) (88 - 91)  BP: 110/57 (22 Jun 2021 05:06) (108/63 - 159/89)  BP(mean): --  RR: 18 (22 Jun 2021 05:06) (18 - 18)  SpO2: --    General:[ x ] NAD, Resting Comfortable,   [   ] other:                                HEENT: [ x ] NC/AT, EOMI, PERRL , Normal Conjunctivae,   [ x  ] other: vertical nystagmus  Cardio: [ x ] RRR, no murmur,   [   ] other:                              Pulm: [ x ] No Respiratory Distress,  Lungs CTAB,   [   ] other:                       Abdomen: [ x ]ND/NT, Soft,   [   ] other:    : [ x ] NO WESTBROOK CATHETER, [   ] WESTBROOK CATHETER- no meatal tear, no discharge, [   ] other:                                            MSK: [ x ] No joint swelling, Full ROM,   [   ] other:                                         Ext: [ x ]No C/C/E, No calf tenderness,   [   ]other:    Skin: [ x ]intact,   [   ] other:                                                                   Neurological Examination:  Cognitive: [ x ] AAO x 3,   [    ]  other:                                                                      Attention:  [ x ] intact,   [    ]  other:                            Memory: [ x ] intact,    [    ]  other:     Mood/Affect: [ x ] wnl,    [    ]  other:                                                                             Communication: [ x ]Fluent, no dysarthria, following commands:  [    ] other:   CN II - XII:  [  ] intact,  [   x ] other: decreased sensation of right side of face, r sided facial droop; weakness of right eye closing                                                                                         Motor:   RIGHT UE: [   ] WNL,  [ x ] other: 4/5 shoulder abduction, elbow flexion/extension, and hand   LEFT    UE: [   ] WNL,  [ x ] other: 4/5 shoulder abduction, elbow flexion/extension, and hand   RIGHT LE: [   ] WNL,  [ x ] other: 4/5 hip flexion, knee flexion/extension, ankle dorsiflexion/plantarflexion  LEFT    LE: [   ] WNL,  [ x ] other: 4/5 hip flexion, knee flexion/extension, ankle dorsiflexion/plantarflexion    Tone: [ x ] wnl,   [    ]  other:  DTRs: [ x ]symmetric, [   ] other:  Coordination:   [ x ] intact, with bilateral finger-to-nose   [    ] other:                                                                           Sensory: [  ] Intact to light touch,   [ x ] other: intact to light touch for bilateral upper and lower extremities, but decreased sensation over Right ophthalmic, maxillary, and mandibular areas.    MEDICATIONS  (STANDING):  amLODIPine   Tablet 2.5 milliGRAM(s) Oral daily  aspirin  chewable 81 milliGRAM(s) Oral daily  atorvastatin 80 milliGRAM(s) Oral at bedtime  enoxaparin Injectable 40 milliGRAM(s) SubCutaneous at bedtime  meclizine 12.5 milliGRAM(s) Oral every 8 hours  pantoprazole    Tablet 40 milliGRAM(s) Oral before breakfast    MEDICATIONS  (PRN):  acetaminophen   Tablet .. 975 milliGRAM(s) Oral every 6 hours PRN Temp greater or equal to 38C (100.4F), Mild Pain (1 - 3), Moderate Pain (4 - 6), Severe Pain (7 - 10)  aluminum hydroxide/magnesium hydroxide/simethicone Suspension 30 milliLiter(s) Oral every 4 hours PRN Dyspepsia  indomethacin 25 milliGRAM(s) Oral every 12 hours PRN cluster headaches        LABS:                        12.3   6.02  )-----------( 425      ( 21 Jun 2021 06:21 )             38.0     06-21    135  |  99  |  16  ----------------------------<  109<H>  4.4   |  24  |  0.8    Ca    10.3<H>      21 Jun 2021 06:21  Mg     2.2     06-21    TPro  7.5  /  Alb  4.8  /  TBili  <0.2  /  DBili  x   /  AST  87<H>  /  ALT  122<H>  /  AlkPhos  113  06-21

## 2021-06-23 LAB
ALBUMIN SERPL ELPH-MCNC: 4.6 G/DL — SIGNIFICANT CHANGE UP (ref 3.5–5.2)
ALP SERPL-CCNC: 113 U/L — SIGNIFICANT CHANGE UP (ref 30–115)
ALT FLD-CCNC: 92 U/L — HIGH (ref 0–41)
ANION GAP SERPL CALC-SCNC: 10 MMOL/L — SIGNIFICANT CHANGE UP (ref 7–14)
AST SERPL-CCNC: 50 U/L — HIGH (ref 0–41)
BILIRUB SERPL-MCNC: <0.2 MG/DL — SIGNIFICANT CHANGE UP (ref 0.2–1.2)
BUN SERPL-MCNC: 20 MG/DL — SIGNIFICANT CHANGE UP (ref 10–20)
CALCIUM SERPL-MCNC: 10.5 MG/DL — HIGH (ref 8.5–10.1)
CHLORIDE SERPL-SCNC: 104 MMOL/L — SIGNIFICANT CHANGE UP (ref 98–110)
CO2 SERPL-SCNC: 29 MMOL/L — SIGNIFICANT CHANGE UP (ref 17–32)
CREAT SERPL-MCNC: 0.9 MG/DL — SIGNIFICANT CHANGE UP (ref 0.7–1.5)
GLUCOSE SERPL-MCNC: 110 MG/DL — HIGH (ref 70–99)
POTASSIUM SERPL-MCNC: 5 MMOL/L — SIGNIFICANT CHANGE UP (ref 3.5–5)
POTASSIUM SERPL-SCNC: 5 MMOL/L — SIGNIFICANT CHANGE UP (ref 3.5–5)
PROT SERPL-MCNC: 7.6 G/DL — SIGNIFICANT CHANGE UP (ref 6–8)
SODIUM SERPL-SCNC: 143 MMOL/L — SIGNIFICANT CHANGE UP (ref 135–146)

## 2021-06-23 RX ADMIN — Medication 12.5 MILLIGRAM(S): at 21:56

## 2021-06-23 RX ADMIN — Medication 81 MILLIGRAM(S): at 13:41

## 2021-06-23 RX ADMIN — Medication 12.5 MILLIGRAM(S): at 13:37

## 2021-06-23 RX ADMIN — ENOXAPARIN SODIUM 40 MILLIGRAM(S): 100 INJECTION SUBCUTANEOUS at 21:56

## 2021-06-23 RX ADMIN — Medication 12.5 MILLIGRAM(S): at 06:36

## 2021-06-23 RX ADMIN — PANTOPRAZOLE SODIUM 40 MILLIGRAM(S): 20 TABLET, DELAYED RELEASE ORAL at 06:36

## 2021-06-23 RX ADMIN — Medication 25 MILLIGRAM(S): at 18:56

## 2021-06-23 RX ADMIN — AMLODIPINE BESYLATE 2.5 MILLIGRAM(S): 2.5 TABLET ORAL at 21:56

## 2021-06-23 RX ADMIN — ATORVASTATIN CALCIUM 80 MILLIGRAM(S): 80 TABLET, FILM COATED ORAL at 21:56

## 2021-06-23 NOTE — PROGRESS NOTE ADULT - SUBJECTIVE AND OBJECTIVE BOX
Patient is a 55y old  Male who presents with a chief complaint of CVA (09 Jun 2021 10:47)      HPI:  55 year-old male with a PMHx of hyperlipidemia and hypertension (non-compliant with meds) who presented to ED on 6/3/21 with complaint of headache, weakness, decreased sensation on the Right side of his face, and dizziness since 1 AM. Patient states he felt perfectly fine when he went to bed on evening of 6/2/21. NIHSS 3 on arrival to ED. Out of window for tPA. CT Head without contrast negative for acute intracranial hemorrhage or large territorial infarct, with 1 cm dural-based lesion of the Left parietal convexity, likely reflecting a meningioma. 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 Patient admitted to Neurology service for management of clinically diagnosed CVA. Patient started on Aspirin and high-intensity statin. +Patent foramen ovale on TTE. Patient with apparent Right-sided vocal cord paralysis, initially kept NPO with NG tube in place. SLP evaluated patient with Modified Barium Swallow, and patient cleared for regular diet with thin liquids the day of transfer to Rehab.    The patient was evaluated by PT, and required contact guard assist-minimal assist with bed mobility, minimal-moderate assist with transfers, and moderate assist with ambulation 25 ft x2 (once with RW and then holding onto wall railing). Prior to admission, patient was fully independent in ADLs and ambulation without an assistive device. The patient was evaluated by the Physiatry team on the Neurology service and was found to be a good candidate for acute inpatient rehab.    Prior level of function: Patient states he was independent with ADLs and ambulation without an assistive device.  Current level of function: Contact guard assist-minimal assist with bed mobility, minimal-moderate assist with transfers, and moderate assist with ambulation 25 ft x2 (once with RW and then holding onto wall railing). (08 Jun 2021 15:51)    Current level of function: Set up bed mobility, supervision with transfers, supervision to ambulate 150 ft x2 with RW, and TA to ascend 12 steps with 1 HR w/ SC (21 June 2021).    TODAY'S SUBJECTIVE & REVIEW OF SYMPTOMS:  Patient seen and examined at bedside with Dr. Balbuena. No acute overnight events. Patient's dizziness improving. Patient notes his headache is much better today. Denies any new complaints.     Review of Systems:   Constitutional:    [ x ] WNL           [   ] poor appetite   [   ] insomnia   [   ] tired   Cardio:                [ x ] WNL           [   ] CP   [   ] ETIENNE   [   ] palpitations               Resp:                   [ x ] WNL           [   ] SOB   [  ] cough   [   ] wheezing   GI:                        [ x ] WNL           [   ] constipation   [   ] diarrhea   [   ] abdominal pain   [   ] nausea   [   ] emesis                                :                      [ x ] WNL           [   ] WESTBROOK  [   ] dysuria   [   ] difficulty voiding             Endo:                   [ x ] WNL          [   ] polyuria   [   ] temperature intolerance                 Skin:                     [ x ] WNL          [   ] pain   [   ] wound   [   ] rash   MSK:                    [ x ] WNL          [   ] muscle pain   [   ] joint pain/ stiffness   [   ] muscle tenderness   [   ] swelling   Neuro:                 [  ] WNL          [ x ] Right-sided HA improving  [   ] change in vision   [   ] tremor   [ x ] ataxia on right side more than weakness   [   ]dysphagia              Cognitive:           [ x ] WNL           [   ]confusion      Psych:                  [ x ] WNL           [   ] hallucinations   [   ]agitation   [   ] delusion   [   ]depression      PHYSICAL EXAM    Vital Signs Last 24 Hrs  T(C): 36.5 (23 Jun 2021 05:40), Max: 36.5 (23 Jun 2021 05:40)  T(F): 97.7 (23 Jun 2021 05:40), Max: 97.7 (23 Jun 2021 05:40)  HR: 89 (23 Jun 2021 05:40) (84 - 93)  BP: 106/60 (23 Jun 2021 05:40) (106/60 - 132/87)  BP(mean): --  RR: 18 (23 Jun 2021 05:40) (18 - 18)  SpO2: --    General:[ x ] NAD, Resting Comfortable,   [   ] other:                                HEENT: [ x ] NC/AT, EOMI, PERRL , Normal Conjunctivae,   [ x  ] other: vertical nystagmus  Cardio: [ x ] RRR, no murmur,   [   ] other:                              Pulm: [ x ] No Respiratory Distress,  Lungs CTAB,   [   ] other:                       Abdomen: [ x ]ND/NT, Soft,   [   ] other:    : [ x ] NO WESTBROOK CATHETER, [   ] WESTBROOK CATHETER- no meatal tear, no discharge, [   ] other:                                            MSK: [ x ] No joint swelling, Full ROM,   [   ] other:                                         Ext: [ x ]No C/C/E, No calf tenderness,   [   ]other:    Skin: [ x ]intact,   [   ] other:                                                                   Neurological Examination:  Cognitive: [ x ] AAO x 3,   [    ]  other:                                                                      Attention:  [ x ] intact,   [    ]  other:                            Memory: [ x ] intact,    [    ]  other:     Mood/Affect: [ x ] wnl,    [    ]  other:                                                                             Communication: [ x ]Fluent, no dysarthria, following commands:  [    ] other:   CN II - XII:  [  ] intact,  [   x ] other: decreased sensation of right side of face, r sided facial droop; weakness of right eye closing                                                                                         Motor:   RIGHT UE: [   ] WNL,  [ x ] other: 4/5 shoulder abduction, elbow flexion/extension, and hand   LEFT    UE: [   ] WNL,  [ x ] other: 4/5 shoulder abduction, elbow flexion/extension, and hand   RIGHT LE: [   ] WNL,  [ x ] other: 4/5 hip flexion, knee flexion/extension, ankle dorsiflexion/plantarflexion  LEFT    LE: [   ] WNL,  [ x ] other: 4/5 hip flexion, knee flexion/extension, ankle dorsiflexion/plantarflexion    Tone: [ x ] wnl,   [    ]  other:  DTRs: [ x ]symmetric, [   ] other:  Coordination:   [ x ] intact, with bilateral finger-to-nose   [    ] other:                                                                           Sensory: [  ] Intact to light touch,   [ x ] other: intact to light touch for bilateral upper and lower extremities, but decreased sensation over Right ophthalmic, maxillary, and mandibular areas.    MEDICATIONS  (STANDING):  amLODIPine   Tablet 2.5 milliGRAM(s) Oral at bedtime  aspirin  chewable 81 milliGRAM(s) Oral daily  atorvastatin 80 milliGRAM(s) Oral at bedtime  enoxaparin Injectable 40 milliGRAM(s) SubCutaneous at bedtime  meclizine 12.5 milliGRAM(s) Oral every 8 hours  pantoprazole    Tablet 40 milliGRAM(s) Oral before breakfast    MEDICATIONS  (PRN):  acetaminophen   Tablet .. 975 milliGRAM(s) Oral every 6 hours PRN Temp greater or equal to 38C (100.4F), Mild Pain (1 - 3), Moderate Pain (4 - 6), Severe Pain (7 - 10)  aluminum hydroxide/magnesium hydroxide/simethicone Suspension 30 milliLiter(s) Oral every 4 hours PRN Dyspepsia  indomethacin 25 milliGRAM(s) Oral every 12 hours PRN cluster headaches        LABS:        06-23    143  |  104  |  20  ----------------------------<  110<H>  5.0   |  29  |  0.9    Ca    10.5<H>      23 Jun 2021 08:05    TPro  7.6  /  Alb  4.6  /  TBili  <0.2  /  DBili  x   /  AST  50<H>  /  ALT  92<H>  /  AlkPhos  113  06-23        POCT Blood Glucose.: 134 mg/dL (06-19-21 @ 21:49)

## 2021-06-23 NOTE — PROGRESS NOTE ADULT - ASSESSMENT
Rehab of clinically diagnosed CVA, right cerebellar penumbra, not seen on MRI by radiology but seen by neurologist, with impaired balance, unsteady gait, Right-sided facial numbness, and dysphagia. NIHSS 3. mRS 4. Neuro./Dr. Dow reviewed the MRI he sees a right pontine CVA.     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 Structural Cardio f/u as outpatient    #Hepatitis C Ab positive  - Hepatitis B Surface Ab and Core Ab positive, Hepatitis B Surface Ag negative  - Hepatitis C RNA PCR negative  - patient states he was worked up in the past and received some treatment for this one year ago in [place]. He was told that he did well with the treatment he received.  Outpatient GI F/U     #Vertigo, improving  - His BP is good when standing with PT.  - Meclizine 12.5 mg PO daily PRN for dizziness added. He reports some subjective vertigo.    # Dysphagia  - ENT previously consulted, noted laryngeal Right-sided vocal cord paralysis  - s/p NGT  - MBSS performed, patient cleared for DASH/TLC regular consistency diet by SLP on 6/8/21    #Hyperlipidemia  - Atorvastatin 80 mg PO nightly    #Hypertension  - Amlodipine 2.5 mg PO qhs.    #Headache  - as per neurology-on indomethacin as needed and O2 as needed while in the hospital for cluster headache. Headache stable.      -Pain control: Tylenol PRN    -GI/Bowel Mgmt: Pantoprazole 40 mg PO daily before breakfast, on Indomethacin    -Bladder management: Patient continent of bladder     -Skin:  No active issues at this time    -FEN     - Diet: DASH/TLC, regular consistency solids with thin liquids     Precautions / PROPHYLAXIS:      - Falls    - Weight bearing status: WBAT      - DVT prophylaxis: Lovenox 40 mg SubQ daily

## 2021-06-23 NOTE — PROGRESS NOTE ADULT - ATTENDING COMMENTS
Patient seen, examined and discussed with the resident. I have directed the care. He has suffered a pontine CVA with ataxia. He is improving. He is walking with PT with a rollator walker. He is working on stairs with a straight cane. Meclizine can be tried for his vertigo. I will change the Norvac to 2.5 mg po qhs. His BP is stable. His BP is stable in PT with ambulation. I observed him walking in PT. He continues to require acute rehab with 3 hours of daily therapies. I edited the note. Patient seen, examined and discussed with the resident. I have directed the care. He has suffered a pontine CVA with ataxia. He is improving. He is walking with PT with a rollator walker and a cane. He is working on stairs with a straight cane. Meclizine can be tried for his vertigo. Norvasc 2.5 mg will start at bedtime. His BP is stable. His BP is stable in PT with ambulation. I observed him walking in PT. He continues to require acute rehab with 3 hours of daily therapies. I edited the note.

## 2021-06-24 ENCOUNTER — TRANSCRIPTION ENCOUNTER (OUTPATIENT)
Age: 56
End: 2021-06-24

## 2021-06-24 RX ADMIN — Medication 975 MILLIGRAM(S): at 07:55

## 2021-06-24 RX ADMIN — ENOXAPARIN SODIUM 40 MILLIGRAM(S): 100 INJECTION SUBCUTANEOUS at 21:18

## 2021-06-24 RX ADMIN — Medication 975 MILLIGRAM(S): at 21:22

## 2021-06-24 RX ADMIN — PANTOPRAZOLE SODIUM 40 MILLIGRAM(S): 20 TABLET, DELAYED RELEASE ORAL at 05:49

## 2021-06-24 RX ADMIN — ATORVASTATIN CALCIUM 80 MILLIGRAM(S): 80 TABLET, FILM COATED ORAL at 21:18

## 2021-06-24 RX ADMIN — Medication 975 MILLIGRAM(S): at 22:29

## 2021-06-24 RX ADMIN — AMLODIPINE BESYLATE 2.5 MILLIGRAM(S): 2.5 TABLET ORAL at 21:18

## 2021-06-24 RX ADMIN — Medication 975 MILLIGRAM(S): at 07:25

## 2021-06-24 RX ADMIN — Medication 81 MILLIGRAM(S): at 12:51

## 2021-06-24 RX ADMIN — Medication 12.5 MILLIGRAM(S): at 21:18

## 2021-06-24 RX ADMIN — Medication 12.5 MILLIGRAM(S): at 13:13

## 2021-06-24 RX ADMIN — Medication 25 MILLIGRAM(S): at 13:13

## 2021-06-24 RX ADMIN — Medication 12.5 MILLIGRAM(S): at 05:44

## 2021-06-24 NOTE — CHART NOTE - NSCHARTNOTEFT_GEN_A_CORE
#Rehab of clinically diagnosed CVA, right cerebellar penumbra    unable to see pt despite multiple attempts. spoke w/ PCA, reports po/appetite >75%. no chewing/swallowing difficulty. No GI discomfort, LBM 6/15 per pt at last assessment?? PCA unsure of recent BM - made aware to monitor. Alert; skin: WDL; no edema noted per RN flowsheets. Labs reviewed; meds reviewed. No new wts in EMR.    Recommendations:   1. Continue with current diet order.   2. Obtain daily wts     [id number]  pt not @ risk, RD to f/u in 7 days.   RD remains available: Alicia Brantley [phone].

## 2021-06-24 NOTE — PROGRESS NOTE ADULT - SUBJECTIVE AND OBJECTIVE BOX
Pain/Location: Right side of Face and Headache Ratin/10 Intervention: managed by medications per patient and he had taken them, overall pain decreasing.  Orientation: Oriented to Self/Place/Event/Time (Month/Date/Year/ Day/Time)   Arousal Level: Alert  Behavior: Pleasant and Cooperative   Affect Range: WFL    Needed: No       #: N/A   Treatment Session Focused on: Mood, Pain, cognition

## 2021-06-24 NOTE — PROGRESS NOTE ADULT - SUBJECTIVE AND OBJECTIVE BOX
Patient is a 55y old  Male who presents with a chief complaint of CVA (09 Jun 2021 10:47)      HPI:  55 year-old male with a PMHx of hyperlipidemia and hypertension (non-compliant with meds) who presented to ED on 6/3/21 with complaint of headache, weakness, decreased sensation on the Right side of his face, and dizziness since 1 AM. Patient states he felt perfectly fine when he went to bed on evening of 6/2/21. NIHSS 3 on arrival to ED. Out of window for tPA. CT Head without contrast negative for acute intracranial hemorrhage or large territorial infarct, with 1 cm dural-based lesion of the Left parietal convexity, likely reflecting a meningioma. 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 Patient admitted to Neurology service for management of clinically diagnosed CVA. Patient started on Aspirin and high-intensity statin. +Patent foramen ovale on TTE. Patient with apparent Right-sided vocal cord paralysis, initially kept NPO with NG tube in place. SLP evaluated patient with Modified Barium Swallow, and patient cleared for regular diet with thin liquids the day of transfer to Rehab.    The patient was evaluated by PT, and required contact guard assist-minimal assist with bed mobility, minimal-moderate assist with transfers, and moderate assist with ambulation 25 ft x2 (once with RW and then holding onto wall railing). Prior to admission, patient was fully independent in ADLs and ambulation without an assistive device. The patient was evaluated by the Physiatry team on the Neurology service and was found to be a good candidate for acute inpatient rehab.    Prior level of function: Patient states he was independent with ADLs and ambulation without an assistive device.  Current level of function: Contact guard assist-minimal assist with bed mobility, minimal-moderate assist with transfers, and moderate assist with ambulation 25 ft x2 (once with RW and then holding onto wall railing). (08 Jun 2021 15:51)    Current level of function: Set up bed mobility, supervision with transfers, supervision to ambulate 150 ft x2 with RW, and close supervision to ascend 16 steps with 1 HR (23 June 2021).    TODAY'S SUBJECTIVE & REVIEW OF SYMPTOMS:  Patient seen and examined at bedside with Dr. Balbuena. No acute overnight events. Patient notes his headache is much better overall. Denies any new complaints.     Review of Systems:   Constitutional:    [ x ] WNL           [   ] poor appetite   [   ] insomnia   [   ] tired   Cardio:                [ x ] WNL           [   ] CP   [   ] ETIENNE   [   ] palpitations               Resp:                   [ x ] WNL           [   ] SOB   [  ] cough   [   ] wheezing   GI:                        [ x ] WNL           [   ] constipation   [   ] diarrhea   [   ] abdominal pain   [   ] nausea   [   ] emesis                                :                      [ x ] WNL           [   ] WESTBROOK  [   ] dysuria   [   ] difficulty voiding             Endo:                   [ x ] WNL          [   ] polyuria   [   ] temperature intolerance                 Skin:                     [ x ] WNL          [   ] pain   [   ] wound   [   ] rash   MSK:                    [ x ] WNL          [   ] muscle pain   [   ] joint pain/ stiffness   [   ] muscle tenderness   [   ] swelling   Neuro:                 [  ] WNL          [ x ] Right-sided HA improving  [   ] change in vision   [   ] tremor   [ x ] ataxia on right side more than weakness   [   ]dysphagia              Cognitive:           [ x ] WNL           [   ]confusion      Psych:                  [ x ] WNL           [   ] hallucinations   [   ]agitation   [   ] delusion   [   ]depression      PHYSICAL EXAM    Vital Signs Last 24 Hrs  T(C): 35.7 (24 Jun 2021 06:01), Max: 36 (23 Jun 2021 21:45)  T(F): 96.3 (24 Jun 2021 06:01), Max: 96.8 (23 Jun 2021 21:45)  HR: 83 (24 Jun 2021 06:01) (83 - 85)  BP: 118/63 (24 Jun 2021 06:01) (118/63 - 130/82)  BP(mean): --  RR: 18 (24 Jun 2021 06:01) (18 - 18)  SpO2: --    General:[ x ] NAD, Resting Comfortable,   [   ] other:                                HEENT: [ x ] NC/AT, EOMI, PERRL , Normal Conjunctivae,   [ x  ] other: vertical nystagmus  Cardio: [ x ] RRR, no murmur,   [   ] other:                              Pulm: [ x ] No Respiratory Distress,  Lungs CTAB,   [   ] other:                       Abdomen: [ x ]ND/NT, Soft,   [   ] other:    : [ x ] NO WESTBROOK CATHETER, [   ] WESTBROOK CATHETER- no meatal tear, no discharge, [   ] other:                                            MSK: [ x ] No joint swelling, Full ROM,   [   ] other:                                         Ext: [ x ]No C/C/E, No calf tenderness,   [   ]other:    Skin: [ x ]intact,   [   ] other:                                                                   Neurological Examination:  Cognitive: [ x ] AAO x 3,   [    ]  other:                                                                      Attention:  [ x ] intact,   [    ]  other:                            Memory: [ x ] intact,    [    ]  other:     Mood/Affect: [ x ] wnl,    [    ]  other:                                                                             Communication: [ x ]Fluent, no dysarthria, following commands:  [    ] other:   CN II - XII:  [  ] intact,  [   x ] other: decreased sensation of right side of face, r sided facial droop; weakness of right eye closing                                                                                         Motor:   RIGHT UE: [   ] WNL,  [ x ] other: 4/5 shoulder abduction, elbow flexion/extension, and hand   LEFT    UE: [   ] WNL,  [ x ] other: 4/5 shoulder abduction, elbow flexion/extension, and hand   RIGHT LE: [   ] WNL,  [ x ] other: 4/5 hip flexion, knee flexion/extension, ankle dorsiflexion/plantarflexion  LEFT    LE: [   ] WNL,  [ x ] other: 4/5 hip flexion, knee flexion/extension, ankle dorsiflexion/plantarflexion    Tone: [ x ] wnl,   [    ]  other:  DTRs: [ x ]symmetric, [   ] other:  Coordination:   [ x ] intact, with bilateral finger-to-nose   [    ] other:                                                                           Sensory: [  ] Intact to light touch,   [ x ] other: intact to light touch for bilateral upper and lower extremities, but decreased sensation over Right ophthalmic, maxillary, and mandibular areas.    MEDICATIONS  (STANDING):  amLODIPine   Tablet 2.5 milliGRAM(s) Oral at bedtime  aspirin  chewable 81 milliGRAM(s) Oral daily  atorvastatin 80 milliGRAM(s) Oral at bedtime  enoxaparin Injectable 40 milliGRAM(s) SubCutaneous at bedtime  meclizine 12.5 milliGRAM(s) Oral every 8 hours  pantoprazole    Tablet 40 milliGRAM(s) Oral before breakfast    MEDICATIONS  (PRN):  acetaminophen   Tablet .. 975 milliGRAM(s) Oral every 6 hours PRN Temp greater or equal to 38C (100.4F), Mild Pain (1 - 3), Moderate Pain (4 - 6), Severe Pain (7 - 10)  aluminum hydroxide/magnesium hydroxide/simethicone Suspension 30 milliLiter(s) Oral every 4 hours PRN Dyspepsia  indomethacin 25 milliGRAM(s) Oral every 12 hours PRN cluster headaches      LABS:        06-23    143  |  104  |  20  ----------------------------<  110<H>  5.0   |  29  |  0.9    Ca    10.5<H>      23 Jun 2021 08:05    TPro  7.6  /  Alb  4.6  /  TBili  <0.2  /  DBili  x   /  AST  50<H>  /  ALT  92<H>  /  AlkPhos  113  06-23        POCT Blood Glucose.: 134 mg/dL (06-19-21 @ 21:49)

## 2021-06-24 NOTE — PROGRESS NOTE ADULT - ATTENDING COMMENTS
Patient seen, examined and discussed with the resident. I have directed the care. He has suffered a pontine CVA with ataxia. He is improving. He is walking with PT with a rollator walker and a cane. He is working on stairs with a straight cane. Meclizine can be tried for his vertigo. Norvasc 2.5 mg will start at bedtime. His BP is stable. His BP is stable in PT with ambulation. I observed him walking in PT. He continues to require acute rehab with 3 hours of daily therapies. I edited the note. Patient seen, examined and discussed with the resident. I have directed the care. He has suffered a pontine CVA with ataxia. He is improving. He is walking with PT with a rollator walker and a cane. He is working on stairs with a straight cane. Norvasc 2.5 mg is dosed at bedtime. I monitoring his vital signs carefully and they remain stable.  He continues to require acute rehab with 3 hours of daily therapies. I edited the note.

## 2021-06-24 NOTE — DISCHARGE NOTE NURSING/CASE MANAGEMENT/SOCIAL WORK - PATIENT PORTAL LINK FT
You can access the FollowMyHealth Patient Portal offered by [facility] by registering at the following website: [email or web address]. By joining [email or web address] FollowMyHealth portal, you will also be able to view your health information using other applications (apps) compatible with our system.

## 2021-06-24 NOTE — PROGRESS NOTE ADULT - ASSESSMENT
Rehab of clinically diagnosed CVA, right cerebellar penumbra, not seen on MRI by radiology but seen by neurologist, with impaired balance, unsteady gait, Right-sided facial numbness, and dysphagia. NIHSS 3. mRS 4. Neuro./Dr. Dow reviewed the MRI he sees a right pontine CVA.     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 Structural Cardio f/u as outpatient    #Hepatitis C Ab positive  - Hepatitis B Surface Ab and Core Ab positive, Hepatitis B Surface Ag negative  - Hepatitis C RNA PCR negative  - patient states he was worked up in the past and received some treatment for this one year ago in [place]. He was told that he did well with the treatment he received.  Outpatient GI F/U     #Vertigo, improving  - His BP is good when standing with PT.  - Meclizine 12.5 mg PO daily PRN for dizziness added. He reports some subjective vertigo.    # Dysphagia  - ENT previously consulted, noted laryngeal Right-sided vocal cord paralysis  - s/p NGT  - MBSS performed, patient cleared for DASH/TLC regular consistency diet by SLP on 6/8/21    #Hyperlipidemia  - Atorvastatin 80 mg PO nightly    #Hypertension  - Amlodipine 2.5 mg PO qhs.    #Headache  - as per neurology-on indomethacin as needed and O2 as needed while in the hospital for cluster headache. Headache stable.      -Pain control: Tylenol PRN    -GI/Bowel Mgmt: Pantoprazole 40 mg PO daily before breakfast, on Indomethacin    -Bladder management: Patient continent of bladder     -Skin:  No active issues at this time    -FEN     - Diet: DASH/TLC, regular consistency solids with thin liquids     Precautions / PROPHYLAXIS:      - Falls    - Weight bearing status: WBAT      - DVT prophylaxis: Lovenox 40 mg SubQ daily

## 2021-06-24 NOTE — DISCHARGE NOTE NURSING/CASE MANAGEMENT/SOCIAL WORK - NURSING SECTION COMPLETE
Daily Note     Today's date: 2019  Patient name: Cesar Huber  : 1997  MRN: [id number]  Referring provider: Jesús Win  Dx:   Encounter Diagnosis     ICD-10-CM    1  Right hip pain M25 551                   Subjective: Pt states she is feeling good but continues to have pain in anterior hip with flexion  Objective: See treatment diary below  Daily Treatment Diary     Manual          Lateral hip mobs grade III HK            Manual hip flex str             PROM R hip HK KO KO KO RK        Long axis distraction R LE HK KO KO KO RK        Manual hip flexor str 4x30" KO KO KO RK            Exercise Diary          Bike 10' 10' 10' 10' 10'        Clams 3x10 3x10 3x10 3x10 3x10        Bridging 3x30 3x10 3x10 3x10 3x10        Glute set 5"  x30 5"  x30 5"  x30 5"  x30 5"  x30        Prone quad str   Man Man         Hip flex str EOT   NP          Ab bracing 5"  x30 5"  x30 5"  x30 5"  x30 5"x30        Reverse clam shells 3x10 3x10 3x10 3x10 3x10        Prone hip ext 3x10 3x10 3x10 3x10 3x10        Glute raises 3x10 3x10 3x10 3x10 3x10        Piriformis str    np         Add sq With BR  3x10 With  Br  3x10 With  Br  3x10 With  Br  3x10 With BR  3x10        Standing hip abd 3x10 3x10 3x10          Stool IR/ER x30 x30 x30 x30 x30        Bridging with t-band abd purp  3x10   purp  3x10 NP Purp  3x10 purp  3x10        SLS w/alpha NP NP NP np         Ab bracing with hip flexion NP NP NP np           MHP at end of session  Post PT session - 15 min      Assessment: Tolerated treatment well  Patient demonstrated fatigue post treatment, exhibited good technique with therapeutic exercises and would benefit from continued PT  Performed all ex's within pt's tolerance, avoiding flexion  Plan: Continue per plan of care 
Patient/Caregiver provided printed discharge information.

## 2021-06-24 NOTE — PROGRESS NOTE ADULT - ASSESSMENT
Met with patient at bedside. Patient endorsed dizziness, headache, and numbness on the right side of his face. He endorsed significant improvement in pain and dizziness, however, it still takes him 10-15 minutes in the morning to stop feeling dizzy and be able to do anything. Patient also endorsed that his right side weakness was getting better but he still needed to work on his strength. Feedback provided from team conference and patient agreed that he had progressed well and hoped that by next week he may be more independent. He still had concerns about dizziness while walking and therapists have given him a rollator to sit when he felt dizzy. Patient endorsed feeling more mentally alert and denied any cognitive issues currently. Patient stated that his mood was "positive" and he expressed motivation and hopefulness that he will improve. Discussed adjustment to a disability as patient sometimes felt ‘why me’ and ‘what did I do wrong’. Talked to patient about family support and prayer for coping. Stroke education provided and discussed lifestyle and post stroke recovery. Continuum of care also discussed.  Discharge plan initiated and patient informed that  will review the information with him. Patient discussed with .     Goals: ? Facilitate Positive Coping ? Individual Psychotherapy ? Cognitive Assessment  Plan: 1-2 times a week 30-60 minutes ? Individual Psychotherapy ? Cognitive Assessment ? Family contact/support/education

## 2021-06-25 RX ADMIN — Medication 12.5 MILLIGRAM(S): at 21:31

## 2021-06-25 RX ADMIN — Medication 975 MILLIGRAM(S): at 09:00

## 2021-06-25 RX ADMIN — ENOXAPARIN SODIUM 40 MILLIGRAM(S): 100 INJECTION SUBCUTANEOUS at 21:31

## 2021-06-25 RX ADMIN — Medication 12.5 MILLIGRAM(S): at 05:24

## 2021-06-25 RX ADMIN — Medication 12.5 MILLIGRAM(S): at 12:51

## 2021-06-25 RX ADMIN — Medication 81 MILLIGRAM(S): at 12:51

## 2021-06-25 RX ADMIN — ATORVASTATIN CALCIUM 80 MILLIGRAM(S): 80 TABLET, FILM COATED ORAL at 21:31

## 2021-06-25 RX ADMIN — AMLODIPINE BESYLATE 2.5 MILLIGRAM(S): 2.5 TABLET ORAL at 21:31

## 2021-06-25 RX ADMIN — Medication 975 MILLIGRAM(S): at 07:53

## 2021-06-25 RX ADMIN — PANTOPRAZOLE SODIUM 40 MILLIGRAM(S): 20 TABLET, DELAYED RELEASE ORAL at 05:24

## 2021-06-25 NOTE — PROGRESS NOTE ADULT - ATTENDING COMMENTS
Patient seen, examined and discussed with the resident. I have directed the care. He has suffered a pontine CVA with ataxia. He is improving. He is walking with PT with a rollator walker and a cane. He is working on stairs with a straight cane. Norvasc 2.5 mg is dosed at bedtime. I monitoring his vital signs carefully and they remain stable.  He continues to require acute rehab with 3 hours of daily therapies. I edited the note. Patient seen, examined and discussed with the resident. I have directed the care. He has suffered a pontine CVA with ataxia. He is improving. He is walking with PT with a straight cane. He is working on stairs with a straight cane. Norvasc 2.5 mg is dosed at bedtime. I monitoring his vital signs carefully and they remain stable.  He continues to require acute rehab with 3 hours of daily therapies. I edited the note.

## 2021-06-25 NOTE — PROGRESS NOTE ADULT - ASSESSMENT
Rehab of clinically diagnosed CVA, right cerebellar penumbra, not seen on MRI by radiology but seen by neurologist, with impaired balance, unsteady gait, Right-sided facial numbness, and dysphagia. NIHSS 3. mRS 4. Neuro./Dr. Dow reviewed the MRI he sees a right pontine CVA.     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 Structural Cardio f/u as outpatient    #Hepatitis C Ab positive  - Hepatitis B Surface Ab and Core Ab positive, Hepatitis B Surface Ag negative  - Hepatitis C RNA PCR negative  - patient states he was worked up in the past and received some treatment for this one year ago in [place]. He was told that he did well with the treatment he received.  Outpatient GI F/U     #Vertigo, improving  - His BP is good when standing with PT.  - Meclizine 12.5 mg PO daily PRN for dizziness added. He reports some subjective vertigo.    # Dysphagia  - ENT previously consulted, noted laryngeal Right-sided vocal cord paralysis  - s/p NGT  - MBSS performed, patient cleared for DASH/TLC regular consistency diet by SLP on 6/8/21    #Hyperlipidemia  - Atorvastatin 80 mg PO nightly    #Hypertension  - Amlodipine 2.5 mg PO qhs.    #Headache  - as per neurology-on indomethacin as needed and O2 as needed while in the hospital for cluster headache. Headache stable.      -Pain control: Tylenol PRN    -GI/Bowel Mgmt: Pantoprazole 40 mg PO daily before breakfast, on Indomethacin    -Bladder management: Patient continent of bladder     -Skin:  No active issues at this time    -FEN     - Diet: DASH/TLC, regular consistency solids with thin liquids     Precautions / PROPHYLAXIS:      - Falls    - Weight bearing status: WBAT      - DVT prophylaxis: Lovenox 40 mg SubQ daily

## 2021-06-25 NOTE — PROGRESS NOTE ADULT - SUBJECTIVE AND OBJECTIVE BOX
Patient is a 55y old  Male who presents with a chief complaint of CVA (09 Jun 2021 10:47)      HPI:  55 year-old male with a PMHx of hyperlipidemia and hypertension (non-compliant with meds) who presented to ED on 6/3/21 with complaint of headache, weakness, decreased sensation on the Right side of his face, and dizziness since 1 AM. Patient states he felt perfectly fine when he went to bed on evening of 6/2/21. NIHSS 3 on arrival to ED. Out of window for tPA. CT Head without contrast negative for acute intracranial hemorrhage or large territorial infarct, with 1 cm dural-based lesion of the Left parietal convexity, likely reflecting a meningioma. 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 Patient admitted to Neurology service for management of clinically diagnosed CVA. Patient started on Aspirin and high-intensity statin. +Patent foramen ovale on TTE. Patient with apparent Right-sided vocal cord paralysis, initially kept NPO with NG tube in place. SLP evaluated patient with Modified Barium Swallow, and patient cleared for regular diet with thin liquids the day of transfer to Rehab.    The patient was evaluated by PT, and required contact guard assist-minimal assist with bed mobility, minimal-moderate assist with transfers, and moderate assist with ambulation 25 ft x2 (once with RW and then holding onto wall railing). Prior to admission, patient was fully independent in ADLs and ambulation without an assistive device. The patient was evaluated by the Physiatry team on the Neurology service and was found to be a good candidate for acute inpatient rehab.    Prior level of function: Patient states he was independent with ADLs and ambulation without an assistive device.  Current level of function: Contact guard assist-minimal assist with bed mobility, minimal-moderate assist with transfers, and moderate assist with ambulation 25 ft x2 (once with RW and then holding onto wall railing). (08 Jun 2021 15:51)    Current level of function: Set up bed mobility, supervision with transfers, supervision to ambulate 150 ft x2 with SC, and close supervision to ascend 16 steps with 1 HR (24 June 2021).    TODAY'S SUBJECTIVE & REVIEW OF SYMPTOMS:  Patient seen and examined at bedside with Dr. Balbuena. No acute overnight events. Patient denies headache this morning. Denies any new complaints.     Review of Systems:   Constitutional:    [ x ] WNL           [   ] poor appetite   [   ] insomnia   [   ] tired   Cardio:                [ x ] WNL           [   ] CP   [   ] ETIENNE   [   ] palpitations               Resp:                   [ x ] WNL           [   ] SOB   [  ] cough   [   ] wheezing   GI:                        [ x ] WNL           [   ] constipation   [   ] diarrhea   [   ] abdominal pain   [   ] nausea   [   ] emesis                                :                      [ x ] WNL           [   ] WESTBROOK  [   ] dysuria   [   ] difficulty voiding             Endo:                   [ x ] WNL          [   ] polyuria   [   ] temperature intolerance                 Skin:                     [ x ] WNL          [   ] pain   [   ] wound   [   ] rash   MSK:                    [ x ] WNL          [   ] muscle pain   [   ] joint pain/ stiffness   [   ] muscle tenderness   [   ] swelling   Neuro:                 [  ] WNL          [ x ] Right-sided HA improving  [   ] change in vision   [   ] tremor   [ x ] ataxia on right side more than weakness   [   ]dysphagia              Cognitive:           [ x ] WNL           [   ]confusion      Psych:                  [ x ] WNL           [   ] hallucinations   [   ]agitation   [   ] delusion   [   ]depression      PHYSICAL EXAM    Vital Signs Last 24 Hrs  T(C): 36.3 (24 Jun 2021 21:14), Max: 36.3 (24 Jun 2021 21:14)  T(F): 97.4 (24 Jun 2021 21:14), Max: 97.4 (24 Jun 2021 21:14)  HR: 90 (24 Jun 2021 21:14) (90 - 90)  BP: 138/89 (24 Jun 2021 21:14) (138/89 - 138/89)  BP(mean): --  RR: 20 (24 Jun 2021 21:14) (20 - 20)  SpO2: --    General:[ x ] NAD, Resting Comfortable,   [   ] other:                                HEENT: [ x ] NC/AT, EOMI, PERRL , Normal Conjunctivae,   [ x  ] other: vertical nystagmus  Cardio: [ x ] RRR, no murmur,   [   ] other:                              Pulm: [ x ] No Respiratory Distress,  Lungs CTAB,   [   ] other:                       Abdomen: [ x ]ND/NT, Soft,   [   ] other:    : [ x ] NO WESTBROOK CATHETER, [   ] WESTBROOK CATHETER- no meatal tear, no discharge, [   ] other:                                            MSK: [ x ] No joint swelling, Full ROM,   [   ] other:                                         Ext: [ x ]No C/C/E, No calf tenderness,   [   ]other:    Skin: [ x ]intact,   [   ] other:                                                                   Neurological Examination:  Cognitive: [ x ] AAO x 3,   [    ]  other:                                                                      Attention:  [ x ] intact,   [    ]  other:                            Memory: [ x ] intact,    [    ]  other:     Mood/Affect: [ x ] wnl,    [    ]  other:                                                                             Communication: [ x ]Fluent, no dysarthria, following commands:  [    ] other:   CN II - XII:  [  ] intact,  [   x ] other: decreased sensation of right side of face, r sided facial droop; weakness of right eye closing                                                                                         Motor:   RIGHT UE: [   ] WNL,  [ x ] other: 4/5 shoulder abduction, elbow flexion/extension, and hand   LEFT    UE: [   ] WNL,  [ x ] other: 4/5 shoulder abduction, elbow flexion/extension, and hand   RIGHT LE: [   ] WNL,  [ x ] other: 4/5 hip flexion, knee flexion/extension, ankle dorsiflexion/plantarflexion  LEFT    LE: [   ] WNL,  [ x ] other: 4/5 hip flexion, knee flexion/extension, ankle dorsiflexion/plantarflexion    Tone: [ x ] wnl,   [    ]  other:  DTRs: [ x ]symmetric, [   ] other:  Coordination:   [ x ] intact, with bilateral finger-to-nose   [    ] other:                                                                           Sensory: [  ] Intact to light touch,   [ x ] other: intact to light touch for bilateral upper and lower extremities, but decreased sensation over Right ophthalmic, maxillary, and mandibular areas.    MEDICATIONS  (STANDING):  amLODIPine   Tablet 2.5 milliGRAM(s) Oral at bedtime  aspirin  chewable 81 milliGRAM(s) Oral daily  atorvastatin 80 milliGRAM(s) Oral at bedtime  enoxaparin Injectable 40 milliGRAM(s) SubCutaneous at bedtime  meclizine 12.5 milliGRAM(s) Oral every 8 hours  pantoprazole    Tablet 40 milliGRAM(s) Oral before breakfast    MEDICATIONS  (PRN):  acetaminophen   Tablet .. 975 milliGRAM(s) Oral every 6 hours PRN Temp greater or equal to 38C (100.4F), Mild Pain (1 - 3), Moderate Pain (4 - 6), Severe Pain (7 - 10)  aluminum hydroxide/magnesium hydroxide/simethicone Suspension 30 milliLiter(s) Oral every 4 hours PRN Dyspepsia  indomethacin 25 milliGRAM(s) Oral every 12 hours PRN cluster headaches      LABS:        06-23    143  |  104  |  20  ----------------------------<  110<H>  5.0   |  29  |  0.9    Ca    10.5<H>      23 Jun 2021 08:05    TPro  7.6  /  Alb  4.6  /  TBili  <0.2  /  DBili  x   /  AST  50<H>  /  ALT  92<H>  /  AlkPhos  113  06-23        POCT Blood Glucose.: 134 mg/dL (06-19-21 @ 21:49)

## 2021-06-26 ENCOUNTER — TRANSCRIPTION ENCOUNTER (OUTPATIENT)
Age: 56
End: 2021-06-26

## 2021-06-26 RX ORDER — ACETAMINOPHEN 500 MG
3 TABLET ORAL
Qty: 0 | Refills: 0 | DISCHARGE
Start: 2021-06-26

## 2021-06-26 RX ORDER — MECLIZINE HCL 12.5 MG
1 TABLET ORAL
Qty: 0 | Refills: 0 | DISCHARGE
Start: 2021-06-26

## 2021-06-26 RX ADMIN — Medication 12.5 MILLIGRAM(S): at 21:33

## 2021-06-26 RX ADMIN — ENOXAPARIN SODIUM 40 MILLIGRAM(S): 100 INJECTION SUBCUTANEOUS at 21:34

## 2021-06-26 RX ADMIN — Medication 81 MILLIGRAM(S): at 12:05

## 2021-06-26 RX ADMIN — Medication 12.5 MILLIGRAM(S): at 13:15

## 2021-06-26 RX ADMIN — Medication 12.5 MILLIGRAM(S): at 05:44

## 2021-06-26 RX ADMIN — Medication 975 MILLIGRAM(S): at 05:44

## 2021-06-26 RX ADMIN — PANTOPRAZOLE SODIUM 40 MILLIGRAM(S): 20 TABLET, DELAYED RELEASE ORAL at 05:44

## 2021-06-26 RX ADMIN — ATORVASTATIN CALCIUM 80 MILLIGRAM(S): 80 TABLET, FILM COATED ORAL at 21:33

## 2021-06-26 NOTE — DISCHARGE NOTE PROVIDER - CARE PROVIDER_API CALL
Deandre Dow)  Neuromuscular Medicine  [address] [facility], [address]  [place], [place] [id number]  Phone: [phone]  Fax: [phone]  Follow Up Time: 2 weeks    Dru Sandhu)  Cardiovascular Disease; Internal Medicine  [address] [place] [address], [address]  [place] [address]  Phone: [phone]  Fax: [phone]  Follow Up Time: 2 weeks    Chris Sierra)  Surgical Physicians  [address] [place] [address] [address] [place] [address]  Phone: [phone]  Fax: [phone]  Follow Up Time: 1 month    Phillip Monahan (DO)  Medicine  Physicians  [address] [place] [address], [address] [place] [address]  Phone: [phone]  Fax: [phone]  Established Patient  Follow Up Time: 2 weeks    Hepatology (Liver) Clinic,   Dr. Stout  [address] [place] [address]  Phone: [phone]  Fax: (   )    -  Follow Up Time: Routine

## 2021-06-26 NOTE — PROGRESS NOTE ADULT - SUBJECTIVE AND OBJECTIVE BOX
T(C): 37.1 (06-26-21 @ 05:45), Max: 37.1 (06-25-21 @ 22:19)  HR: 86 (06-26-21 @ 05:45) (86 - 100)  BP: 112/56 (06-26-21 @ 05:45) (112/56 - 133/91)  RR: 18 (06-26-21 @ 05:45) (18 - 18)  SpO2: 97% (06-25-21 @ 22:19) (97% - 97%)      Patient was stable overnight and expresses fno new complaints     PE: aox3  doing  very  well  no  c/o     Alert   LUNGS- clear  COR- RRR  ABD- SOFT, NT  EXTR- w/o edema  NEURO- stabl    Assess:  Rehab of CVA / GD      Continue acute rehab program. and  dc  is  pending  soon

## 2021-06-26 NOTE — DISCHARGE NOTE PROVIDER - NSDCCPCAREPLAN_GEN_ALL_CORE_FT
PRINCIPAL DISCHARGE DIAGNOSIS  Diagnosis: Cerebrovascular accident (CVA)  Assessment and Plan of Treatment: You were admitted to the hospital with a stroke and you are being treated with medication and diet. It is very important that you continue as directed and also please follow up regularly with the Neurologists who cared for you during your hospital stay, as well as with your Primary Care Provider (PCP). Therapy will also continue at home.      SECONDARY DISCHARGE DIAGNOSES  Diagnosis: Hypertension  Assessment and Plan of Treatment: Your blood pressure is controlled with a low salt heart healthy diet and you are taking amlodipine (Norvasc) at bedtime. Please continue as prescribed and follow up regularly with your Primary Care Provider (PCP) and with your Cardiologist. You also have an opening in your heart called a PFO (patent foramen ovale), which may have contributed to your stroke, and please follow up with your Cardiologist, Dr. Fer Sandhu, for further evaluation and treatment.    Diagnosis: History of hepatitis C  Assessment and Plan of Treatment: You have a history of treated hepatitis C (no viral load was found in your blood, you have antibodies); also history of hepatitis B. Your liver tests are stable. You should continue to follow up at the Hepatology (Liver) clinic with Dr. Stout and avoid medications that are not healthy for your liver, also avoid drinking alcohol.    Diagnosis: Paralysis of right vocal cord  Assessment and Plan of Treatment: Please continue speech therapy and follow up with the ENT doctor who saw you in the hospital, Dr. Rivas.    Diagnosis: PFO (patent foramen ovale)  Assessment and Plan of Treatment: An opening in your heart called a PFO was found during the workup to find out the cause of your stroke. Please follow up with your Cardiologist, who will evaluate if you need further work-up and treatment.     PRINCIPAL DISCHARGE DIAGNOSIS  Diagnosis: Cerebrovascular accident (CVA)  Assessment and Plan of Treatment: You were admitted to the hospital with a stroke and you are being treated with medication and diet. It is very important that you continue as directed and also please follow up regularly with the Neurologists who cared for you during your hospital stay, as well as with your Primary Care Provider (PCP). Therapy will continue at the short-term Rehab facility, and can also continue when you are discharged home.      SECONDARY DISCHARGE DIAGNOSES  Diagnosis: Hypertension  Assessment and Plan of Treatment: Your blood pressure is controlled with a low salt heart healthy diet and you are taking amlodipine (Norvasc) at bedtime. Please continue as prescribed and follow up regularly with your Primary Care Provider (PCP) and with your Cardiologist. You also have an opening in your heart called a PFO (patent foramen ovale), which may have contributed to your stroke, and please follow up with your Cardiologist, Dr. Fer Sandhu, for further evaluation and treatment.    Diagnosis: History of hepatitis C  Assessment and Plan of Treatment: You have a history of treated hepatitis C (no viral load was found in your blood, you have antibodies); also history of hepatitis B. Your liver tests are stable. You should continue to follow up at the Hepatology (Liver) clinic with Dr. Stout and avoid medications that are not healthy for your liver, also avoid drinking alcohol.    Diagnosis: Paralysis of right vocal cord  Assessment and Plan of Treatment: Please continue speech therapy and follow up with the ENT doctor who saw you in the hospital, Dr. Rivas.    Diagnosis: PFO (patent foramen ovale)  Assessment and Plan of Treatment: An opening in your heart called a PFO was found during the workup to find out the cause of your stroke. Please follow up with your Cardiologist, who will evaluate if you need further work-up and treatment.     PRINCIPAL DISCHARGE DIAGNOSIS  Diagnosis: Cerebrovascular accident (CVA)  Assessment and Plan of Treatment: You were admitted to the hospital with a stroke and you are being treated with medication and diet. It is very important that you continue as directed and also please follow up regularly with the Neurologists who cared for you during your hospital stay, as well as with your Primary Care Provider (PCP). Therapy will continue at the short-term Rehab facility, and can also continue when you are discharged home.  ****PLEASE CALL [phone] OPTION 4 AND THEN OPTION 2, TO SPEAK TO DR. RAYMUNDO ANDERSEN'S , TO SET UP APPOINTMENT AT THE STROKE CLINIC IN 2 TO 4 WEEKS***      SECONDARY DISCHARGE DIAGNOSES  Diagnosis: Hypertension  Assessment and Plan of Treatment: Your blood pressure is controlled with a low salt heart healthy diet and you are taking amlodipine (Norvasc) at bedtime. Please continue as prescribed and follow up regularly with your Primary Care Provider (PCP) and with your Cardiologist. You also have an opening in your heart called a PFO (patent foramen ovale), which may have contributed to your stroke, and please follow up with your Cardiologist, Dr. Fer Sandhu, for further evaluation and treatment.    Diagnosis: History of hepatitis C  Assessment and Plan of Treatment: You have a history of treated hepatitis C (no viral load was found in your blood, you have antibodies); also history of hepatitis B. Your liver tests are stable. You should continue to follow up at the Hepatology (Liver) clinic with Dr. Stout and avoid medications that are not healthy for your liver, also avoid drinking alcohol.    Diagnosis: Paralysis of right vocal cord  Assessment and Plan of Treatment: Please continue speech therapy and follow up with the ENT doctor who saw you in the hospital, Dr. Rivas.    Diagnosis: PFO (patent foramen ovale)  Assessment and Plan of Treatment: An opening in your heart called a PFO was found during the workup to find out the cause of your stroke. Please follow up with your Cardiologist, who will evaluate if you need further work-up and treatment.

## 2021-06-26 NOTE — DISCHARGE NOTE PROVIDER - NSDCMRMEDTOKEN_GEN_ALL_CORE_FT
acetaminophen 325 mg oral tablet: 3 tab(s) orally every 6 hours, As needed, Temp greater or equal to 38C (100.4F), or pain  amLODIPine 2.5 mg oral tablet: 1 tab(s) orally once a day  aspirin 81 mg oral tablet, chewable: 1 tab(s) orally once a day  atorvastatin 80 mg oral tablet: 1 tab(s) orally once a day (at bedtime)  meclizine 12.5 mg oral tablet: 1 tab(s) orally every 8 hours   acetaminophen 325 mg oral tablet: 3 tab(s) orally every 6 hours, As needed, Temp greater or equal to 38C (100.4F), or pain  amLODIPine 2.5 mg oral tablet: 1 tab(s) orally once a day (at bedtime)  aspirin 81 mg oral tablet, chewable: 1 tab(s) orally once a day  atorvastatin 80 mg oral tablet: 1 tab(s) orally once a day (at bedtime)  indomethacin 25 mg oral capsule: 1 cap(s) orally every 12 hours, As needed, cluster headaches  meclizine 12.5 mg oral tablet: 1 tab(s) orally every 8 hours  pantoprazole 40 mg oral delayed release tablet: 1 tab(s) orally once a day (before a meal); take 30 minutes before breakfast

## 2021-06-26 NOTE — DISCHARGE NOTE PROVIDER - PROVIDER TOKENS
PROVIDER:[TOKEN:[[id number]],FOLLOWUP:[2 weeks]],PROVIDER:[TOKEN:[[id number]],FOLLOWUP:[2 weeks]],PROVIDER:[TOKEN:[[id number]],FOLLOWUP:[1 month]],PROVIDER:[TOKEN:[[id number]],FOLLOWUP:[2 weeks],ESTABLISHEDPATIENT:[T]],FREE:[LAST:[Hepatology (Liver) Clinic],PHONE:[phone]],FAX:[(   )    -],ADDRESS:[Dr. Stout  [address] [place] [address]],FOLLOWUP:[Routine]]

## 2021-06-26 NOTE — DISCHARGE NOTE PROVIDER - HOSPITAL COURSE
Pt is a 55 y.o. male with PMH of HLD on statin and HTN ( non compliant with Anti HTN meds) who presented to the ED on 6/3/21 for stroke like symptoms that  the patient stated he experienced since 1 am on day of admission-  HA, weakness, dizziness and R sided change in sensation of face and RUE. Upon EMS arrival, he had difficulty ambulating. Denied taking any medications PTA. Pt admitted to HA intermittently for past week PTA, alleviated by NSAIDS. NIHSS 3 on arrival to ED. Out of window for tPA. CT Head without contrast negative for acute intracranial hemorrhage or large territorial infarct, with 1 cm dural-based lesion of the Left parietal convexity, likely reflecting a meningioma. 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 Patient admitted to Neurology service for management of clinically diagnosed CVA. Patient started on Aspirin and high-intensity statin. +Patent foramen ovale on TTE. The patient was seen by ENT for apparent Right-sided vocal cord paralysis; was initially kept NPO with NG tube in place. SLP evaluated patient with Modified Barium Swallow, and patient was cleared for regular diet with thin liquids the day of transfer to Rehab, 6/8/21.    During his stay on Rehab medicine service, the patient participated in 3 hours of interdisciplinary therapy at least 5 days a week.  Prior to admission the patient stated he was independent in ambulation and with ADL's.  Current level of function: Set up bed mobility, supervision with transfers, supervision to ambulate 150 ft x2 with SC, and close supervision to ascend 16 steps with 1 HR    He was seen by Neurology for cluster headaches, which were treated and relieved with indomethacin prn and O2 4LPM via face mask.  Meclizine was added for vertigo, 12.5mg po q8h, with much improvement of symptoms.    Assessment and Plan:   · Assessment    Rehab of clinically diagnosed CVA, right cerebellar penumbra, not seen on MRI by radiology but seen by neurologist, with impaired balance, unsteady gait, Right-sided facial numbness, and dysphagia. NIHSS 3. mRS 4. Neuro./Dr. Dow reviewed the MRI he sees a right pontine CVA.     - Patient completed 3 hours/day of interdisciplinary acute inpatient rehabilitation  - CTA Head & Neck showed occlusion of the proximal V4 segment of the Right vertebral artery  - Aspirin 81 mg PO daily  - Atorvastatin 80 mg PO nightly    #Large patent foramen ovale seen on transthoracic echocardiogram. Structural Cardio f/u as outpatient    #Hepatitis C Ab positive  - Hepatitis B Surface Ab and Core Ab positive, Hepatitis B Surface Ag negative  - Hepatitis C RNA PCR negative  - patient states he was worked up in the past and received some treatment for this one year ago in [place]. He was told that he did well with the treatment he received.  Outpatient GI F/U     #Vertigo, improving  - His BP is good when standing with PT.  - Meclizine 12.5 mg PO q8h for dizziness added with relief of symptoms    # Dysphagia  - ENT previously consulted, noted laryngeal Right-sided vocal cord paralysis  - s/p NGT  - MBSS performed, patient cleared for DASH/TLC regular consistency diet by SLP on 6/8/21  - ENT follow up out-patient    #Hyperlipidemia  - Atorvastatin 80 mg PO nightly    #Hypertension  - Amlodipine 2.5 mg PO qhs.    #Headache  - as per neurology-on indomethacin as needed and O2 as needed while in the hospital for cluster headache. Headache stable.      -Pain control: Tylenol PRN    -GI/Bowel Mgmt: Pantoprazole 40 mg PO daily before breakfast,  while on Indomethacin     Pt is a 55 y.o. male with PMH of HLD on statin and HTN ( non compliant with Anti HTN meds) who presented to the ED on 6/3/21 for stroke like symptoms that  the patient stated he experienced since 1 am on day of admission-  HA, weakness, dizziness and R sided change in sensation of face and RUE. Upon EMS arrival, he had difficulty ambulating. Denied taking any medications PTA. Pt admitted to HA intermittently for past week PTA, alleviated by NSAIDS. NIHSS 3 on arrival to ED. Out of window for tPA. CT Head without contrast negative for acute intracranial hemorrhage or large territorial infarct, with 1 cm dural-based lesion of the Left parietal convexity, likely reflecting a meningioma. 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 Patient admitted to Neurology service for management of clinically diagnosed CVA. Patient started on Aspirin and high-intensity statin. +Patent foramen ovale on TTE. The patient was seen by ENT for apparent Right-sided vocal cord paralysis; was initially kept NPO with NG tube in place. SLP evaluated patient with Modified Barium Swallow, and patient was cleared for regular diet with thin liquids the day of transfer to Rehab, 6/8/21.    During his stay on Rehab medicine service, the patient participated in 3 hours of interdisciplinary therapy at least 5 days a week.  Prior to admission the patient stated he was independent in ambulation and with ADL's.  Current level of function: Set up bed mobility, supervision with transfers, supervision to ambulate 150 ft x2 with SC, and close supervision to ascend 16 steps with 1 HR    He was seen by Neurology for cluster headaches, which were treated and relieved with indomethacin prn and O2 4LPM via face mask.  Meclizine was added for vertigo, 12.5mg po q8h, with much improvement of symptoms.    Assessment and Plan:   · Assessment    Rehab of clinically diagnosed CVA, right cerebellar penumbra, not seen on MRI by radiology but seen by neurologist, with impaired balance, unsteady gait, Right-sided facial numbness, and dysphagia. NIHSS 3. mRS 4. Neuro./Dr. Dow reviewed the MRI he sees a right pontine CVA.     - Patient completed 3 hours/day of interdisciplinary acute inpatient rehabilitation  - CTA Head & Neck showed occlusion of the proximal V4 segment of the Right vertebral artery  - Aspirin 81 mg PO daily  - Atorvastatin 80 mg PO nightly    #Large patent foramen ovale seen on transthoracic echocardiogram. Structural Cardio f/u as outpatient    #Hepatitis C Ab positive  - Hepatitis B Surface Ab and Core Ab positive, Hepatitis B Surface Ag negative  - Hepatitis C RNA PCR negative  - patient states he was worked up in the past and received some treatment for this one year ago in [place]. He was told that he did well with the treatment he received.  Outpatient GI F/U     #Vertigo, improving  - His BP is good when standing with PT.  - Meclizine 12.5 mg PO q8h for dizziness added with relief of symptoms    # Dysphagia  - ENT previously consulted, noted laryngeal Right-sided vocal cord paralysis  - s/p NGT  - MBSS performed, patient cleared for DASH/TLC regular consistency diet by SLP on 6/8/21  - ENT follow up out-patient    #Hyperlipidemia  - Atorvastatin 80 mg PO nightly    #Hypertension  - Amlodipine 2.5 mg PO qhs.    #Headache  - as per neurology-on indomethacin as needed and O2 as needed while in the hospital for cluster headache. Headache stable.      -Pain control: Tylenol PRN    -GI/Bowel Mgmt: Pantoprazole 40 mg PO daily before breakfast,  while on Indomethacin    The patient was discharged to [facility] facility ([facility]) on 6/29/21 in order to continue PT/OT/Speech therapy.  Covid swab 6/29/21 was negative.  He will need to follow up with Neurology (stroke clinic), Hepatology clinic (for hx Hep B and C) and with Dr. Fer Sandhu for the PFO.

## 2021-06-26 NOTE — DISCHARGE NOTE PROVIDER - CARE PROVIDERS DIRECT ADDRESSES
[email or web address]

## 2021-06-26 NOTE — DISCHARGE NOTE PROVIDER - NSDCFUADDINST_GEN_ALL_CORE_FT
***PLEASE SHOW THESE DISCHARGE PAPERS TO ALL YOUR DOCTORS AND CAREGIVERS*** ***PLEASE SHOW THESE DISCHARGE PAPERS TO ALL YOUR DOCTORS AND CAREGIVERS***    ****PLEASE HAVE EGER CALL [phone] OPTION 4 AND THEN OPTION 2, TO SPEAK TO DR. RAYMUNDO [facility]ERSEN'S , IN ORDER TO SET UP AN APPOINTMENT WITH THE [facility] IN 2 TO 4 WEEKS****

## 2021-06-27 RX ADMIN — AMLODIPINE BESYLATE 2.5 MILLIGRAM(S): 2.5 TABLET ORAL at 21:39

## 2021-06-27 RX ADMIN — PANTOPRAZOLE SODIUM 40 MILLIGRAM(S): 20 TABLET, DELAYED RELEASE ORAL at 05:46

## 2021-06-27 RX ADMIN — ATORVASTATIN CALCIUM 80 MILLIGRAM(S): 80 TABLET, FILM COATED ORAL at 21:39

## 2021-06-27 RX ADMIN — Medication 12.5 MILLIGRAM(S): at 21:39

## 2021-06-27 RX ADMIN — Medication 81 MILLIGRAM(S): at 11:44

## 2021-06-27 RX ADMIN — Medication 975 MILLIGRAM(S): at 16:53

## 2021-06-27 RX ADMIN — Medication 12.5 MILLIGRAM(S): at 05:45

## 2021-06-27 RX ADMIN — Medication 12.5 MILLIGRAM(S): at 13:03

## 2021-06-27 RX ADMIN — ENOXAPARIN SODIUM 40 MILLIGRAM(S): 100 INJECTION SUBCUTANEOUS at 21:39

## 2021-06-27 NOTE — PROGRESS NOTE ADULT - ATTENDING COMMENTS
Patient seen, examined and discussed with the resident. I have directed the care. He has suffered a pontine CVA with ataxia. He is improving. He is walking with PT with a straight cane. He is working on stairs with a straight cane. Norvasc 2.5 mg is dosed at bedtime. I monitoring his vital signs carefully and they remain stable.  He continues to require acute rehab with 3 hours of daily therapies. I edited the note. Patient seen, examined and discussed with the resident. I have directed the care. He has suffered a pontine CVA with ataxia. He is improving. He is walking with PT with a straight cane. He is working on stairs with a straight cane. Norvasc 2.5 mg is dosed at bedtime. I monitoring his vital signs carefully and they remain stable.  He continues to require acute rehab with 3 hours of daily therapies.   Working on independent stair climbing for safe discharge home.

## 2021-06-27 NOTE — PROGRESS NOTE ADULT - SUBJECTIVE AND OBJECTIVE BOX
Patient is a 55y old  Male who presents with a chief complaint of CVA (09 Jun 2021 10:47)      HPI:  55 year-old male with a PMHx of hyperlipidemia and hypertension (non-compliant with meds) who presented to ED on 6/3/21 with complaint of headache, weakness, decreased sensation on the Right side of his face, and dizziness since 1 AM. Patient states he felt perfectly fine when he went to bed on evening of 6/2/21. NIHSS 3 on arrival to ED. Out of window for tPA. CT Head without contrast negative for acute intracranial hemorrhage or large territorial infarct, with 1 cm dural-based lesion of the Left parietal convexity, likely reflecting a meningioma. 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 Patient admitted to Neurology service for management of clinically diagnosed CVA. Patient started on Aspirin and high-intensity statin. +Patent foramen ovale on TTE. Patient with apparent Right-sided vocal cord paralysis, initially kept NPO with NG tube in place. SLP evaluated patient with Modified Barium Swallow, and patient cleared for regular diet with thin liquids the day of transfer to Rehab.    The patient was evaluated by PT, and required contact guard assist-minimal assist with bed mobility, minimal-moderate assist with transfers, and moderate assist with ambulation 25 ft x2 (once with RW and then holding onto wall railing). Prior to admission, patient was fully independent in ADLs and ambulation without an assistive device. The patient was evaluated by the Physiatry team on the Neurology service and was found to be a good candidate for acute inpatient rehab.    Prior level of function: Patient states he was independent with ADLs and ambulation without an assistive device.  Current level of function: Contact guard assist-minimal assist with bed mobility, minimal-moderate assist with transfers, and moderate assist with ambulation 25 ft x2 (once with RW and then holding onto wall railing). (08 Jun 2021 15:51)    Current level of function: Set up bed mobility, supervision with transfers, supervision to ambulate 150 ft x2 with SC, and close supervision to ascend 16 steps with 1 HR (24 June 2021).    TODAY'S SUBJECTIVE & REVIEW OF SYMPTOMS:  No acute events overnight. VSS. Patient doing well in rehab, states working on stairs.      Review of Systems:   Constitutional:    [ x ] WNL           [   ] poor appetite   [   ] insomnia   [   ] tired   Cardio:                [ x ] WNL           [   ] CP   [   ] ETIENNE   [   ] palpitations               Resp:                   [ x ] WNL           [   ] SOB   [  ] cough   [   ] wheezing   GI:                        [ x ] WNL           [   ] constipation   [   ] diarrhea   [   ] abdominal pain   [   ] nausea   [   ] emesis                                :                      [ x ] WNL           [   ] WESTBROOK  [   ] dysuria   [   ] difficulty voiding             Endo:                   [ x ] WNL          [   ] polyuria   [   ] temperature intolerance                 Skin:                     [ x ] WNL          [   ] pain   [   ] wound   [   ] rash   MSK:                    [ x ] WNL          [   ] muscle pain   [   ] joint pain/ stiffness   [   ] muscle tenderness   [   ] swelling   Neuro:                 [  ] WNL          [ x ] Right-sided HA improving  [   ] change in vision   [   ] tremor   [ x ] ataxia on right side more than weakness   [   ]dysphagia              Cognitive:           [ x ] WNL           [   ]confusion      Psych:                  [ x ] WNL           [   ] hallucinations   [   ]agitation   [   ] delusion   [   ]depression      PHYSICAL EXAM    Vital Signs Last 24 Hrs  T(C): 36.2 (27 Jun 2021 05:31), Max: 37 (26 Jun 2021 21:36)  T(F): 97.2 (27 Jun 2021 05:31), Max: 98.6 (26 Jun 2021 21:36)  HR: 89 (27 Jun 2021 05:31) (84 - 91)  BP: 114/64 (27 Jun 2021 05:31) (107/58 - 142/72)  BP(mean): --  RR: 18 (27 Jun 2021 05:31) (18 - 18)  SpO2: --  General:[ x ] NAD, Resting Comfortable,   [   ] other:                                HEENT: [ x ] NC/AT, EOMI, PERRL , Normal Conjunctivae,   [ x  ] other: vertical nystagmus  Cardio: [ x ] RRR, no murmur,   [   ] other:                              Pulm: [ x ] No Respiratory Distress,  Lungs CTAB,   [   ] other:                       Abdomen: [ x ]ND/NT, Soft,   [   ] other:    : [ x ] NO WESTBROOK CATHETER, [   ] WESTBROOK CATHETER- no meatal tear, no discharge, [   ] other:                                            MSK: [ x ] No joint swelling, Full ROM,   [   ] other:                                         Ext: [ x ]No C/C/E, No calf tenderness,   [   ]other:    Skin: [ x ]intact,   [   ] other:                                                                   Neurological Examination:  Cognitive: [ x ] AAO x 3,   [    ]  other:                                                                      Attention:  [ x ] intact,   [    ]  other:                            Memory: [ x ] intact,    [    ]  other:     Mood/Affect: [ x ] wnl,    [    ]  other:                                                                             Communication: [ x ]Fluent, no dysarthria, following commands:  [    ] other:   CN II - XII:  [  ] intact,  [   x ] other: decreased sensation of right side of face, r sided facial droop; weakness of right eye closing                                                                                         Motor:   RIGHT UE: [   ] WNL,  [ x ] other: 4/5 shoulder abduction, elbow flexion/extension, and hand   LEFT    UE: [   ] WNL,  [ x ] other: 4/5 shoulder abduction, elbow flexion/extension, and hand   RIGHT LE: [   ] WNL,  [ x ] other: 4/5 hip flexion, knee flexion/extension, ankle dorsiflexion/plantarflexion  LEFT    LE: [   ] WNL,  [ x ] other: 4/5 hip flexion, knee flexion/extension, ankle dorsiflexion/plantarflexion    Tone: [ x ] wnl,   [    ]  other:  DTRs: [ x ]symmetric, [   ] other:  Coordination:   [ x ] intact, with bilateral finger-to-nose   [    ] other:                                                                           Sensory: [  ] Intact to light touch,   [ x ] other: intact to light touch for bilateral upper and lower extremities, but decreased sensation over Right ophthalmic, maxillary, and mandibular areas.    MEDICATIONS  (STANDING):  amLODIPine   Tablet 2.5 milliGRAM(s) Oral at bedtime  aspirin  chewable 81 milliGRAM(s) Oral daily  atorvastatin 80 milliGRAM(s) Oral at bedtime  enoxaparin Injectable 40 milliGRAM(s) SubCutaneous at bedtime  meclizine 12.5 milliGRAM(s) Oral every 8 hours  pantoprazole    Tablet 40 milliGRAM(s) Oral before breakfast    MEDICATIONS  (PRN):  acetaminophen   Tablet .. 975 milliGRAM(s) Oral every 6 hours PRN Temp greater or equal to 38C (100.4F), Mild Pain (1 - 3), Moderate Pain (4 - 6), Severe Pain (7 - 10)  aluminum hydroxide/magnesium hydroxide/simethicone Suspension 30 milliLiter(s) Oral every 4 hours PRN Dyspepsia  indomethacin 25 milliGRAM(s) Oral every 12 hours PRN cluster headaches      LABS:        06-23    143  |  104  |  20  ----------------------------<  110<H>  5.0   |  29  |  0.9    Ca    10.5<H>      23 Jun 2021 08:05    TPro  7.6  /  Alb  4.6  /  TBili  <0.2  /  DBili  x   /  AST  50<H>  /  ALT  92<H>  /  AlkPhos  113  06-23        POCT Blood Glucose.: 134 mg/dL (06-19-21 @ 21:49)

## 2021-06-27 NOTE — PROGRESS NOTE ADULT - ASSESSMENT
Rehab of clinically diagnosed CVA, right cerebellar penumbra, not seen on MRI by radiology but seen by neurologist, with impaired balance, unsteady gait, Right-sided facial numbness, and dysphagia. NIHSS 3. mRS 4. Neuro./Dr. Dow reviewed the MRI he sees a right pontine CVA.     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 Structural Cardio f/u as outpatient    #Hepatitis C Ab positive  - Hepatitis B Surface Ab and Core Ab positive, Hepatitis B Surface Ag negative  - Hepatitis C RNA PCR negative  - patient states he was worked up in the past and received some treatment for this one year ago in [place]. He was told that he did well with the treatment he received.  Outpatient GI F/U     #Vertigo, improving  - His BP is good when standing with PT.  - Meclizine 12.5 mg PO daily PRN for dizziness added. He reports some subjective vertigo.    # Dysphagia  - ENT previously consulted, noted laryngeal Right-sided vocal cord paralysis  - s/p NGT  - MBSS performed, patient cleared for DASH/TLC regular consistency diet by SLP on 6/8/21    #Hyperlipidemia  - Atorvastatin 80 mg PO nightly    #Hypertension  - Amlodipine 2.5 mg PO qhs.    #Headache  - as per neurology-on indomethacin as needed and O2 as needed while in the hospital for cluster headache. Headache stable.      -Pain control: Tylenol PRN    -GI/Bowel Mgmt: Pantoprazole 40 mg PO daily before breakfast, on Indomethacin    -Bladder management: Patient continent of bladder     -Skin:  No active issues at this time    -FEN     - Diet: DASH/TLC, regular consistency solids with thin liquids     Precautions / PROPHYLAXIS:      - Falls    - Weight bearing status: WBAT      - DVT prophylaxis: Lovenox 40 mg SubQ daily

## 2021-06-28 LAB
ALBUMIN SERPL ELPH-MCNC: 4.6 G/DL — SIGNIFICANT CHANGE UP (ref 3.5–5.2)
ALP SERPL-CCNC: 100 U/L — SIGNIFICANT CHANGE UP (ref 30–115)
ALT FLD-CCNC: 55 U/L — HIGH (ref 0–41)
ANION GAP SERPL CALC-SCNC: 9 MMOL/L — SIGNIFICANT CHANGE UP (ref 7–14)
AST SERPL-CCNC: 37 U/L — SIGNIFICANT CHANGE UP (ref 0–41)
BASOPHILS # BLD AUTO: 0.07 K/UL — SIGNIFICANT CHANGE UP (ref 0–0.2)
BASOPHILS NFR BLD AUTO: 1.2 % — HIGH (ref 0–1)
BILIRUB SERPL-MCNC: <0.2 MG/DL — SIGNIFICANT CHANGE UP (ref 0.2–1.2)
BUN SERPL-MCNC: 16 MG/DL — SIGNIFICANT CHANGE UP (ref 10–20)
CALCIUM SERPL-MCNC: 10.2 MG/DL — HIGH (ref 8.5–10.1)
CHLORIDE SERPL-SCNC: 103 MMOL/L — SIGNIFICANT CHANGE UP (ref 98–110)
CO2 SERPL-SCNC: 29 MMOL/L — SIGNIFICANT CHANGE UP (ref 17–32)
CREAT SERPL-MCNC: 0.9 MG/DL — SIGNIFICANT CHANGE UP (ref 0.7–1.5)
EOSINOPHIL # BLD AUTO: 0.1 K/UL — SIGNIFICANT CHANGE UP (ref 0–0.7)
EOSINOPHIL NFR BLD AUTO: 1.7 % — SIGNIFICANT CHANGE UP (ref 0–8)
GLUCOSE SERPL-MCNC: 246 MG/DL — HIGH (ref 70–99)
HCT VFR BLD CALC: 36.4 % — LOW (ref 42–52)
HGB BLD-MCNC: 11.8 G/DL — LOW (ref 14–18)
IMM GRANULOCYTES NFR BLD AUTO: 1.2 % — HIGH (ref 0.1–0.3)
LYMPHOCYTES # BLD AUTO: 2.28 K/UL — SIGNIFICANT CHANGE UP (ref 1.2–3.4)
LYMPHOCYTES # BLD AUTO: 38.1 % — SIGNIFICANT CHANGE UP (ref 20.5–51.1)
MAGNESIUM SERPL-MCNC: 2 MG/DL — SIGNIFICANT CHANGE UP (ref 1.8–2.4)
MCHC RBC-ENTMCNC: 28.8 PG — SIGNIFICANT CHANGE UP (ref 27–31)
MCHC RBC-ENTMCNC: 32.4 G/DL — SIGNIFICANT CHANGE UP (ref 32–37)
MCV RBC AUTO: 88.8 FL — SIGNIFICANT CHANGE UP (ref 80–94)
MONOCYTES # BLD AUTO: 0.65 K/UL — HIGH (ref 0.1–0.6)
MONOCYTES NFR BLD AUTO: 10.9 % — HIGH (ref 1.7–9.3)
NEUTROPHILS # BLD AUTO: 2.82 K/UL — SIGNIFICANT CHANGE UP (ref 1.4–6.5)
NEUTROPHILS NFR BLD AUTO: 46.9 % — SIGNIFICANT CHANGE UP (ref 42.2–75.2)
NRBC # BLD: 0 /100 WBCS — SIGNIFICANT CHANGE UP (ref 0–0)
PLATELET # BLD AUTO: 294 K/UL — SIGNIFICANT CHANGE UP (ref 130–400)
POTASSIUM SERPL-MCNC: 5.2 MMOL/L — HIGH (ref 3.5–5)
POTASSIUM SERPL-SCNC: 5.2 MMOL/L — HIGH (ref 3.5–5)
PROT SERPL-MCNC: 7.5 G/DL — SIGNIFICANT CHANGE UP (ref 6–8)
RBC # BLD: 4.1 M/UL — LOW (ref 4.7–6.1)
RBC # FLD: 14.6 % — HIGH (ref 11.5–14.5)
SODIUM SERPL-SCNC: 141 MMOL/L — SIGNIFICANT CHANGE UP (ref 135–146)
WBC # BLD: 5.99 K/UL — SIGNIFICANT CHANGE UP (ref 4.8–10.8)
WBC # FLD AUTO: 5.99 K/UL — SIGNIFICANT CHANGE UP (ref 4.8–10.8)

## 2021-06-28 RX ORDER — INDOMETHACIN 50 MG
1 CAPSULE ORAL
Qty: 0 | Refills: 0 | DISCHARGE
Start: 2021-06-28

## 2021-06-28 RX ADMIN — PANTOPRAZOLE SODIUM 40 MILLIGRAM(S): 20 TABLET, DELAYED RELEASE ORAL at 06:22

## 2021-06-28 RX ADMIN — Medication 975 MILLIGRAM(S): at 06:22

## 2021-06-28 RX ADMIN — Medication 81 MILLIGRAM(S): at 15:34

## 2021-06-28 RX ADMIN — AMLODIPINE BESYLATE 2.5 MILLIGRAM(S): 2.5 TABLET ORAL at 22:13

## 2021-06-28 RX ADMIN — Medication 25 MILLIGRAM(S): at 17:28

## 2021-06-28 RX ADMIN — Medication 12.5 MILLIGRAM(S): at 22:14

## 2021-06-28 RX ADMIN — Medication 12.5 MILLIGRAM(S): at 06:22

## 2021-06-28 RX ADMIN — Medication 25 MILLIGRAM(S): at 12:13

## 2021-06-28 RX ADMIN — ENOXAPARIN SODIUM 40 MILLIGRAM(S): 100 INJECTION SUBCUTANEOUS at 22:14

## 2021-06-28 RX ADMIN — Medication 12.5 MILLIGRAM(S): at 15:35

## 2021-06-28 RX ADMIN — ATORVASTATIN CALCIUM 80 MILLIGRAM(S): 80 TABLET, FILM COATED ORAL at 22:13

## 2021-06-28 NOTE — PROGRESS NOTE ADULT - ATTENDING COMMENTS
Patient seen and examined with the resident. We discussed the case. I have directed the care. He has suffered a pontine CVA. He has been showing improvement. He has right sided hemiataxia. He has dysphagia. A tentative discharge is planned for tomorrow. He requires acute inpatient rehab with 3 hours of daily therapies. He can benefit from home PT, OT and Speech Therapies. Patient seen and examined with the resident. We discussed the case. I have directed the care. He has suffered a pontine CVA. He has been showing improvement. He has right sided hemiataxia. He has dysphagia. A tentative discharge is planned for tomorrow. He requires acute inpatient rehab with 3 hours of daily therapies. He can benefit from home PT, OT and Speech Therapies. I edited the note.

## 2021-06-28 NOTE — PROGRESS NOTE ADULT - SUBJECTIVE AND OBJECTIVE BOX
Pain: Location: right sided headache    Ratin/10   Intervention: N/A  Orientation: Self /Place/ Event /Date (Month/Date/Year/Day/ Time)   Arousal Level: Alert  Behavior: Pleasant and cooperative  Affect Range: WFL     Needed: ? No       #: N/A   Attention: ? WFL   Insight into illness/deficits: Good  ?Treatment Session Focused on Mood / Coping

## 2021-06-28 NOTE — PROGRESS NOTE ADULT - SUBJECTIVE AND OBJECTIVE BOX
Patient is a 55y old  Male who presents with a chief complaint of CVA (09 Jun 2021 10:47)      HPI:  55 year-old male with a PMHx of hyperlipidemia and hypertension (non-compliant with meds) who presented to ED on 6/3/21 with complaint of headache, weakness, decreased sensation on the Right side of his face, and dizziness since 1 AM. Patient states he felt perfectly fine when he went to bed on evening of 6/2/21. NIHSS 3 on arrival to ED. Out of window for tPA. CT Head without contrast negative for acute intracranial hemorrhage or large territorial infarct, with 1 cm dural-based lesion of the Left parietal convexity, likely reflecting a meningioma. 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 Patient admitted to Neurology service for management of clinically diagnosed CVA. Patient started on Aspirin and high-intensity statin. +Patent foramen ovale on TTE. Patient with apparent Right-sided vocal cord paralysis, initially kept NPO with NG tube in place. SLP evaluated patient with Modified Barium Swallow, and patient cleared for regular diet with thin liquids the day of transfer to Rehab.    The patient was evaluated by PT, and required contact guard assist-minimal assist with bed mobility, minimal-moderate assist with transfers, and moderate assist with ambulation 25 ft x2 (once with RW and then holding onto wall railing). Prior to admission, patient was fully independent in ADLs and ambulation without an assistive device. The patient was evaluated by the Physiatry team on the Neurology service and was found to be a good candidate for acute inpatient rehab.    Prior level of function: Patient states he was independent with ADLs and ambulation without an assistive device.  Current level of function: Contact guard assist-minimal assist with bed mobility, minimal-moderate assist with transfers, and moderate assist with ambulation 25 ft x2 (once with RW and then holding onto wall railing). (08 Jun 2021 15:51)    Current level of function: Independent with bed mobility and transfers, supervision to ambulate outside over various surfaces with rollator, and supervision to ascend >18 steps with 1 HR (27 June 2021).    TODAY'S SUBJECTIVE & REVIEW OF SYMPTOMS:  Patient seen and examined at bedside during morning rounds. No acute events overnight. Patient states he is feeling well. Denies any concerns/complaints.      Review of Systems:   Constitutional:    [ x ] WNL           [   ] poor appetite   [   ] insomnia   [   ] tired   Cardio:                [ x ] WNL           [   ] CP   [   ] ETIENNE   [   ] palpitations               Resp:                   [ x ] WNL           [   ] SOB   [  ] cough   [   ] wheezing   GI:                        [ x ] WNL           [   ] constipation   [   ] diarrhea   [   ] abdominal pain   [   ] nausea   [   ] emesis                                :                      [ x ] WNL           [   ] WESTBROOK  [   ] dysuria   [   ] difficulty voiding             Endo:                   [ x ] WNL          [   ] polyuria   [   ] temperature intolerance                 Skin:                     [ x ] WNL          [   ] pain   [   ] wound   [   ] rash   MSK:                    [ x ] WNL          [   ] muscle pain   [   ] joint pain/ stiffness   [   ] muscle tenderness   [   ] swelling   Neuro:                 [  ] WNL          [ x ] Right-sided HA improving  [   ] change in vision   [   ] tremor   [ x ] ataxia on right side more than weakness   [   ]dysphagia              Cognitive:           [ x ] WNL           [   ]confusion      Psych:                  [ x ] WNL           [   ] hallucinations   [   ]agitation   [   ] delusion   [   ]depression      PHYSICAL EXAM    Vital Signs Last 24 Hrs  T(C): 36.8 (28 Jun 2021 05:20), Max: 36.8 (27 Jun 2021 20:31)  T(F): 98.3 (28 Jun 2021 05:20), Max: 98.3 (27 Jun 2021 20:31)  HR: 83 (28 Jun 2021 05:20) (83 - 89)  BP: 120/58 (28 Jun 2021 05:20) (120/58 - 139/63)  BP(mean): --  RR: 18 (28 Jun 2021 05:20) (18 - 19)  SpO2: --    General:[ x ] NAD, Resting Comfortable,   [   ] other:                                HEENT: [ x ] NC/AT, EOMI, PERRL , Normal Conjunctivae,   [ x  ] other: vertical nystagmus  Cardio: [ x ] RRR, no murmur,   [   ] other:                              Pulm: [ x ] No Respiratory Distress,  Lungs CTAB,   [   ] other:                       Abdomen: [ x ]ND/NT, Soft,   [   ] other:    : [ x ] NO WESTBROOK CATHETER, [   ] WESTBROOK CATHETER- no meatal tear, no discharge, [   ] other:                                            MSK: [ x ] No joint swelling, Full ROM,   [   ] other:                                         Ext: [ x ]No C/C/E, No calf tenderness,   [   ]other:    Skin: [ x ]intact,   [   ] other:                                                                   Neurological Examination:  Cognitive: [ x ] AAO x 3,   [    ]  other:                                                                      Attention:  [ x ] intact,   [    ]  other:                            Memory: [ x ] intact,    [    ]  other:     Mood/Affect: [ x ] wnl,    [    ]  other:                                                                             Communication: [ x ]Fluent, no dysarthria, following commands:  [    ] other:   CN II - XII:  [  ] intact,  [   x ] other: decreased sensation of right side of face, r sided facial droop; weakness of right eye closing                                                                                         Motor:   RIGHT UE: [   ] WNL,  [ x ] other: 4/5 shoulder abduction, elbow flexion/extension, and hand   LEFT    UE: [   ] WNL,  [ x ] other: 4/5 shoulder abduction, elbow flexion/extension, and hand   RIGHT LE: [   ] WNL,  [ x ] other: 4/5 hip flexion, knee flexion/extension, ankle dorsiflexion/plantarflexion  LEFT    LE: [   ] WNL,  [ x ] other: 4/5 hip flexion, knee flexion/extension, ankle dorsiflexion/plantarflexion    Tone: [ x ] wnl,   [    ]  other:  DTRs: [ x ]symmetric, [   ] other:  Coordination:   [ x ] intact, with bilateral finger-to-nose   [    ] other:                                                                           Sensory: [  ] Intact to light touch,   [ x ] other: intact to light touch for bilateral upper and lower extremities, but decreased sensation over Right ophthalmic, maxillary, and mandibular areas.    MEDICATIONS  (STANDING):  amLODIPine   Tablet 2.5 milliGRAM(s) Oral at bedtime  aspirin  chewable 81 milliGRAM(s) Oral daily  atorvastatin 80 milliGRAM(s) Oral at bedtime  enoxaparin Injectable 40 milliGRAM(s) SubCutaneous at bedtime  meclizine 12.5 milliGRAM(s) Oral every 8 hours  pantoprazole    Tablet 40 milliGRAM(s) Oral before breakfast    MEDICATIONS  (PRN):  acetaminophen   Tablet .. 975 milliGRAM(s) Oral every 6 hours PRN Temp greater or equal to 38C (100.4F), Mild Pain (1 - 3), Moderate Pain (4 - 6), Severe Pain (7 - 10)  aluminum hydroxide/magnesium hydroxide/simethicone Suspension 30 milliLiter(s) Oral every 4 hours PRN Dyspepsia  indomethacin 25 milliGRAM(s) Oral every 12 hours PRN cluster headaches      LABS:                            11.8   5.99  )-----------( 294      ( 28 Jun 2021 07:16 )             36.4     06-28    141  |  103  |  16  ----------------------------<  246<H>  5.2<H>   |  29  |  0.9    Ca    10.2<H>      28 Jun 2021 07:16  Mg     2.0     06-28    TPro  7.5  /  Alb  4.6  /  TBili  <0.2  /  DBili  x   /  AST  37  /  ALT  55<H>  /  AlkPhos  100  06-28

## 2021-06-28 NOTE — PROGRESS NOTE ADULT - ASSESSMENT
Rehab of clinically diagnosed CVA, right cerebellar penumbra, not seen on MRI by radiology but seen by neurologist, with impaired balance, unsteady gait, Right-sided facial numbness, and dysphagia. NIHSS 3. mRS 4. Neuro./Dr. Dow reviewed the MRI he sees a right pontine CVA.     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 Structural Cardio f/u as outpatient    #Hepatitis C Ab positive  - Hepatitis B Surface Ab and Core Ab positive, Hepatitis B Surface Ag negative  - Hepatitis C RNA PCR negative  - patient states he was worked up in the past and received some treatment for this one year ago in [place]. He was told that he did well with the treatment he received.  Outpatient GI F/U     #Vertigo, improving  - His BP is good when standing with PT.  - Meclizine 12.5 mg PO daily PRN for dizziness added. He reports some subjective vertigo.    # Dysphagia  - ENT previously consulted, noted laryngeal Right-sided vocal cord paralysis  - s/p NGT  - MBSS performed, patient cleared for DASH/TLC regular consistency diet by SLP on 6/8/21    #Hyperlipidemia  - Atorvastatin 80 mg PO nightly    #Hypertension  - Amlodipine 2.5 mg PO qhs.    #Headache  - as per neurology-on indomethacin as needed and O2 as needed while in the hospital for cluster headache. Headache stable.      -Pain control: Tylenol PRN    -GI/Bowel Mgmt: Pantoprazole 40 mg PO daily before breakfast, on Indomethacin    -Bladder management: Patient continent of bladder     -Skin:  No active issues at this time    -FEN     - Diet: DASH/TLC, regular consistency solids with thin liquids     Precautions / PROPHYLAXIS:      - Falls    - Weight bearing status: WBAT      - DVT prophylaxis: Lovenox 40 mg SubQ daily     Rehab of clinically diagnosed CVA, right cerebellar penumbra, not seen on MRI by radiology but seen by neurologist, with impaired balance, unsteady gait, Right-sided facial numbness, and dysphagia. NIHSS 3. mRS 4. Neuro./Dr. Dow reviewed the MRI he sees a right pontine CVA. he has right sided hemiataxia.    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 Structural Cardio f/u as outpatient    #Hepatitis C Ab positive  - Hepatitis B Surface Ab and Core Ab positive, Hepatitis B Surface Ag negative  - Hepatitis C RNA PCR negative  - patient states he was worked up in the past and received some treatment for this one year ago in [place]. He was told that he did well with the treatment he received.  Outpatient GI F/U     #Vertigo, improving  - His BP is good when standing with PT.  - Meclizine 12.5 mg PO daily PRN for dizziness added. He reports some subjective vertigo.    # Dysphagia  - ENT previously consulted, noted laryngeal Right-sided vocal cord paralysis  - s/p NGT  - MBSS performed, patient cleared for DASH/TLC regular consistency diet by SLP on 6/8/21    #Hyperlipidemia  - Atorvastatin 80 mg PO nightly    #Hypertension  - Amlodipine 2.5 mg PO qhs.    #Headache  - as per neurology-on indomethacin as needed and O2 as needed while in the hospital for cluster headache. Headache stable.      -Pain control: Tylenol PRN    -GI/Bowel Mgmt: Pantoprazole 40 mg PO daily before breakfast, on Indomethacin    -Bladder management: Patient continent of bladder     -Skin:  No active issues at this time    -FEN     - Diet: DASH/TLC, regular consistency solids with thin liquids     Precautions / PROPHYLAXIS:      - Falls    - Weight bearing status: WBAT      - DVT prophylaxis: Lovenox 40 mg SubQ daily

## 2021-06-28 NOTE — PROGRESS NOTE ADULT - ASSESSMENT
Patient stated that his numbness and headaches were almost gone and dizziness was better. He was happy about the discharge tomorrow and talked about the plan and services. Mood remains positive however he was relieved to see his recovery. He stated that he walked a long distance today with the therapist and only needed the cane if he got dizzy. Safety issues discussed and patient has good safety awareness. He stated that he will use the rollator for longer distance and cane as needed. Talked about him not going back to work right away and reevaluate after home therapy. Patient agreed and stated that he will talk to his .     All goals met and patient is discharged home tomorrow.

## 2021-06-29 VITALS
DIASTOLIC BLOOD PRESSURE: 84 MMHG | RESPIRATION RATE: 18 BRPM | TEMPERATURE: 97 F | SYSTOLIC BLOOD PRESSURE: 130 MMHG | HEART RATE: 94 BPM

## 2021-06-29 LAB — SARS-COV-2 RNA SPEC QL NAA+PROBE: SIGNIFICANT CHANGE UP

## 2021-06-29 RX ORDER — PANTOPRAZOLE SODIUM 20 MG/1
1 TABLET, DELAYED RELEASE ORAL
Qty: 0 | Refills: 0 | DISCHARGE
Start: 2021-06-29

## 2021-06-29 RX ADMIN — Medication 12.5 MILLIGRAM(S): at 06:15

## 2021-06-29 RX ADMIN — PANTOPRAZOLE SODIUM 40 MILLIGRAM(S): 20 TABLET, DELAYED RELEASE ORAL at 06:15

## 2021-06-29 RX ADMIN — Medication 25 MILLIGRAM(S): at 14:36

## 2021-06-29 RX ADMIN — Medication 12.5 MILLIGRAM(S): at 14:07

## 2021-06-29 RX ADMIN — Medication 81 MILLIGRAM(S): at 12:47

## 2021-06-29 NOTE — PROGRESS NOTE ADULT - PROVIDER SPECIALTY LIST ADULT
Physiatry
Rehab Medicine
Physiatry
Rehab Medicine
Rehab Medicine
Neuropsychology
Neuropsychology
Physiatry
Physiatry
Rehab Medicine
Neurology
Physiatry
Rehab Medicine
Physiatry
Physiatry
Rehab Medicine
Neuropsychology
Neuropsychology

## 2021-06-29 NOTE — PROGRESS NOTE ADULT - ATTENDING SUPERVISION STATEMENT
Resident
ACP
Resident

## 2021-06-29 NOTE — PROGRESS NOTE ADULT - REASON FOR ADMISSION
CVA

## 2021-06-29 NOTE — PROGRESS NOTE ADULT - NSICDXPILOT_GEN_ALL_CORE
[place]

## 2021-06-29 NOTE — PROGRESS NOTE ADULT - SUBJECTIVE AND OBJECTIVE BOX
Patient is a 55y old  Male who presents with a chief complaint of CVA (09 Jun 2021 10:47)      HPI:  55 year-old male with a PMHx of hyperlipidemia and hypertension (non-compliant with meds) who presented to ED on 6/3/21 with complaint of headache, weakness, decreased sensation on the Right side of his face, and dizziness since 1 AM. Patient states he felt perfectly fine when he went to bed on evening of 6/2/21. NIHSS 3 on arrival to ED. Out of window for tPA. CT Head without contrast negative for acute intracranial hemorrhage or large territorial infarct, with 1 cm dural-based lesion of the Left parietal convexity, likely reflecting a meningioma. 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 Patient admitted to Neurology service for management of clinically diagnosed CVA. Patient started on Aspirin and high-intensity statin. +Patent foramen ovale on TTE. Patient with apparent Right-sided vocal cord paralysis, initially kept NPO with NG tube in place. SLP evaluated patient with Modified Barium Swallow, and patient cleared for regular diet with thin liquids the day of transfer to Rehab.    The patient was evaluated by PT, and required contact guard assist-minimal assist with bed mobility, minimal-moderate assist with transfers, and moderate assist with ambulation 25 ft x2 (once with RW and then holding onto wall railing). Prior to admission, patient was fully independent in ADLs and ambulation without an assistive device. The patient was evaluated by the Physiatry team on the Neurology service and was found to be a good candidate for acute inpatient rehab.    Prior level of function: Patient states he was independent with ADLs and ambulation without an assistive device.  Current level of function: Contact guard assist-minimal assist with bed mobility, minimal-moderate assist with transfers, and moderate assist with ambulation 25 ft x2 (once with RW and then holding onto wall railing). (08 Jun 2021 15:51)    Current level of function: Independent with bed mobility and transfers, supervision to ambulate outside over various surfaces with rollator, and supervision to ascend >18 steps with 1 HR (27 June 2021).    TODAY'S SUBJECTIVE & REVIEW OF SYMPTOMS:  Patient seen and examined at bedside during morning rounds. No acute events overnight. Patient states he is feeling well. Denies any concerns/complaints. Looking forward to returning home today     Review of Systems:   Constitutional:    [ x ] WNL           [   ] poor appetite   [   ] insomnia   [   ] tired   Cardio:                [ x ] WNL           [   ] CP   [   ] ETIENNE   [   ] palpitations               Resp:                   [ x ] WNL           [   ] SOB   [  ] cough   [   ] wheezing   GI:                        [ x ] WNL           [   ] constipation   [   ] diarrhea   [   ] abdominal pain   [   ] nausea   [   ] emesis                                :                      [ x ] WNL           [   ] WESTBROOK  [   ] dysuria   [   ] difficulty voiding             Endo:                   [ x ] WNL          [   ] polyuria   [   ] temperature intolerance                 Skin:                     [ x ] WNL          [   ] pain   [   ] wound   [   ] rash   MSK:                    [ x ] WNL          [   ] muscle pain   [   ] joint pain/ stiffness   [   ] muscle tenderness   [   ] swelling   Neuro:                 [  ] WNL          [ x ] Right-sided HA improving  [   ] change in vision   [   ] tremor   [ x ] ataxia on right side more than weakness   [   ]dysphagia              Cognitive:           [ x ] WNL           [   ]confusion      Psych:                  [ x ] WNL           [   ] hallucinations   [   ]agitation   [   ] delusion   [   ]depression      PHYSICAL EXAM    Vital Signs Last 24 Hrs  T(C): 36.8 (28 Jun 2021 05:20), Max: 36.8 (27 Jun 2021 20:31)  T(F): 98.3 (28 Jun 2021 05:20), Max: 98.3 (27 Jun 2021 20:31)  HR: 83 (28 Jun 2021 05:20) (83 - 89)  BP: 120/58 (28 Jun 2021 05:20) (120/58 - 139/63)  BP(mean): --  RR: 18 (28 Jun 2021 05:20) (18 - 19)  SpO2: --    General:[ x ] NAD, Resting Comfortable,   [   ] other:                                HEENT: [ x ] NC/AT, EOMI, PERRL , Normal Conjunctivae,   [ x  ] other: vertical nystagmus  Cardio: [ x ] RRR, no murmur,   [   ] other:                              Pulm: [ x ] No Respiratory Distress,  Lungs CTAB,   [   ] other:                       Abdomen: [ x ]ND/NT, Soft,   [   ] other:    : [ x ] NO WESTBROOK CATHETER, [   ] WESTBROOK CATHETER- no meatal tear, no discharge, [   ] other:                                            MSK: [ x ] No joint swelling, Full ROM,   [   ] other:                                         Ext: [ x ]No C/C/E, No calf tenderness,   [   ]other:    Skin: [ x ]intact,   [   ] other:                                                                   Neurological Examination:  Cognitive: [ x ] AAO x 3,   [    ]  other:                                                                      Attention:  [ x ] intact,   [    ]  other:                            Memory: [ x ] intact,    [    ]  other:     Mood/Affect: [ x ] wnl,    [    ]  other:                                                                             Communication: [ x ]Fluent, no dysarthria, following commands:  [    ] other:   CN II - XII:  [  ] intact,  [   x ] other: decreased sensation of right side of face, r sided facial droop; weakness of right eye closing                                                                                         Motor:   RIGHT UE: [   ] WNL,  [ x ] other: 4/5 shoulder abduction, elbow flexion/extension, and hand   LEFT    UE: [   ] WNL,  [ x ] other: 4/5 shoulder abduction, elbow flexion/extension, and hand   RIGHT LE: [   ] WNL,  [ x ] other: 4/5 hip flexion, knee flexion/extension, ankle dorsiflexion/plantarflexion  LEFT    LE: [   ] WNL,  [ x ] other: 4/5 hip flexion, knee flexion/extension, ankle dorsiflexion/plantarflexion    Tone: [ x ] wnl,   [    ]  other:  DTRs: [ x ]symmetric, [   ] other:  Coordination:   [ x ] intact, with bilateral finger-to-nose   [    ] other:                                                                           Sensory: [  ] Intact to light touch,   [ x ] other: intact to light touch for bilateral upper and lower extremities, but decreased sensation over Right ophthalmic, maxillary, and mandibular areas.    MEDICATIONS  (STANDING):  amLODIPine   Tablet 2.5 milliGRAM(s) Oral at bedtime  aspirin  chewable 81 milliGRAM(s) Oral daily  atorvastatin 80 milliGRAM(s) Oral at bedtime  enoxaparin Injectable 40 milliGRAM(s) SubCutaneous at bedtime  meclizine 12.5 milliGRAM(s) Oral every 8 hours  pantoprazole    Tablet 40 milliGRAM(s) Oral before breakfast    MEDICATIONS  (PRN):  acetaminophen   Tablet .. 975 milliGRAM(s) Oral every 6 hours PRN Temp greater or equal to 38C (100.4F), Mild Pain (1 - 3), Moderate Pain (4 - 6), Severe Pain (7 - 10)  aluminum hydroxide/magnesium hydroxide/simethicone Suspension 30 milliLiter(s) Oral every 4 hours PRN Dyspepsia  indomethacin 25 milliGRAM(s) Oral every 12 hours PRN cluster headaches      LABS:                            11.8   5.99  )-----------( 294      ( 28 Jun 2021 07:16 )             36.4     06-28    141  |  103  |  16  ----------------------------<  246<H>  5.2<H>   |  29  |  0.9    Ca    10.2<H>      28 Jun 2021 07:16  Mg     2.0     06-28    TPro  7.5  /  Alb  4.6  /  TBili  <0.2  /  DBili  x   /  AST  37  /  ALT  55<H>  /  AlkPhos  100  06-28

## 2021-06-29 NOTE — PROGRESS NOTE ADULT - ASSESSMENT
Rehab of clinically diagnosed CVA, right cerebellar penumbra, not seen on MRI by radiology but seen by neurologist, with impaired balance, unsteady gait, Right-sided facial numbness, and dysphagia. NIHSS 3. mRS 4. Neuro./Dr. Dow reviewed the MRI he sees a right pontine CVA. he has right sided hemiataxia.    - Patient has completed has made great progress in rehab and has reached his goals. He is schedule to be discharged home today.  - CTA Head & Neck showed occlusion of the proximal V4 segment of the Right vertebral artery  - Aspirin 81 mg PO daily  - Atorvastatin 80 mg PO nightly    #Large patent foramen ovale seen on transthoracic echocardiogram. Structural Cardio f/u as outpatient    #Hepatitis C Ab positive  - Hepatitis B Surface Ab and Core Ab positive, Hepatitis B Surface Ag negative  - Hepatitis C RNA PCR negative  - patient states he was worked up in the past and received some treatment for this one year ago in [place]. He was told that he did well with the treatment he received.  Outpatient GI F/U     #Vertigo, improving  - His BP is good when standing with PT.  - Meclizine 12.5 mg PO daily PRN for dizziness added. He reports some subjective vertigo.    # Dysphagia  - ENT previously consulted, noted laryngeal Right-sided vocal cord paralysis  - s/p NGT  - MBSS performed, patient cleared for DASH/TLC regular consistency diet by SLP on 6/8/21    #Hyperlipidemia  - Atorvastatin 80 mg PO nightly    #Hypertension  - Amlodipine 2.5 mg PO qhs.    #Headache  - as per neurology-on indomethacin as needed and O2 as needed while in the hospital for cluster headache. Headache stable.      -Pain control: Tylenol PRN    -GI/Bowel Mgmt: Pantoprazole 40 mg PO daily before breakfast, on Indomethacin    -Bladder management: Patient continent of bladder     -Skin:  No active issues at this time    -FEN     - Diet: DASH/TLC, regular consistency solids with thin liquids     Precautions / PROPHYLAXIS:      - Falls    - Weight bearing status: WBAT      - DVT prophylaxis: Lovenox 40 mg SubQ daily     Rehab of clinically diagnosed CVA, right cerebellar penumbra, not seen on MRI by radiology but seen by neurologist, with impaired balance, unsteady gait, Right-sided facial numbness, and dysphagia. NIHSS 3. mRS 4. Neuro./Dr. Dow reviewed the MRI he sees a right pontine CVA. he has right sided hemiataxia.    - Patient has completed has made great progress in rehab and has reached his goals. He will be discharged to STR at a SNF for about 2 weeks before returning to the [facility]. He is a  and the sneed advised him that STR at a SNF is needed as there isn't help available.  - CTA Head & Neck showed occlusion of the proximal V4 segment of the Right vertebral artery  - Aspirin 81 mg PO daily  - Atorvastatin 80 mg PO nightly    #Large patent foramen ovale seen on transthoracic echocardiogram. Structural Cardio f/u as outpatient    #Hepatitis C Ab positive  - Hepatitis B Surface Ab and Core Ab positive, Hepatitis B Surface Ag negative  - Hepatitis C RNA PCR negative  - patient states he was worked up in the past and received some treatment for this one year ago in [place]. He was told that he did well with the treatment he received.  Outpatient GI F/U     #Vertigo, improving  - His BP is good when standing with PT.  - Meclizine 12.5 mg PO daily PRN for dizziness added. He reports some subjective vertigo.    # Dysphagia  - ENT previously consulted, noted laryngeal Right-sided vocal cord paralysis  - s/p NGT  - MBSS performed, patient cleared for DASH/TLC regular consistency diet by SLP on 6/8/21    #Hyperlipidemia  - Atorvastatin 80 mg PO nightly    #Hypertension  - Amlodipine 2.5 mg PO qhs.    #Headache  - as per neurology-on indomethacin as needed and O2 as needed while in the hospital for cluster headache. Headache stable.      -Pain control: Tylenol PRN    -GI/Bowel Mgmt: Pantoprazole 40 mg PO daily before breakfast, on Indomethacin    -Bladder management: Patient continent of bladder     -Skin:  No active issues at this time    -FEN     - Diet: DASH/TLC, regular consistency solids with thin liquids     Precautions / PROPHYLAXIS:      - Falls    - Weight bearing status: WBAT      - DVT prophylaxis: Lovenox 40 mg SubQ daily

## 2021-06-29 NOTE — PROGRESS NOTE ADULT - ATTENDING COMMENTS
Patient seen and examined with the resident. We discussed the case. I have directed the care. He has suffered a pontine CVA. He has been showing improvement. He has right sided hemiataxia. He has dysphagia. A tentative discharge is planned for tomorrow. He requires acute inpatient rehab with 3 hours of daily therapies. He can benefit from home PT, OT and Speech Therapies. I edited the note. Patient seen and examined with the resident. We discussed the case. I have directed the care. He has suffered a pontine CVA. He has been showing improvement. He has right sided hemiataxia. He has dysphagia. The patient is a . He received a call yesterday evening from the sneed and he was told to go to [facility] at a SNF. He was told that their isn't much help available at the [facility]. It was my intention to discharge him back to the [facility] today; however, now he will be discharged to a SNF for 2 weeks of subacute rehab including PT, OT and Speech.  This is not unreasonable. He has stairs in the rectory. He is agreement with the plan. His headache is improved and indomethacin prn is helpful. He did make wonderful improvement on the inpatient acute rehab unit. I edited the note.

## 2021-07-13 DIAGNOSIS — R13.19 OTHER DYSPHAGIA: ICD-10-CM

## 2021-07-13 DIAGNOSIS — I69.391 DYSPHAGIA FOLLOWING CEREBRAL INFARCTION: ICD-10-CM

## 2021-07-13 DIAGNOSIS — I69.392 FACIAL WEAKNESS FOLLOWING CEREBRAL INFARCTION: ICD-10-CM

## 2021-07-13 DIAGNOSIS — I69.398 OTHER SEQUELAE OF CEREBRAL INFARCTION: ICD-10-CM

## 2021-07-13 DIAGNOSIS — I69.351 HEMIPLEGIA AND HEMIPARESIS FOLLOWING CEREBRAL INFARCTION AFFECTING RIGHT DOMINANT SIDE: ICD-10-CM

## 2021-07-13 DIAGNOSIS — I69.393 ATAXIA FOLLOWING CEREBRAL INFARCTION: ICD-10-CM

## 2021-07-13 DIAGNOSIS — J38.01 PARALYSIS OF VOCAL CORDS AND LARYNX, UNILATERAL: ICD-10-CM

## 2021-07-13 DIAGNOSIS — Q21.1 ATRIAL SEPTAL DEFECT: ICD-10-CM

## 2021-07-13 DIAGNOSIS — Z79.01 LONG TERM (CURRENT) USE OF ANTICOAGULANTS: ICD-10-CM

## 2021-07-13 DIAGNOSIS — Z79.82 LONG TERM (CURRENT) USE OF ASPIRIN: ICD-10-CM

## 2021-07-13 DIAGNOSIS — I10 ESSENTIAL (PRIMARY) HYPERTENSION: ICD-10-CM

## 2021-07-13 DIAGNOSIS — Z91.14 PATIENT'S OTHER NONCOMPLIANCE WITH MEDICATION REGIMEN: ICD-10-CM

## 2021-07-13 DIAGNOSIS — E78.5 HYPERLIPIDEMIA, UNSPECIFIED: ICD-10-CM

## 2021-07-22 PROBLEM — Z00.00 ENCOUNTER FOR PREVENTIVE HEALTH EXAMINATION: Status: ACTIVE | Noted: 2021-07-22

## 2021-07-23 ENCOUNTER — APPOINTMENT (OUTPATIENT)
Dept: NEUROLOGY | Facility: CLINIC | Age: 56
End: 2021-07-23
Payer: COMMERCIAL

## 2021-07-23 VITALS
WEIGHT: 156 LBS | BODY MASS INDEX: 28.71 KG/M2 | TEMPERATURE: 97.6 F | DIASTOLIC BLOOD PRESSURE: 85 MMHG | HEIGHT: 62 IN | HEART RATE: 99 BPM | SYSTOLIC BLOOD PRESSURE: 158 MMHG | OXYGEN SATURATION: 99 %

## 2021-07-23 DIAGNOSIS — E78.5 HYPERLIPIDEMIA, UNSPECIFIED: ICD-10-CM

## 2021-07-23 DIAGNOSIS — Q21.1 ATRIAL SEPTAL DEFECT: ICD-10-CM

## 2021-07-23 DIAGNOSIS — G45.9 TRANSIENT CEREBRAL ISCHEMIC ATTACK, UNSPECIFIED: ICD-10-CM

## 2021-07-23 DIAGNOSIS — I10 ESSENTIAL (PRIMARY) HYPERTENSION: ICD-10-CM

## 2021-07-23 DIAGNOSIS — D32.9 BENIGN NEOPLASM OF MENINGES, UNSPECIFIED: ICD-10-CM

## 2021-07-23 PROCEDURE — 99072 ADDL SUPL MATRL&STAF TM PHE: CPT

## 2021-07-23 PROCEDURE — 99214 OFFICE O/P EST MOD 30 MIN: CPT

## 2021-07-23 RX ORDER — MECLIZINE HYDROCHLORIDE 12.5 MG/1
12.5 TABLET ORAL DAILY
Refills: 0 | Status: ACTIVE | COMMUNITY

## 2021-07-23 RX ORDER — AMLODIPINE BESYLATE 2.5 MG/1
2.5 TABLET ORAL DAILY
Refills: 0 | Status: ACTIVE | COMMUNITY

## 2021-07-23 RX ORDER — ACETAMINOPHEN 325 MG/1
325 TABLET ORAL EVERY 6 HOURS
Refills: 0 | Status: ACTIVE | COMMUNITY

## 2021-07-23 RX ORDER — ATORVASTATIN CALCIUM 80 MG/1
80 TABLET, FILM COATED ORAL
Refills: 0 | Status: ACTIVE | COMMUNITY

## 2021-07-23 RX ORDER — PANTOPRAZOLE 40 MG/1
40 TABLET, DELAYED RELEASE ORAL DAILY
Refills: 0 | Status: ACTIVE | COMMUNITY

## 2021-07-23 RX ORDER — ASPIRIN ENTERIC COATED TABLETS 81 MG 81 MG/1
81 TABLET, DELAYED RELEASE ORAL DAILY
Refills: 0 | Status: ACTIVE | COMMUNITY

## 2021-07-23 RX ORDER — INDOMETHACIN 25 MG/1
25 CAPSULE ORAL 3 TIMES DAILY
Refills: 0 | Status: ACTIVE | COMMUNITY

## 2021-07-23 NOTE — PHYSICAL EXAM
[FreeTextEntry1] : Focal neurological exam:\par \par MS: Awake, alert, oriented to person, place, situation and time. Normal affect. Follows commands. \par \par Language: Speech is clear, fluent with good repetition & comprehension. No dysarthria. \par \par CNs 2 - 12 intact. EOMI no nystagmus, no diplopia. VFF. Right lip droop, NLF looks symmetric, full eye closure strength b/l. Hearing grossly normal. Head turning & shoulder shrug intact b/l. Tongue midline, normal movements, no atrophy.\par \par Motor: Normal muscle bulk & tone. No noticeable tremor or seizure. No pronator drift. Muscle strength of b/l UE and b/l LE 5/5. \par \par Reflexes: DTR of biceps 1+, knees absent  \par \par Sensation: Mild decreased sensation to LT on Right face and RUE and RLE. Symmetric to temperature and vibration b/l throughout\par \par Cortical: No extinction\par \par Coordination: No dysmetria to FTN.\par \par Gait: No postural instability. Normal stance and tandem gait.\par \par \par \par \par

## 2021-07-23 NOTE — REASON FOR VISIT
[Follow-Up: _____] : a [unfilled] follow-up visit [FreeTextEntry1] : HFU for TIA in setting of large Left to Right shunt PFO during ED visit 6/3/21. \par

## 2021-07-23 NOTE — DISCUSSION/SUMMARY
[FreeTextEntry1] : Patient is 54 yo RHM with PMHx of HTN (noncompliant with meds), DLD who presents to clinic for HFU for TIA in setting of large Left to Right shunt PFO during ED visit 6/3/21. He initially presented to the ED on 6/3/21 with c/o of acute onset HA partially alleviated by 2 Advils and unsteady gait.  He also experienced difficulty walking and trouble coordinating his right side and perioral numbness. CTA/CTP shows R V4 segment occlusion with corresponding penumbra in Right cerebellum. MRI on 6/3/21 was negative for infarct. TTE with bubble showed large PFO, with predominantly Left to Right shunting as well as intra-atrial septal aneurysm. Of note, imaging also showed 1 cm dural based lesion of the left parietal convexity likely reflecting a meningioma without peritumoral edema. Today, he c/o of some numbness of his right cheek, slight R eye visual blurriness.  Exam is significant for slight R lip droop and decreased sensation to LT on RUE/RLE and Right face. This does not correspond with area of occlusion. PFO is also shunting in the opposite direction. His LDl was 91 and A1c 5.8. \par \par PLAN: \par -C/w Atorvastatin 80 and repeat lipid panel next visit \par -C/w ASA 81 QD \par -F/u with Dr. Sandhu for PFO and 30 day event monitoring \par -Hypercoagulable panel \par -Follow up with MRI with contrast in 6 months for meningioma   \par -Follow up in clinic in 4 months  [Antithrombotic therapy with ___] : antithrombotic therapy with  [unfilled] [Intensive Blood Pressure Control] : intensive blood pressure control [Lipid Lowering Therapy] : lipid lowering therapy [Patient encouraged to discuss with Primary MD] : I encouraged the patient to discuss these important issues with ~his/her~ primary care doctor [Goals and Counseling] : I have reviewed the goals of stroke risk factor modification. I counseled the patient on measures to reduce stroke risk, including the importance of medication compliance, risk factor control, exercise, healthy diet and avoidance of smoking. I reviewed stroke warning signs and symptoms and appropriate actions to take if such occur.

## 2021-07-23 NOTE — HISTORY OF PRESENT ILLNESS
[FreeTextEntry1] : Patient is 54 yo RHM with PMHx of HTN (noncompliant with meds), DLD who presents to clinic for HFU for TIA in setting of large Left to Right shunt PFO during ED visit 6/3/21. \par \par He initially presented to the ED on 6/3/21 with c/o of acute onset HA partially alleviated by 2 Advils and unsteady gait.  He also experienced difficulty walking and trouble coordinating his right side and perioral numbness. Upon arrival to the ED, NIHSS 3 for ataxia and facial droop. No tPA was given as outside window. CTA/CTP shows R V4 segment occlusion with corresponding penumbra in Right cerebellum.  Slight irregularity of the lateral wall of the right cavernous ICA without discrete aneurysm. Not an intervention candidate due low NIHSS. MRI on 6/3/21 was negative for infarct. TTE with bubble showed large PFO, with predominantly Left to Right shunting as well as intra-atrial septal aneurysm. He was discharged on ASA 81 and Atorvastatin 80. Of note, imaging also showed 1 cm dural based lesion of the left parietal convexity likely reflecting a meningioma without peritumoral edema.  LDL 91, A1c 5.8.\par \par Patient presents today and states he continues to have some numbness of his right cheek, slight R eye visual blurriness.  At times, he needs to take deep breathes associated with some dizziness.  \par \par

## 2021-11-01 ENCOUNTER — APPOINTMENT (OUTPATIENT)
Dept: NEUROLOGY | Facility: CLINIC | Age: 56
End: 2021-11-01

## 2022-03-09 ENCOUNTER — EMERGENCY (EMERGENCY)
Facility: HOSPITAL | Age: 57
LOS: 0 days | Discharge: HOME | End: 2022-03-10
Attending: EMERGENCY MEDICINE | Admitting: EMERGENCY MEDICINE
Payer: COMMERCIAL

## 2022-03-09 VITALS
RESPIRATION RATE: 20 BRPM | TEMPERATURE: 98 F | HEIGHT: 62 IN | DIASTOLIC BLOOD PRESSURE: 70 MMHG | SYSTOLIC BLOOD PRESSURE: 146 MMHG | OXYGEN SATURATION: 100 % | HEART RATE: 98 BPM

## 2022-03-09 DIAGNOSIS — Z86.73 PERSONAL HISTORY OF TRANSIENT ISCHEMIC ATTACK (TIA), AND CEREBRAL INFARCTION WITHOUT RESIDUAL DEFICITS: ICD-10-CM

## 2022-03-09 DIAGNOSIS — Z79.82 LONG TERM (CURRENT) USE OF ASPIRIN: ICD-10-CM

## 2022-03-09 DIAGNOSIS — I10 ESSENTIAL (PRIMARY) HYPERTENSION: ICD-10-CM

## 2022-03-09 DIAGNOSIS — R16.0 HEPATOMEGALY, NOT ELSEWHERE CLASSIFIED: ICD-10-CM

## 2022-03-09 DIAGNOSIS — R79.89 OTHER SPECIFIED ABNORMAL FINDINGS OF BLOOD CHEMISTRY: ICD-10-CM

## 2022-03-09 DIAGNOSIS — Z86.19 PERSONAL HISTORY OF OTHER INFECTIOUS AND PARASITIC DISEASES: ICD-10-CM

## 2022-03-09 DIAGNOSIS — Z20.822 CONTACT WITH AND (SUSPECTED) EXPOSURE TO COVID-19: ICD-10-CM

## 2022-03-09 DIAGNOSIS — K62.5 HEMORRHAGE OF ANUS AND RECTUM: ICD-10-CM

## 2022-03-09 DIAGNOSIS — E78.5 HYPERLIPIDEMIA, UNSPECIFIED: ICD-10-CM

## 2022-03-09 DIAGNOSIS — D64.9 ANEMIA, UNSPECIFIED: ICD-10-CM

## 2022-03-09 LAB
ABO RH CONFIRMATION: SIGNIFICANT CHANGE UP
ALBUMIN SERPL ELPH-MCNC: 3.2 G/DL — LOW (ref 3.5–5.2)
ALP SERPL-CCNC: 239 U/L — HIGH (ref 30–115)
ALT FLD-CCNC: 111 U/L — HIGH (ref 0–41)
ANION GAP SERPL CALC-SCNC: 13 MMOL/L — SIGNIFICANT CHANGE UP (ref 7–14)
AST SERPL-CCNC: 190 U/L — HIGH (ref 0–41)
BASOPHILS # BLD AUTO: 0.16 K/UL — SIGNIFICANT CHANGE UP (ref 0–0.2)
BASOPHILS NFR BLD AUTO: 2.3 % — HIGH (ref 0–1)
BILIRUB SERPL-MCNC: 0.4 MG/DL — SIGNIFICANT CHANGE UP (ref 0.2–1.2)
BLD GP AB SCN SERPL QL: SIGNIFICANT CHANGE UP
BUN SERPL-MCNC: 13 MG/DL — SIGNIFICANT CHANGE UP (ref 10–20)
CALCIUM SERPL-MCNC: 9.3 MG/DL — SIGNIFICANT CHANGE UP (ref 8.5–10.1)
CHLORIDE SERPL-SCNC: 98 MMOL/L — SIGNIFICANT CHANGE UP (ref 98–110)
CO2 SERPL-SCNC: 21 MMOL/L — SIGNIFICANT CHANGE UP (ref 17–32)
CREAT SERPL-MCNC: <0.5 MG/DL — LOW (ref 0.7–1.5)
EGFR: 158 ML/MIN/1.73M2 — SIGNIFICANT CHANGE UP
EOSINOPHIL # BLD AUTO: 0.17 K/UL — SIGNIFICANT CHANGE UP (ref 0–0.7)
EOSINOPHIL NFR BLD AUTO: 2.4 % — SIGNIFICANT CHANGE UP (ref 0–8)
GLUCOSE SERPL-MCNC: 88 MG/DL — SIGNIFICANT CHANGE UP (ref 70–99)
HCT VFR BLD CALC: 20.1 % — LOW (ref 42–52)
HGB BLD-MCNC: 9 G/DL — LOW (ref 14–18)
IMM GRANULOCYTES NFR BLD AUTO: 0.4 % — HIGH (ref 0.1–0.3)
LYMPHOCYTES # BLD AUTO: 2.4 K/UL — SIGNIFICANT CHANGE UP (ref 1.2–3.4)
LYMPHOCYTES # BLD AUTO: 34.2 % — SIGNIFICANT CHANGE UP (ref 20.5–51.1)
MCHC RBC-ENTMCNC: 31.4 PG — HIGH (ref 27–31)
MCHC RBC-ENTMCNC: 44.8 G/DL — HIGH (ref 32–37)
MCV RBC AUTO: 70 FL — LOW (ref 80–94)
MONOCYTES # BLD AUTO: 0.63 K/UL — HIGH (ref 0.1–0.6)
MONOCYTES NFR BLD AUTO: 9 % — SIGNIFICANT CHANGE UP (ref 1.7–9.3)
NEUTROPHILS # BLD AUTO: 3.62 K/UL — SIGNIFICANT CHANGE UP (ref 1.4–6.5)
NEUTROPHILS NFR BLD AUTO: 51.7 % — SIGNIFICANT CHANGE UP (ref 42.2–75.2)
NRBC # BLD: 0 /100 WBCS — SIGNIFICANT CHANGE UP (ref 0–0)
PLATELET # BLD AUTO: 879 K/UL — HIGH (ref 130–400)
POTASSIUM SERPL-MCNC: 4.5 MMOL/L — SIGNIFICANT CHANGE UP (ref 3.5–5)
POTASSIUM SERPL-SCNC: 4.5 MMOL/L — SIGNIFICANT CHANGE UP (ref 3.5–5)
PROT SERPL-MCNC: 7.4 G/DL — SIGNIFICANT CHANGE UP (ref 6–8)
RBC # BLD: 2.87 M/UL — LOW (ref 4.7–6.1)
RBC # FLD: 21.7 % — HIGH (ref 11.5–14.5)
SODIUM SERPL-SCNC: 132 MMOL/L — LOW (ref 135–146)
WBC # BLD: 7.01 K/UL — SIGNIFICANT CHANGE UP (ref 4.8–10.8)
WBC # FLD AUTO: 7.01 K/UL — SIGNIFICANT CHANGE UP (ref 4.8–10.8)

## 2022-03-09 PROCEDURE — 99220: CPT | Mod: FS

## 2022-03-09 NOTE — ED PROVIDER NOTE - PROGRESS NOTE DETAILS
Pt has endoscopy sched tomorrow and d/w pt all options as endoscopy likely therapeutic and diagnostic, anemia likely 2/2 med induced gastritis/upper GI bleed. TPC with  who lm for pt last night for more information and to coordinate, reports endoscopy with Dr.Penn mcgovern for iain 3/10 at 11:30AM and will be performed as long as pts hgb above 8. advised to repeat CBC after transfusions and can DC pt to undergo procedure as long as he has repeat CBC results.

## 2022-03-09 NOTE — ED PROVIDER NOTE - OBJECTIVE STATEMENT
pt with pmhx hep C, CVA, rectal bleeding since started ASA post CVA currently undergoing GI evaluation by  in [place]. he is sched for endoscopy tomorrow and had labs prior, was called by  with low hgb 5.5 per a message left on his VM last night. no black stool/bloody stool today. Denies fever/chill/HA/dizziness/chest pain/palpitation/sob/abd pain/n/v/d/urinary sxs pt with pmhx hep C, HTN, HLD, CVA, rectal bleeding since started ASA post CVA currently undergoing GI evaluation by  in [place]. he is sched for endoscopy tomorrow and had labs prior, was called by  with low hgb 5.5 per a message left on his VM last night. no black stool/bloody stool today. Denies fever/chill/HA/dizziness/chest pain/palpitation/sob/abd pain/n/v/d/urinary sxs

## 2022-03-09 NOTE — ED ADULT NURSE NOTE - OBJECTIVE STATEMENT
Pt sent to ED by MD for anemia. Pt states he was told his hemoglobin was 5. Pt reports abdominal pain x1 month and black stools. Pt due for colonoscopy tomorrow.

## 2022-03-09 NOTE — ED CDU PROVIDER INITIAL DAY NOTE - OBJECTIVE STATEMENT
55 yo M with PMHx of Hep C, HTN, HLD, CVA and rectal bleeding that started after he had CVA and was started on ASA (06/2021), currently undergoing evaluation by Dr. Chávez in [place] presents to the ED for anemia. Pt had blood work showing hemoglobin of 5.5. Pt states today his stools have been normal. He is scheduled for endoscopy tomorrow morning. 55 yo M with PMHx of Hep C s/p Harvoni, HTN, HLD, CVA and rectal bleeding that started after he had CVA and was started on ASA (06/2021), currently undergoing evaluation by Dr. Chávez in [place] presents to the ED for anemia. Pt had blood work showing hemoglobin of 5.5. Pt states today his stools have been normal. He is scheduled for endoscopy tomorrow morning. Pt has also had upper abdominal pain x 1 month.

## 2022-03-09 NOTE — ED ADULT TRIAGE NOTE - CHIEF COMPLAINT QUOTE
patient sent in for hgb of 5 - was due for scope tomorrow. patient reports black stools  and abdominal painfor one month

## 2022-03-09 NOTE — ED CDU PROVIDER INITIAL DAY NOTE - NS ED ROS FT
Review of Systems  Constitutional:  No fever, chills.  Eyes:  No visual changes, eye pain, or discharge.  ENMT:  No hearing changes, pain, or discharge. No nasal congestion, discharge, or bleeding. No throat pain, swelling, or difficulty swallowing.  Cardiac:  No chest pain, palpitations, syncope, or edema.  Respiratory:  No dyspnea, cough. No hemoptysis.  GI:  No nausea, vomiting, diarrhea, or abdominal pain. No melena or hematochezia.  :  No dysuria, hematuria, frequency, or burning.   MS:  No back pain.  Skin:  No skin rash, pruritis, jaundice, or lesions.  Neuro:  No headache, dizziness, loss of sensation, or focal weakness.  No change in mental status. Review of Systems  Constitutional:  No fever, chills.  Eyes:  No visual changes, eye pain, or discharge.  ENMT:  No hearing changes, pain, or discharge. No nasal congestion, discharge, or bleeding. No throat pain, swelling, or difficulty swallowing.  Cardiac:  No chest pain, palpitations, syncope, or edema.  Respiratory:  No dyspnea, cough. No hemoptysis.  GI:  No nausea, vomiting, diarrhea,. (+) abd pain  :  No dysuria, hematuria, frequency, or burning.   MS:  No back pain.  Skin:  No skin rash, pruritis, jaundice, or lesions.  Neuro:  No headache, dizziness, loss of sensation, or focal weakness.  No change in mental status.

## 2022-03-09 NOTE — ED ADULT NURSE REASSESSMENT NOTE - NS ED NURSE REASSESS COMMENT FT1
patient receiving 2nd unit of RPBC, tolerating well with no signs of transfusion reaction noted.  Will continue to monitor.

## 2022-03-09 NOTE — ED ADULT NURSE REASSESSMENT NOTE - NS ED NURSE REASSESS COMMENT FT1
patient received 1 unit of RPBC, tolerating well with no signs of transfusion reaction noted. Will continue to monitor.

## 2022-03-09 NOTE — ED PROVIDER NOTE - PHYSICAL EXAMINATION
CONSTITUTIONAL: Well-appearing; well-nourished; in no apparent distress.   NECK: Supple; non-tender; no cervical lymphadenopathy.   CARDIOVASCULAR: Normal S1, S2; no murmurs, rubs, or gallops.   RESPIRATORY: Normal chest excursion with respiration; breath sounds clear and equal bilaterally; no wheezes, rhonchi, or rales.  GI/: Normal bowel sounds; non-distended; non-tender; no palpable organomegaly.   MS: Normal ROM in all four extremities; non-tender to palpation; distal pulses are normal.   SKIN: Normal for age and race; warm; dry; good turgor; no apparent lesions or exudate.   NEURO/PSYCH: A & O x 4; grossly unremarkable. mood and manner are appropriate.

## 2022-03-09 NOTE — ED ADULT NURSE REASSESSMENT NOTE - NS ED NURSE REASSESS COMMENT FT1
patient tolerated transfusion well with no signs of transfusion reaction noted.  Patient waiting for 2nd unit will continue to monitor.

## 2022-03-09 NOTE — ED CDU PROVIDER INITIAL DAY NOTE - PHYSICAL EXAMINATION
VITAL SIGNS: I have reviewed nursing notes and confirm.  CONSTITUTIONAL: Well-developed; well-nourished; in no acute distress.  SKIN: Skin exam is warm and dry, no acute rash.  HEAD: Normocephalic; atraumatic.  EYES: Conjunctiva and sclera clear.  ENT: No nasal discharge; airway clear.  CARD: S1, S2 normal; no murmurs, gallops, or rubs. Regular rate and rhythm.  RESP: No wheezes, rales or rhonchi. Speaking in full sentences.   ABD: Normal bowel sounds; soft; non-distended; (+) RUQ TTP; No rebound or guarding. No CVA tenderness.  EXT: Normal ROM.   NEURO: Alert, oriented. Grossly unremarkable. No focal deficits.

## 2022-03-10 ENCOUNTER — TRANSCRIPTION ENCOUNTER (OUTPATIENT)
Age: 57
End: 2022-03-10

## 2022-03-10 VITALS
DIASTOLIC BLOOD PRESSURE: 79 MMHG | HEART RATE: 85 BPM | SYSTOLIC BLOOD PRESSURE: 146 MMHG | OXYGEN SATURATION: 98 % | TEMPERATURE: 98 F | RESPIRATION RATE: 18 BRPM

## 2022-03-10 LAB
APTT BLD: 33.3 SEC — SIGNIFICANT CHANGE UP (ref 27.5–35.5)
BASOPHILS # BLD AUTO: 0.17 K/UL — SIGNIFICANT CHANGE UP (ref 0–0.2)
BASOPHILS NFR BLD AUTO: 2.6 % — HIGH (ref 0–1)
EOSINOPHIL # BLD AUTO: 0.18 K/UL — SIGNIFICANT CHANGE UP (ref 0–0.7)
EOSINOPHIL NFR BLD AUTO: 2.8 % — SIGNIFICANT CHANGE UP (ref 0–8)
HCT VFR BLD CALC: 29 % — LOW (ref 42–52)
HGB BLD-MCNC: 8.4 G/DL — LOW (ref 14–18)
IMM GRANULOCYTES NFR BLD AUTO: 0.5 % — HIGH (ref 0.1–0.3)
INR BLD: 1.25 RATIO — HIGH (ref 0.88–1.16)
LIDOCAIN IGE QN: 61 U/L — HIGH (ref 7–60)
LYMPHOCYTES # BLD AUTO: 2 K/UL — SIGNIFICANT CHANGE UP (ref 1.2–3.4)
LYMPHOCYTES # BLD AUTO: 30.9 % — SIGNIFICANT CHANGE UP (ref 20.5–51.1)
MCHC RBC-ENTMCNC: 21.8 PG — LOW (ref 27–31)
MCHC RBC-ENTMCNC: 28.9 G/DL — LOW (ref 32–37)
MCV RBC AUTO: 75.1 FL — LOW (ref 80–94)
MONOCYTES # BLD AUTO: 0.57 K/UL — SIGNIFICANT CHANGE UP (ref 0.1–0.6)
MONOCYTES NFR BLD AUTO: 8.8 % — SIGNIFICANT CHANGE UP (ref 1.7–9.3)
NEUTROPHILS # BLD AUTO: 3.53 K/UL — SIGNIFICANT CHANGE UP (ref 1.4–6.5)
NEUTROPHILS NFR BLD AUTO: 54.4 % — SIGNIFICANT CHANGE UP (ref 42.2–75.2)
NRBC # BLD: 0 /100 WBCS — SIGNIFICANT CHANGE UP (ref 0–0)
PLATELET # BLD AUTO: 613 K/UL — HIGH (ref 130–400)
PROTHROM AB SERPL-ACNC: 14.5 SEC — HIGH (ref 10.5–13.4)
RBC # BLD: 3.86 M/UL — LOW (ref 4.7–6.1)
RBC # FLD: 22 % — HIGH (ref 11.5–14.5)
SARS-COV-2 RNA SPEC QL NAA+PROBE: SIGNIFICANT CHANGE UP
WBC # BLD: 6.48 K/UL — SIGNIFICANT CHANGE UP (ref 4.8–10.8)
WBC # FLD AUTO: 6.48 K/UL — SIGNIFICANT CHANGE UP (ref 4.8–10.8)

## 2022-03-10 PROCEDURE — 76705 ECHO EXAM OF ABDOMEN: CPT | Mod: 26

## 2022-03-10 PROCEDURE — 99217: CPT | Mod: FS

## 2022-03-10 PROCEDURE — 71260 CT THORAX DX C+: CPT | Mod: 26,MA

## 2022-03-10 PROCEDURE — 74177 CT ABD & PELVIS W/CONTRAST: CPT | Mod: 26,MA

## 2022-03-10 NOTE — ED CDU PROVIDER DISPOSITION NOTE - NS ED ATTENDING STATEMENT MOD
This was a shared visit with the LASHELL. I reviewed and verified the documentation and independently performed the documented:

## 2022-03-10 NOTE — ED CDU PROVIDER DISPOSITION NOTE - PATIENT PORTAL LINK FT
You can access the FollowMyHealth Patient Portal offered by [facility] by registering at the following website: [email or web address]. By joining "[employer]’s FollowMyHealth portal, you will also be able to view your health information using other applications (apps) compatible with our system.

## 2022-03-10 NOTE — ED CDU PROVIDER DISPOSITION NOTE - NSFOLLOWUPINSTRUCTIONS_ED_ALL_ED_FT
YOU WERE FOUND TO HAVE LIVER MASSES ON CT/ULTRASOUND. YOU NEED TO FOLLOW-UP AS SOON AS POSSIBLE WITH YOUR PRIMARY/GASTROENTEROLOGIST FOR FURTHER WORKUP.     Anemia    Anemia is a condition in which the concentration of red blood cells or hemoglobin in the blood is below normal. Hemoglobin is a substance in red blood cells that carries oxygen to the tissues of the body. Anemia results in not enough oxygen reaching these tissues which can cause symptoms such as weakness, dizziness/lightheadedness, shortness of breath, chest pain, paleness, or nausea. The cause of your anemia may or may not be determined immediately. If your hemoglobin was dangerously low, you may have received a blood transfusion. Usually reactions to transfusions occur immediately but monitor yourself for any fevers, rash, or shortness of breath.    SEEK IMMEDIATE MEDICAL CARE IF YOU HAVE ANY OF THE FOLLOWING SYMPTOMS: extreme weakness/chest pain/shortness of breath, black or bloody stools, vomiting blood, fainting, fever, or any signs of dehydration.

## 2022-03-10 NOTE — ED CDU PROVIDER DISPOSITION NOTE - PROVIDER TOKENS
FREE:[LAST:[YOUR GASTROENTEROLOGIST],PHONE:[(   )    -],FAX:[(   )    -],FOLLOWUP:[Urgent]],FREE:[LAST:[YOUR PRIMARY DOCTOR],PHONE:[(   )    -],FAX:[(   )    -],FOLLOWUP:[Urgent]]

## 2022-03-10 NOTE — ED CDU PROVIDER DISPOSITION NOTE - CARE PROVIDER_API CALL
YOUR GASTROENTEROLOGIST,   Phone: (   )    -  Fax: (   )    -  Follow Up Time: Urgent    YOUR PRIMARY DOCTOR,   Phone: (   )    -  Fax: (   )    -  Follow Up Time: Urgent

## 2022-03-10 NOTE — ED CDU PROVIDER SUBSEQUENT DAY NOTE - MEDICAL DECISION MAKING DETAILS
EDOU for blood transfusion, pending op EGD iain AM - during obs stay pt c/o abd pain, labs w/transaminitis, abd imaging as resulted incl liver w/multiple masses, ddx [facility] v HCC, as well as other incidental findings - repeat Hg >8, imaging findings were d/c pt's private GI who is still comfortable performing op EGD later today as planned - all results INCL ALL IMAGING RESTULS d/w pt & copies given, strict return precautions discussed, rec urgent op PCP/GI f/u, will go for EGD with his private GI as planned for later today

## 2022-03-10 NOTE — ED ADULT NURSE REASSESSMENT NOTE - NS ED NURSE REASSESS COMMENT FT1
patient received 1 unit RPBC, tolerating well with no signs of transfusion reaction noted. Will continue to monitor.

## 2022-03-10 NOTE — ED ADULT NURSE REASSESSMENT NOTE - NS ED NURSE REASSESS COMMENT FT1
patient completed RPBC transfusion, tolerated well with no signs of transfusion reaction noted. Patient states "I feel better"  Will continue to monitor.

## 2022-03-10 NOTE — ED CDU PROVIDER SUBSEQUENT DAY NOTE - PROGRESS NOTE DETAILS
Pt resting comfortably, awaiting repeat CBC. Repeat hemoglobin >8. Discussed all results and provided copy to pt. Pt aware of liver masses and need to urgently be evaluated as this is potentially malignancy. Pt being discharged to have endoscopy by his GI doctor today at 1030AM. Spoke with covering MD Dr. Silverio and made aware of potential liver maligancy--he will inform pts GI doctor Dr. Chávez. Pt currently feels well and is comfortable with discharge. Return precautions provided.

## 2022-03-20 ENCOUNTER — OUTPATIENT (OUTPATIENT)
Dept: OUTPATIENT SERVICES | Facility: HOSPITAL | Age: 57
LOS: 1 days | Discharge: HOME | End: 2022-03-20
Payer: COMMERCIAL

## 2022-03-20 DIAGNOSIS — R16.0 HEPATOMEGALY, NOT ELSEWHERE CLASSIFIED: ICD-10-CM

## 2022-03-20 PROCEDURE — 74183 MRI ABD W/O CNTR FLWD CNTR: CPT | Mod: 26

## 2022-08-09 ENCOUNTER — EMERGENCY (EMERGENCY)
Facility: HOSPITAL | Age: 57
LOS: 0 days | Discharge: HOME | End: 2022-08-09
Attending: EMERGENCY MEDICINE | Admitting: EMERGENCY MEDICINE

## 2022-08-09 VITALS — HEIGHT: 62 IN | WEIGHT: 160.06 LBS

## 2022-08-09 VITALS
DIASTOLIC BLOOD PRESSURE: 71 MMHG | TEMPERATURE: 99 F | SYSTOLIC BLOOD PRESSURE: 140 MMHG | OXYGEN SATURATION: 96 % | HEART RATE: 97 BPM | RESPIRATION RATE: 22 BRPM

## 2022-08-09 DIAGNOSIS — I10 ESSENTIAL (PRIMARY) HYPERTENSION: ICD-10-CM

## 2022-08-09 DIAGNOSIS — Z79.82 LONG TERM (CURRENT) USE OF ASPIRIN: ICD-10-CM

## 2022-08-09 DIAGNOSIS — C34.90 MALIGNANT NEOPLASM OF UNSPECIFIED PART OF UNSPECIFIED BRONCHUS OR LUNG: ICD-10-CM

## 2022-08-09 DIAGNOSIS — Z79.899 OTHER LONG TERM (CURRENT) DRUG THERAPY: ICD-10-CM

## 2022-08-09 DIAGNOSIS — K06.8 OTHER SPECIFIED DISORDERS OF GINGIVA AND EDENTULOUS ALVEOLAR RIDGE: ICD-10-CM

## 2022-08-09 DIAGNOSIS — E78.5 HYPERLIPIDEMIA, UNSPECIFIED: ICD-10-CM

## 2022-08-09 LAB
ALBUMIN SERPL ELPH-MCNC: 4.5 G/DL — SIGNIFICANT CHANGE UP (ref 3.5–5.2)
ALP SERPL-CCNC: 120 U/L — HIGH (ref 30–115)
ALT FLD-CCNC: 139 U/L — HIGH (ref 0–41)
ANION GAP SERPL CALC-SCNC: 9 MMOL/L — SIGNIFICANT CHANGE UP (ref 7–14)
APTT BLD: 32 SEC — SIGNIFICANT CHANGE UP (ref 27–39.2)
AST SERPL-CCNC: 219 U/L — HIGH (ref 0–41)
BASOPHILS # BLD AUTO: 0.1 K/UL — SIGNIFICANT CHANGE UP (ref 0–0.2)
BASOPHILS NFR BLD AUTO: 1.8 % — HIGH (ref 0–1)
BILIRUB SERPL-MCNC: 0.4 MG/DL — SIGNIFICANT CHANGE UP (ref 0.2–1.2)
BLD GP AB SCN SERPL QL: SIGNIFICANT CHANGE UP
BUN SERPL-MCNC: 12 MG/DL — SIGNIFICANT CHANGE UP (ref 10–20)
CALCIUM SERPL-MCNC: 10 MG/DL — SIGNIFICANT CHANGE UP (ref 8.5–10.1)
CHLORIDE SERPL-SCNC: 96 MMOL/L — LOW (ref 98–110)
CO2 SERPL-SCNC: 27 MMOL/L — SIGNIFICANT CHANGE UP (ref 17–32)
CREAT SERPL-MCNC: 0.9 MG/DL — SIGNIFICANT CHANGE UP (ref 0.7–1.5)
DAT IGG-SP REAG RBC-IMP: ABNORMAL
DIR ANTIGLOB POLYSPECIFIC INTERPRETATION: ABNORMAL
EGFR: 100 ML/MIN/1.73M2 — SIGNIFICANT CHANGE UP
EOSINOPHIL # BLD AUTO: 0.11 K/UL — SIGNIFICANT CHANGE UP (ref 0–0.7)
EOSINOPHIL NFR BLD AUTO: 2 % — SIGNIFICANT CHANGE UP (ref 0–8)
GLUCOSE SERPL-MCNC: 133 MG/DL — HIGH (ref 70–99)
HCT VFR BLD CALC: 33.7 % — LOW (ref 42–52)
HGB BLD-MCNC: 10.3 G/DL — LOW (ref 14–18)
IAT COMP-SP REAG SERPL QL: SIGNIFICANT CHANGE UP
IMM GRANULOCYTES NFR BLD AUTO: 0.4 % — HIGH (ref 0.1–0.3)
INR BLD: 1.09 RATIO — SIGNIFICANT CHANGE UP (ref 0.65–1.3)
LYMPHOCYTES # BLD AUTO: 1.82 K/UL — SIGNIFICANT CHANGE UP (ref 1.2–3.4)
LYMPHOCYTES # BLD AUTO: 32.3 % — SIGNIFICANT CHANGE UP (ref 20.5–51.1)
MCHC RBC-ENTMCNC: 22.8 PG — LOW (ref 27–31)
MCHC RBC-ENTMCNC: 30.6 G/DL — LOW (ref 32–37)
MCV RBC AUTO: 74.6 FL — LOW (ref 80–94)
MONOCYTES # BLD AUTO: 0.56 K/UL — SIGNIFICANT CHANGE UP (ref 0.1–0.6)
MONOCYTES NFR BLD AUTO: 9.9 % — HIGH (ref 1.7–9.3)
NEUTROPHILS # BLD AUTO: 3.02 K/UL — SIGNIFICANT CHANGE UP (ref 1.4–6.5)
NEUTROPHILS NFR BLD AUTO: 53.6 % — SIGNIFICANT CHANGE UP (ref 42.2–75.2)
NRBC # BLD: 0 /100 WBCS — SIGNIFICANT CHANGE UP (ref 0–0)
PLATELET # BLD AUTO: 323 K/UL — SIGNIFICANT CHANGE UP (ref 130–400)
POTASSIUM SERPL-MCNC: 4.2 MMOL/L — SIGNIFICANT CHANGE UP (ref 3.5–5)
POTASSIUM SERPL-SCNC: 4.2 MMOL/L — SIGNIFICANT CHANGE UP (ref 3.5–5)
PROT SERPL-MCNC: 8.8 G/DL — HIGH (ref 6–8)
PROTHROM AB SERPL-ACNC: 12.5 SEC — SIGNIFICANT CHANGE UP (ref 9.95–12.87)
RBC # BLD: 4.52 M/UL — LOW (ref 4.7–6.1)
RBC # FLD: 19.9 % — HIGH (ref 11.5–14.5)
SODIUM SERPL-SCNC: 132 MMOL/L — LOW (ref 135–146)
WBC # BLD: 5.63 K/UL — SIGNIFICANT CHANGE UP (ref 4.8–10.8)
WBC # FLD AUTO: 5.63 K/UL — SIGNIFICANT CHANGE UP (ref 4.8–10.8)

## 2022-08-09 PROCEDURE — 12011 RPR F/E/E/N/L/M 2.5 CM/<: CPT

## 2022-08-09 PROCEDURE — 71045 X-RAY EXAM CHEST 1 VIEW: CPT | Mod: 26

## 2022-08-09 PROCEDURE — 93010 ELECTROCARDIOGRAM REPORT: CPT

## 2022-08-09 PROCEDURE — 86077 PHYS BLOOD BANK SERV XMATCH: CPT

## 2022-08-09 PROCEDURE — 99284 EMERGENCY DEPT VISIT MOD MDM: CPT | Mod: 25

## 2022-08-09 RX ORDER — DESMOPRESSIN ACETATE 0.1 MG/1
21 TABLET ORAL ONCE
Refills: 0 | Status: DISCONTINUED | OUTPATIENT
Start: 2022-08-09 | End: 2022-08-09

## 2022-08-09 RX ORDER — TRANEXAMIC ACID 100 MG/ML
1000 INJECTION, SOLUTION INTRAVENOUS ONCE
Refills: 0 | Status: COMPLETED | OUTPATIENT
Start: 2022-08-09 | End: 2022-08-09

## 2022-08-09 RX ORDER — TRANEXAMIC ACID 100 MG/ML
5 INJECTION, SOLUTION INTRAVENOUS ONCE
Refills: 0 | Status: COMPLETED | OUTPATIENT
Start: 2022-08-09 | End: 2022-08-09

## 2022-08-09 RX ORDER — DESMOPRESSIN ACETATE 0.1 MG/1
21 TABLET ORAL ONCE
Refills: 0 | Status: COMPLETED | OUTPATIENT
Start: 2022-08-09 | End: 2022-08-09

## 2022-08-09 RX ADMIN — TRANEXAMIC ACID 5 MILLILITER(S): 100 INJECTION, SOLUTION INTRAVENOUS at 14:48

## 2022-08-09 RX ADMIN — TRANEXAMIC ACID 220 MILLIGRAM(S): 100 INJECTION, SOLUTION INTRAVENOUS at 12:04

## 2022-08-09 RX ADMIN — DESMOPRESSIN ACETATE 221 MICROGRAM(S): 0.1 TABLET ORAL at 14:59

## 2022-08-09 NOTE — ED PROVIDER NOTE - OBJECTIVE STATEMENT
58 y/o male with hx of metastatic lung ca on keytruda infusions and prednisone, recently started on Lenvima presents to the ED with spontaneous bleeding from right upper gums with clots since this am. patient denies any dizziness, weakness. no gross hematuria, hematemesis, bloody stools. no fevers. no rashes. patient denies any dental pain or injury this am preceding bleeding. no sob, cp 58 y/o male with hx of metastatic lung ca on keytruda infusions and prednisone, presents to the ED with spontaneous bleeding from right upper gums with clots since this am. patient denies any dizziness, weakness. no gross hematuria, hematemesis, bloody stools. no fevers. no rashes. patient denies any dental pain or injury this am preceding bleeding. no sob, cp

## 2022-08-09 NOTE — ED PROVIDER NOTE - ATTENDING APP SHARED VISIT CONTRIBUTION OF CARE
I personally evaluated patient. I agree with the findings and plan with all documentation on chart except as documented  in my note.    57-year-old male past medical history of metastatic lung cancer on Keytruda infusions and steroids presents to the emergency department for bleeding from gums.  Patient had persistent bleed from upper mouth and a Filling mouth with blood and clots.  Patient denies any petechiae, skin changes, bleeding from any other areas.  Patient denies any melena or other source of bleeding.  Patient has no signs or symptoms of symptomatic anemia.  Patient was seen and evaluated.  Vital signs reviewed.  Patient is nontoxic-appearing.  Patient bleeding from mouth and direct pressure was held.  IV placed and labs sent.  Patient given IV TXA and later IV DDAVP.  Bleeding was slowed down and controlled with direct pressure and TXA on gauze.  Small area of bleeding was cauterized with silver nitrate above tooth #26 on the inner upper gum.  Patient was observed for a long time in the emergency department and had no further bleeding.  Patient tolerating water and swallowing without issues.  Patient has appropriate hemoglobin and platelet count.  Patient has close follow-up with his doctor.  All results given to patient and strict return precautions discussed. Patient to follow-up with his doctor as bleeding may be secondary to one of his infusions.  His doctor to make decision on whether to continue this medication.  All questions and concerns from patient addressed.    Patient is a good candidate to attempt outpatient management. Supportive care and home care discussed in detail. Patient aware they may have to return for re-evaluation and possible admission if outpatient treatment fails. Strict return precautions discussed.    Full DC instructions discussed and patient knows when to seek immediate medical attention.  Patient has proper follow up.  All results discussed and patient aware they may require further work up.  Proper follow up ensured. Limitations of ED work up discussed.  Medications administered and prescribed/OTC home meds discussed.  All questions and concerns from patient or family addressed. Understanding of instructions verbalized.

## 2022-08-09 NOTE — ED ADULT TRIAGE NOTE - CHIEF COMPLAINT QUOTE
pt states he is taking medicine for his liver and the side effect is bleeding. Pt does not know the name of the IV infusion . Pt has mouth bleeding

## 2022-08-09 NOTE — ED PROVIDER NOTE - CARE PROVIDER_API CALL
Ben Zavaleta)  Otolaryngology  [address] [place] [address], [address] [place] [address]  Phone: [phone]  Fax: [phone]  Follow Up Time:

## 2022-08-09 NOTE — ED ADULT NURSE NOTE - NSIMPLEMENTINTERV_GEN_ALL_ED
Implemented All Universal Safety Interventions:  [place] to call system. Call bell, personal items and telephone within reach. Instruct patient to call for assistance. Room bathroom lighting operational. Non-slip footwear when patient is off stretcher. Physically safe environment: no spills, clutter or unnecessary equipment. Stretcher in lowest position, wheels locked, appropriate side rails in place.

## 2022-08-09 NOTE — ED PROVIDER NOTE - NSFOLLOWUPINSTRUCTIONS_ED_ALL_ED_FT
Bleeding After Dental Procedures    A certain amount of bleeding is common after some dental procedures. You have a higher risk of bleeding after a dental procedure if you are taking medicine to prevent your blood from clotting (blood thinner medicine).    Before having any dental procedures, including routine cleanings, tell your dental care provider about:  All of your health problems.  All medicines and supplements that you are taking.  Your dental care provider should talk with your health care provider before stopping any blood thinner medicine that you are taking. Stopping your medicine could lead to serious health problems such as a heart attack or stroke.    What dental procedures raise my risk of bleeding?  Some dental procedures that may cause bleeding include:  Teeth cleaning (prophylaxis).  Deep teeth cleaning (scaling and root planing).  Gum (periodontal) surgery.  Tooth removal (extraction).  Dental implant placement.  Removal of tissue for testing (biopsy).  Dental procedures do not always cause bleeding in every person.  Blood mixes with saliva, so there may seem to be more bleeding than there really is.    How is this treated?  If your bleeding is severe, your dental care provider may:  Place a surgical sponge or bandage (dressing) over the area that is bleeding.  Clean the area and close your wound with stitches (sutures).  Use an electrical device (cautery) or a laser to stop bleeding.  Prescribe or apply a medicine to help control bleeding.    Follow these instructions at home:     For 24 hours after your procedure or longer:  Do not spit or rinse your mouth.  Do not drink hot beverages.  Do not drink with a straw.  Do not use any products that contain nicotine or tobacco, such as cigarettes and e-cigarettes.   If you need help quitting, ask your healthcare provider.  Do not eat hard foods or foods with sharp edges during the first 2–3 days after your procedure.  These foods include nuts, pretzels, and chips.  Bite down firmly on moist gauze over the area for at least 30 minutes after the procedure.   If bleeding does not stop after that time, continue biting firmly on moist gauze or a tea bag for 30 more minutes, or as long as your health care provider recommends.  Take over the counter and prescription medicines only as told by your health care provider.  Keep all follow-up visits as told by your health care provider. This is important.    Contact a health care provider if:  Your bleeding gets worse, is severe, or does not stop.  You have a large blood clot where the bleeding stopped.  You have a fever.    Get help right away if:  You have chest pain.  You have trouble breathing.  You feel light-headed, dizzy, or confused.  You have trouble speaking.  These symptoms may be an emergency.   Do not wait to see if the symptoms will go away.  Get medical help right away. Call your local emergency services (911 in the [place] Do not drive yourself to the hospital.     Summary    A certain amount of bleeding is common after some dental procedures. You may be able to stop the bleeding on your own. If you cannot stop the bleeding, contact your dental care provider or your health care provider. You have a higher risk of bleeding after a dental procedure if you are taking medicine to prevent your blood from clotting (blood thinner medicine). Talk with your health care provider before stopping any blood thinner medicine that you are taking. Stopping your medicine could lead to serious health problems such as a heart attack or stroke.    This information is not intended to replace advice given to you by your health care provider. Make sure you discuss any questions you have with your health care provider.

## 2022-08-09 NOTE — ED PROCEDURE NOTE - GENERAL PROCEDURE DETAILS
Small area of bleeding stopped with chemical cautery with silver nitrate stick over upper inner gums of tooth # 25.

## 2022-08-09 NOTE — ED PROVIDER NOTE - NSFOLLOWUPCLINICS_GEN_ALL_ED_FT
[facility]  Dental  [address] [place] [id number]  Phone: [phone]  Fax:

## 2022-08-09 NOTE — ED PROVIDER NOTE - CLINICAL SUMMARY MEDICAL DECISION MAKING FREE TEXT BOX
57-year-old male past medical history of metastatic lung cancer on Keytruda infusions and steroids presents to the emergency department for bleeding from gums.  Patient had persistent bleed from upper mouth and a Filling mouth with blood and clots.  Patient denies any petechiae, skin changes, bleeding from any other areas.  Patient denies any melena or other source of bleeding.  Patient has no signs or symptoms of symptomatic anemia.  Patient was seen and evaluated.  Vital signs reviewed.  Patient is nontoxic-appearing.  Patient bleeding from mouth and direct pressure was held.  IV placed and labs sent.  Patient given IV TXA and later IV DDAVP.  Bleeding was slowed down and controlled with direct pressure and TXA on gauze.  Small area of bleeding was cauterized with silver nitrate above tooth #26 on the inner upper gum.  Patient was observed for a long time in the emergency department and had no further bleeding.  Patient tolerating water and swallowing without issues.  Patient has appropriate hemoglobin and platelet count.  Patient has close follow-up with his doctor.  All results given to patient and strict return precautions discussed. Patient to follow-up with his doctor as bleeding may be secondary to one of his infusions.  His doctor to make decision on whether to continue this medication.  All questions and concerns from patient addressed.    Patient is a good candidate to attempt outpatient management. Supportive care and home care discussed in detail. Patient aware they may have to return for re-evaluation and possible admission if outpatient treatment fails. Strict return precautions discussed.    Full DC instructions discussed and patient knows when to seek immediate medical attention.  Patient has proper follow up.  All results discussed and patient aware they may require further work up.  Proper follow up ensured. Limitations of ED work up discussed.  Medications administered and prescribed/OTC home meds discussed.  All questions and concerns from patient or family addressed. Understanding of instructions verbalized.

## 2022-08-11 ENCOUNTER — OUTPATIENT (OUTPATIENT)
Dept: OUTPATIENT SERVICES | Facility: HOSPITAL | Age: 57
LOS: 1 days | Discharge: HOME | End: 2022-08-11

## 2022-08-11 PROBLEM — C22.0 LIVER CELL CARCINOMA: Chronic | Status: ACTIVE | Noted: 2022-08-09

## 2022-11-30 NOTE — ED ADULT NURSE NOTE - NS ED NOTE ABUSE RESPONSE YN
----- Message from Caity Pena MD sent at 11/27/2022  2:29 PM CST -----  Inform patient normal    
Left message to notify celiac test normal. Verify patient has been notified with vitamin D and CMP previously. Per Dr ARMANDO Pena MD.  
Yes

## 2023-03-02 NOTE — ED PROVIDER NOTE - PMH
1 No pertinent past medical history <<----- Click to add NO pertinent Past Medical History Hyperlipidemia    Hypertension

## 2023-10-03 ENCOUNTER — EMERGENCY (EMERGENCY)
Facility: HOSPITAL | Age: 58
LOS: 0 days | Discharge: HOME | End: 2023-10-03
Attending: EMERGENCY MEDICINE
Payer: COMMERCIAL

## 2023-10-03 VITALS
OXYGEN SATURATION: 95 % | TEMPERATURE: 99 F | HEART RATE: 96 BPM | SYSTOLIC BLOOD PRESSURE: 159 MMHG | DIASTOLIC BLOOD PRESSURE: 90 MMHG

## 2023-10-03 VITALS
TEMPERATURE: 99 F | DIASTOLIC BLOOD PRESSURE: 103 MMHG | WEIGHT: 136.03 LBS | HEART RATE: 107 BPM | RESPIRATION RATE: 20 BRPM | HEIGHT: 62 IN | SYSTOLIC BLOOD PRESSURE: 158 MMHG | OXYGEN SATURATION: 100 %

## 2023-10-03 DIAGNOSIS — I10 ESSENTIAL (PRIMARY) HYPERTENSION: ICD-10-CM

## 2023-10-03 DIAGNOSIS — R05.8 OTHER SPECIFIED COUGH: ICD-10-CM

## 2023-10-03 DIAGNOSIS — R05.1 ACUTE COUGH: ICD-10-CM

## 2023-10-03 DIAGNOSIS — E78.5 HYPERLIPIDEMIA, UNSPECIFIED: ICD-10-CM

## 2023-10-03 DIAGNOSIS — C22.0 LIVER CELL CARCINOMA: ICD-10-CM

## 2023-10-03 DIAGNOSIS — R91.1 SOLITARY PULMONARY NODULE: ICD-10-CM

## 2023-10-03 LAB
ALBUMIN SERPL ELPH-MCNC: 3.1 G/DL — LOW (ref 3.5–5.2)
ALP SERPL-CCNC: 228 U/L — HIGH (ref 30–115)
ALT FLD-CCNC: 39 U/L — SIGNIFICANT CHANGE UP (ref 0–41)
ANION GAP SERPL CALC-SCNC: 8 MMOL/L — SIGNIFICANT CHANGE UP (ref 7–14)
ANISOCYTOSIS BLD QL: SIGNIFICANT CHANGE UP
AST SERPL-CCNC: 101 U/L — HIGH (ref 0–41)
BASOPHILS # BLD AUTO: 0.09 K/UL — SIGNIFICANT CHANGE UP (ref 0–0.2)
BASOPHILS NFR BLD AUTO: 1.4 % — HIGH (ref 0–1)
BILIRUB SERPL-MCNC: 0.6 MG/DL — SIGNIFICANT CHANGE UP (ref 0.2–1.2)
BUN SERPL-MCNC: 13 MG/DL — SIGNIFICANT CHANGE UP (ref 10–20)
CALCIUM SERPL-MCNC: 9 MG/DL — SIGNIFICANT CHANGE UP (ref 8.4–10.5)
CHLORIDE SERPL-SCNC: 96 MMOL/L — LOW (ref 98–110)
CO2 SERPL-SCNC: 24 MMOL/L — SIGNIFICANT CHANGE UP (ref 17–32)
CREAT SERPL-MCNC: 0.7 MG/DL — SIGNIFICANT CHANGE UP (ref 0.7–1.5)
EGFR: 107 ML/MIN/1.73M2 — SIGNIFICANT CHANGE UP
ELLIPTOCYTES BLD QL SMEAR: SLIGHT — SIGNIFICANT CHANGE UP
EOSINOPHIL # BLD AUTO: 0.13 K/UL — SIGNIFICANT CHANGE UP (ref 0–0.7)
EOSINOPHIL NFR BLD AUTO: 2 % — SIGNIFICANT CHANGE UP (ref 0–8)
GLUCOSE SERPL-MCNC: 90 MG/DL — SIGNIFICANT CHANGE UP (ref 70–99)
HCT VFR BLD CALC: 28.4 % — LOW (ref 42–52)
HGB BLD-MCNC: 8 G/DL — LOW (ref 14–18)
HYPOCHROMIA BLD QL: SLIGHT — SIGNIFICANT CHANGE UP
IMM GRANULOCYTES NFR BLD AUTO: 0.5 % — HIGH (ref 0.1–0.3)
LYMPHOCYTES # BLD AUTO: 1.67 K/UL — SIGNIFICANT CHANGE UP (ref 1.2–3.4)
LYMPHOCYTES # BLD AUTO: 25.4 % — SIGNIFICANT CHANGE UP (ref 20.5–51.1)
MAGNESIUM SERPL-MCNC: 1.9 MG/DL — SIGNIFICANT CHANGE UP (ref 1.8–2.4)
MANUAL SMEAR VERIFICATION: SIGNIFICANT CHANGE UP
MCHC RBC-ENTMCNC: 19.4 PG — LOW (ref 27–31)
MCHC RBC-ENTMCNC: 28.2 G/DL — LOW (ref 32–37)
MCV RBC AUTO: 68.8 FL — LOW (ref 80–94)
MICROCYTES BLD QL: SLIGHT — SIGNIFICANT CHANGE UP
MONOCYTES # BLD AUTO: 0.92 K/UL — HIGH (ref 0.1–0.6)
MONOCYTES NFR BLD AUTO: 14 % — HIGH (ref 1.7–9.3)
NEUTROPHILS # BLD AUTO: 3.74 K/UL — SIGNIFICANT CHANGE UP (ref 1.4–6.5)
NEUTROPHILS NFR BLD AUTO: 56.7 % — SIGNIFICANT CHANGE UP (ref 42.2–75.2)
NRBC # BLD: 0 /100 WBCS — SIGNIFICANT CHANGE UP (ref 0–0)
NT-PROBNP SERPL-SCNC: 69 PG/ML — SIGNIFICANT CHANGE UP (ref 0–300)
PLAT MORPH BLD: NORMAL — SIGNIFICANT CHANGE UP
PLATELET # BLD AUTO: 514 K/UL — HIGH (ref 130–400)
PLATELET COUNT - ESTIMATE: NORMAL — SIGNIFICANT CHANGE UP
PMV BLD: 8.3 FL — SIGNIFICANT CHANGE UP (ref 7.4–10.4)
POIKILOCYTOSIS BLD QL AUTO: SLIGHT — SIGNIFICANT CHANGE UP
POLYCHROMASIA BLD QL SMEAR: SLIGHT — SIGNIFICANT CHANGE UP
POTASSIUM SERPL-MCNC: 4.8 MMOL/L — SIGNIFICANT CHANGE UP (ref 3.5–5)
POTASSIUM SERPL-SCNC: 4.8 MMOL/L — SIGNIFICANT CHANGE UP (ref 3.5–5)
PROT SERPL-MCNC: 9.5 G/DL — HIGH (ref 6–8)
RBC # BLD: 4.13 M/UL — LOW (ref 4.7–6.1)
RBC # FLD: 21.9 % — HIGH (ref 11.5–14.5)
RBC BLD AUTO: ABNORMAL
SODIUM SERPL-SCNC: 128 MMOL/L — LOW (ref 135–146)
TROPONIN T SERPL-MCNC: <0.01 NG/ML — SIGNIFICANT CHANGE UP
WBC # BLD: 6.58 K/UL — SIGNIFICANT CHANGE UP (ref 4.8–10.8)
WBC # FLD AUTO: 6.58 K/UL — SIGNIFICANT CHANGE UP (ref 4.8–10.8)

## 2023-10-03 PROCEDURE — 84484 ASSAY OF TROPONIN QUANT: CPT

## 2023-10-03 PROCEDURE — 94640 AIRWAY INHALATION TREATMENT: CPT

## 2023-10-03 PROCEDURE — 71045 X-RAY EXAM CHEST 1 VIEW: CPT | Mod: 26

## 2023-10-03 PROCEDURE — 93010 ELECTROCARDIOGRAM REPORT: CPT

## 2023-10-03 PROCEDURE — 93005 ELECTROCARDIOGRAM TRACING: CPT

## 2023-10-03 PROCEDURE — 99285 EMERGENCY DEPT VISIT HI MDM: CPT

## 2023-10-03 PROCEDURE — 71275 CT ANGIOGRAPHY CHEST: CPT | Mod: 26,MA

## 2023-10-03 PROCEDURE — 85025 COMPLETE CBC W/AUTO DIFF WBC: CPT

## 2023-10-03 PROCEDURE — 71045 X-RAY EXAM CHEST 1 VIEW: CPT

## 2023-10-03 PROCEDURE — 83880 ASSAY OF NATRIURETIC PEPTIDE: CPT

## 2023-10-03 PROCEDURE — 83735 ASSAY OF MAGNESIUM: CPT

## 2023-10-03 PROCEDURE — 99285 EMERGENCY DEPT VISIT HI MDM: CPT | Mod: 25

## 2023-10-03 PROCEDURE — 80053 COMPREHEN METABOLIC PANEL: CPT

## 2023-10-03 PROCEDURE — 36415 COLL VENOUS BLD VENIPUNCTURE: CPT

## 2023-10-03 PROCEDURE — 71275 CT ANGIOGRAPHY CHEST: CPT | Mod: MA

## 2023-10-03 RX ORDER — DEXAMETHASONE 0.5 MG/5ML
10 ELIXIR ORAL ONCE
Refills: 0 | Status: COMPLETED | OUTPATIENT
Start: 2023-10-03 | End: 2023-10-03

## 2023-10-03 RX ORDER — SODIUM CHLORIDE 9 MG/ML
4 INJECTION INTRAMUSCULAR; INTRAVENOUS; SUBCUTANEOUS ONCE
Refills: 0 | Status: COMPLETED | OUTPATIENT
Start: 2023-10-03 | End: 2023-10-03

## 2023-10-03 RX ADMIN — Medication 10 MILLIGRAM(S): at 05:22

## 2023-10-03 RX ADMIN — SODIUM CHLORIDE 4 MILLILITER(S): 9 INJECTION INTRAMUSCULAR; INTRAVENOUS; SUBCUTANEOUS at 02:39

## 2023-10-03 NOTE — ED PROVIDER NOTE - CLINICAL SUMMARY MEDICAL DECISION MAKING FREE TEXT BOX
50-year-old male history of HCC on oral chemo presents with dry cough x1 week worsening patient denies shortness of breath, chest pain.  resp cta ,  CTA ro given  hx -No central or segmental pulmonary emboli.  	  	LEFT upper lobe solid 0.6 cm nodule.  	  	Other chronic/incidental findings as above.  Discussed importance and results with patient aware of follow-up imaging for pulmonary nodule.  Patient to be discharged from ED in well appearing condition. Any available test results were discussed with and printed  for patient.  Verbal instructions given, including instructions to return to ED immediately for any new, worsening, or concerning symptoms. Limitations of ED work up discussed.  Patient reports understanding of above with capacity and insight. Written discharge instructions additionally given, including follow-up plan.

## 2023-10-03 NOTE — ED ADULT NURSE NOTE - PRIMARY CARE PROVIDER
Nyquil at night  Robitussin multisystem severe cough and cold during the day  Augmentim antibiotics    pcp

## 2023-10-03 NOTE — ED PROVIDER NOTE - CARE PROVIDER_API CALL
Phillip Carlson  Pulmonary Disease  [address] [place] [id number]  Phone: [phone]  Fax: [phone]  Follow Up Time:

## 2023-10-03 NOTE — ED PROVIDER NOTE - PATIENT PORTAL LINK FT
You can access the FollowMyHealth Patient Portal offered by [facility] by registering at the following website: [email or web address]. By joining [employer]’s FollowMyHealth portal, you will also be able to view your health information using other applications (apps) compatible with our system.

## 2023-10-03 NOTE — ED PROVIDER NOTE - OBJECTIVE STATEMENT
58 year old male past medical history of liver ca on oral chemo comes to emergency room for persistent cough for the last week. patient states that cough is worse tonight and feels shortness of breath. no abd pain. no nausea, vomiting, diarrhea. no fever/chills.

## 2023-10-03 NOTE — ED ADULT NURSE NOTE - NSFALLUNIVINTERV_ED_ALL_ED
Bed/Stretcher in lowest position, wheels locked, appropriate side rails in place/Call bell, personal items and telephone in reach/Instruct patient to call for assistance before getting out of bed/chair/stretcher/Non-slip footwear applied when patient is off stretcher/[place] to call system/Physically safe environment - no spills, clutter or unnecessary equipment/Purposeful proactive rounding/Room/bathroom lighting operational, light cord in reach

## 2023-10-12 ENCOUNTER — APPOINTMENT (OUTPATIENT)
Dept: PULMONOLOGY | Facility: CLINIC | Age: 58
End: 2023-10-12
Payer: COMMERCIAL

## 2023-10-12 VITALS
SYSTOLIC BLOOD PRESSURE: 120 MMHG | DIASTOLIC BLOOD PRESSURE: 80 MMHG | BODY MASS INDEX: 24.84 KG/M2 | OXYGEN SATURATION: 99 % | HEIGHT: 62 IN | HEART RATE: 102 BPM | WEIGHT: 135 LBS

## 2023-10-12 DIAGNOSIS — R91.1 SOLITARY PULMONARY NODULE: ICD-10-CM

## 2023-10-12 DIAGNOSIS — Z85.05 PERSONAL HISTORY OF MALIGNANT NEOPLASM OF LIVER: ICD-10-CM

## 2023-10-12 DIAGNOSIS — R05.3 CHRONIC COUGH: ICD-10-CM

## 2023-10-12 DIAGNOSIS — Z78.9 OTHER SPECIFIED HEALTH STATUS: ICD-10-CM

## 2023-10-12 DIAGNOSIS — J45.909 UNSPECIFIED ASTHMA, UNCOMPLICATED: ICD-10-CM

## 2023-10-12 PROCEDURE — 99214 OFFICE O/P EST MOD 30 MIN: CPT

## 2023-10-12 RX ORDER — BUDESONIDE AND FORMOTEROL FUMARATE DIHYDRATE 160; 4.5 UG/1; UG/1
160-4.5 AEROSOL RESPIRATORY (INHALATION) TWICE DAILY
Qty: 1 | Refills: 3 | Status: ACTIVE | COMMUNITY
Start: 2023-10-12 | End: 1900-01-01

## 2023-10-12 RX ORDER — PREDNISONE 20 MG/1
20 TABLET ORAL
Qty: 15 | Refills: 0 | Status: ACTIVE | COMMUNITY
Start: 2023-10-12 | End: 1900-01-01

## 2024-01-24 ENCOUNTER — APPOINTMENT (OUTPATIENT)
Dept: PULMONOLOGY | Facility: CLINIC | Age: 59
End: 2024-01-24